# Patient Record
Sex: MALE | Race: BLACK OR AFRICAN AMERICAN | Employment: UNEMPLOYED | URBAN - METROPOLITAN AREA
[De-identification: names, ages, dates, MRNs, and addresses within clinical notes are randomized per-mention and may not be internally consistent; named-entity substitution may affect disease eponyms.]

---

## 2019-08-16 ENCOUNTER — HOSPITAL ENCOUNTER (EMERGENCY)
Age: 24
Discharge: HOME | End: 2019-08-16
Attending: EMERGENCY MEDICINE
Payer: COMMERCIAL

## 2019-08-16 ENCOUNTER — APPOINTMENT (OUTPATIENT)
Dept: GENERAL RADIOLOGY | Age: 24
End: 2019-08-16
Payer: COMMERCIAL

## 2019-08-16 VITALS
HEART RATE: 99 BPM | WEIGHT: 240 LBS | TEMPERATURE: 98 F | RESPIRATION RATE: 18 BRPM | DIASTOLIC BLOOD PRESSURE: 89 MMHG | HEIGHT: 72 IN | OXYGEN SATURATION: 98 % | BODY MASS INDEX: 32.51 KG/M2 | SYSTOLIC BLOOD PRESSURE: 150 MMHG

## 2019-08-16 DIAGNOSIS — S62.346A CLOSED NONDISPLACED FRACTURE OF BASE OF FIFTH METACARPAL BONE OF RIGHT HAND, INITIAL ENCOUNTER: Primary | ICD-10-CM

## 2019-08-16 PROCEDURE — 99283 EMERGENCY DEPT VISIT LOW MDM: CPT

## 2019-08-16 PROCEDURE — 73130 X-RAY EXAM OF HAND: CPT

## 2019-08-16 PROCEDURE — 73110 X-RAY EXAM OF WRIST: CPT

## 2019-08-16 RX ORDER — NAPROXEN 500 MG/1
500 TABLET ORAL 2 TIMES DAILY
Qty: 20 TABLET | Refills: 0 | Status: ON HOLD | OUTPATIENT
Start: 2019-08-16 | End: 2019-12-30

## 2019-08-16 ASSESSMENT — PAIN DESCRIPTION - LOCATION: LOCATION: HAND

## 2019-08-16 ASSESSMENT — PAIN DESCRIPTION - DESCRIPTORS: DESCRIPTORS: ACHING;SHARP;STABBING

## 2019-08-16 ASSESSMENT — PAIN DESCRIPTION - PAIN TYPE: TYPE: ACUTE PAIN

## 2019-08-16 ASSESSMENT — PAIN DESCRIPTION - ORIENTATION: ORIENTATION: RIGHT

## 2019-08-16 ASSESSMENT — PAIN SCALES - GENERAL: PAINLEVEL_OUTOF10: 10

## 2019-08-16 ASSESSMENT — ENCOUNTER SYMPTOMS: COLOR CHANGE: 0

## 2019-08-20 DIAGNOSIS — M79.641 RIGHT HAND PAIN: Primary | ICD-10-CM

## 2019-08-21 ENCOUNTER — OFFICE VISIT (OUTPATIENT)
Dept: ORTHOPEDIC SURGERY | Age: 24
End: 2019-08-21
Payer: COMMERCIAL

## 2019-08-21 VITALS — BODY MASS INDEX: 32.25 KG/M2 | WEIGHT: 238.1 LBS | HEIGHT: 72 IN

## 2019-08-21 DIAGNOSIS — S62.346A CLOSED NONDISPLACED FRACTURE OF BASE OF FIFTH METACARPAL BONE OF RIGHT HAND, INITIAL ENCOUNTER: Primary | ICD-10-CM

## 2019-08-21 PROCEDURE — 1036F TOBACCO NON-USER: CPT | Performed by: ORTHOPAEDIC SURGERY

## 2019-08-21 PROCEDURE — G8417 CALC BMI ABV UP PARAM F/U: HCPCS | Performed by: ORTHOPAEDIC SURGERY

## 2019-08-21 PROCEDURE — 99213 OFFICE O/P EST LOW 20 MIN: CPT | Performed by: ORTHOPAEDIC SURGERY

## 2019-08-21 PROCEDURE — G8427 DOCREV CUR MEDS BY ELIG CLIN: HCPCS | Performed by: ORTHOPAEDIC SURGERY

## 2019-09-04 ENCOUNTER — OFFICE VISIT (OUTPATIENT)
Dept: ORTHOPEDIC SURGERY | Age: 24
End: 2019-09-04
Payer: COMMERCIAL

## 2019-09-04 VITALS — WEIGHT: 238.1 LBS | BODY MASS INDEX: 32.25 KG/M2 | HEIGHT: 72 IN

## 2019-09-04 DIAGNOSIS — S62.346A CLOSED NONDISPLACED FRACTURE OF BASE OF FIFTH METACARPAL BONE OF RIGHT HAND, INITIAL ENCOUNTER: Primary | ICD-10-CM

## 2019-09-04 PROCEDURE — 1036F TOBACCO NON-USER: CPT | Performed by: STUDENT IN AN ORGANIZED HEALTH CARE EDUCATION/TRAINING PROGRAM

## 2019-09-04 PROCEDURE — G8417 CALC BMI ABV UP PARAM F/U: HCPCS | Performed by: STUDENT IN AN ORGANIZED HEALTH CARE EDUCATION/TRAINING PROGRAM

## 2019-09-04 PROCEDURE — G8427 DOCREV CUR MEDS BY ELIG CLIN: HCPCS | Performed by: STUDENT IN AN ORGANIZED HEALTH CARE EDUCATION/TRAINING PROGRAM

## 2019-09-04 PROCEDURE — 99212 OFFICE O/P EST SF 10 MIN: CPT | Performed by: STUDENT IN AN ORGANIZED HEALTH CARE EDUCATION/TRAINING PROGRAM

## 2019-09-05 NOTE — PROGRESS NOTES
name: Not on file    Number of children: Not on file    Years of education: Not on file    Highest education level: Not on file   Occupational History    Not on file   Social Needs    Financial resource strain: Not on file    Food insecurity:     Worry: Not on file     Inability: Not on file    Transportation needs:     Medical: Not on file     Non-medical: Not on file   Tobacco Use    Smoking status: Never Smoker    Smokeless tobacco: Never Used   Substance and Sexual Activity    Alcohol use: No    Drug use: No    Sexual activity: Not Currently   Lifestyle    Physical activity:     Days per week: Not on file     Minutes per session: Not on file    Stress: Not on file   Relationships    Social connections:     Talks on phone: Not on file     Gets together: Not on file     Attends Restorationist service: Not on file     Active member of club or organization: Not on file     Attends meetings of clubs or organizations: Not on file     Relationship status: Not on file    Intimate partner violence:     Fear of current or ex partner: Not on file     Emotionally abused: Not on file     Physically abused: Not on file     Forced sexual activity: Not on file   Other Topics Concern    Not on file   Social History Narrative    Not on file       Family History:  Family History   Problem Relation Age of Onset    High Blood Pressure Mother         pre-htn    High Blood Pressure Father         pre-htn       REVIEW OF SYSTEMS:  Constitutional: Negative for fever and chills.      Musculoskeletal: Positive for tenderness right hand     PHYSICAL EXAM:  Ht 6' 0.01\" (1.829 m)   Wt 238 lb 1.6 oz (108 kg)   BMI 32.28 kg/m²   Gen: AAOx3, NAD, appears the stated age  CV: no dependent edema, distal pulses 2+  Chest: unlabored respirations, equal chest rise bilaterally, no audible wheezes    Ortho Exam  Extremity: Right hand  Appropriate tenderness to palpation appreciated at the base of the fifth metacarpal.  Hand cascade

## 2019-12-03 ENCOUNTER — TELEPHONE (OUTPATIENT)
Dept: FAMILY MEDICINE CLINIC | Age: 24
End: 2019-12-03

## 2019-12-30 ENCOUNTER — APPOINTMENT (OUTPATIENT)
Dept: CT IMAGING | Age: 24
DRG: 753 | End: 2019-12-30
Payer: COMMERCIAL

## 2019-12-30 ENCOUNTER — HOSPITAL ENCOUNTER (INPATIENT)
Age: 24
LOS: 8 days | Discharge: HOME | DRG: 753 | End: 2020-01-07
Attending: EMERGENCY MEDICINE | Admitting: PSYCHIATRY & NEUROLOGY
Payer: COMMERCIAL

## 2019-12-30 PROBLEM — F31.9 BIPOLAR 1 DISORDER (HCC): Status: ACTIVE | Noted: 2019-12-30

## 2019-12-30 LAB
ABSOLUTE EOS #: 0 K/UL (ref 0–0.4)
ABSOLUTE IMMATURE GRANULOCYTE: ABNORMAL K/UL (ref 0–0.3)
ABSOLUTE LYMPH #: 1.5 K/UL (ref 1–4.8)
ABSOLUTE MONO #: 0.7 K/UL (ref 0.1–1.3)
ANION GAP SERPL CALCULATED.3IONS-SCNC: 13 MMOL/L (ref 9–17)
BASOPHILS # BLD: 0 % (ref 0–2)
BASOPHILS ABSOLUTE: 0 K/UL (ref 0–0.2)
BUN BLDV-MCNC: 11 MG/DL (ref 6–20)
BUN/CREAT BLD: ABNORMAL (ref 9–20)
CALCIUM SERPL-MCNC: 9.8 MG/DL (ref 8.6–10.4)
CHLORIDE BLD-SCNC: 101 MMOL/L (ref 98–107)
CO2: 22 MMOL/L (ref 20–31)
CREAT SERPL-MCNC: 0.81 MG/DL (ref 0.7–1.2)
DIFFERENTIAL TYPE: ABNORMAL
EOSINOPHILS RELATIVE PERCENT: 0 % (ref 0–4)
ETHANOL PERCENT: <0.01 %
ETHANOL: <10 MG/DL
GFR AFRICAN AMERICAN: >60 ML/MIN
GFR NON-AFRICAN AMERICAN: >60 ML/MIN
GFR SERPL CREATININE-BSD FRML MDRD: ABNORMAL ML/MIN/{1.73_M2}
GFR SERPL CREATININE-BSD FRML MDRD: ABNORMAL ML/MIN/{1.73_M2}
GLUCOSE BLD-MCNC: 154 MG/DL (ref 70–99)
HCT VFR BLD CALC: 48.9 % (ref 41–53)
HEMOGLOBIN: 16.6 G/DL (ref 13.5–17.5)
IMMATURE GRANULOCYTES: ABNORMAL %
LYMPHOCYTES # BLD: 16 % (ref 24–44)
MAGNESIUM: 2.1 MG/DL (ref 1.6–2.6)
MCH RBC QN AUTO: 30.6 PG (ref 26–34)
MCHC RBC AUTO-ENTMCNC: 33.9 G/DL (ref 31–37)
MCV RBC AUTO: 90.4 FL (ref 80–100)
MONOCYTES # BLD: 7 % (ref 1–7)
NRBC AUTOMATED: ABNORMAL PER 100 WBC
PDW BLD-RTO: 13.6 % (ref 11.5–14.9)
PLATELET # BLD: 256 K/UL (ref 150–450)
PLATELET ESTIMATE: ABNORMAL
PMV BLD AUTO: 7.8 FL (ref 6–12)
POTASSIUM SERPL-SCNC: 3.9 MMOL/L (ref 3.7–5.3)
RBC # BLD: 5.4 M/UL (ref 4.5–5.9)
RBC # BLD: ABNORMAL 10*6/UL
SEG NEUTROPHILS: 77 % (ref 36–66)
SEGMENTED NEUTROPHILS ABSOLUTE COUNT: 7.1 K/UL (ref 1.3–9.1)
SODIUM BLD-SCNC: 136 MMOL/L (ref 135–144)
TSH SERPL DL<=0.05 MIU/L-ACNC: 1.82 MIU/L (ref 0.3–5)
WBC # BLD: 9.3 K/UL (ref 3.5–11)
WBC # BLD: ABNORMAL 10*3/UL

## 2019-12-30 PROCEDURE — 70450 CT HEAD/BRAIN W/O DYE: CPT

## 2019-12-30 PROCEDURE — 1240000000 HC EMOTIONAL WELLNESS R&B

## 2019-12-30 PROCEDURE — 83735 ASSAY OF MAGNESIUM: CPT

## 2019-12-30 PROCEDURE — 85025 COMPLETE CBC W/AUTO DIFF WBC: CPT

## 2019-12-30 PROCEDURE — 84443 ASSAY THYROID STIM HORMONE: CPT

## 2019-12-30 PROCEDURE — 99285 EMERGENCY DEPT VISIT HI MDM: CPT

## 2019-12-30 PROCEDURE — G0480 DRUG TEST DEF 1-7 CLASSES: HCPCS

## 2019-12-30 PROCEDURE — 80048 BASIC METABOLIC PNL TOTAL CA: CPT

## 2019-12-30 PROCEDURE — 6370000000 HC RX 637 (ALT 250 FOR IP): Performed by: NURSE PRACTITIONER

## 2019-12-30 PROCEDURE — 90792 PSYCH DIAG EVAL W/MED SRVCS: CPT | Performed by: NURSE PRACTITIONER

## 2019-12-30 PROCEDURE — 36415 COLL VENOUS BLD VENIPUNCTURE: CPT

## 2019-12-30 RX ORDER — ACETAMINOPHEN 325 MG/1
650 TABLET ORAL EVERY 4 HOURS PRN
Status: DISCONTINUED | OUTPATIENT
Start: 2019-12-30 | End: 2020-01-07 | Stop reason: HOSPADM

## 2019-12-30 RX ORDER — BENZTROPINE MESYLATE 1 MG/ML
2 INJECTION INTRAMUSCULAR; INTRAVENOUS 2 TIMES DAILY PRN
Status: DISCONTINUED | OUTPATIENT
Start: 2019-12-30 | End: 2020-01-07 | Stop reason: HOSPADM

## 2019-12-30 RX ORDER — HALOPERIDOL 10 MG/1
5 TABLET ORAL EVERY 6 HOURS PRN
Status: DISCONTINUED | OUTPATIENT
Start: 2019-12-30 | End: 2019-12-30

## 2019-12-30 RX ORDER — HALOPERIDOL 5 MG/ML
5 INJECTION INTRAMUSCULAR EVERY 6 HOURS PRN
Status: DISCONTINUED | OUTPATIENT
Start: 2019-12-30 | End: 2020-01-07 | Stop reason: HOSPADM

## 2019-12-30 RX ORDER — LORAZEPAM 1 MG/1
1 TABLET ORAL EVERY 6 HOURS PRN
Status: DISCONTINUED | OUTPATIENT
Start: 2019-12-30 | End: 2019-12-30

## 2019-12-30 RX ORDER — MAGNESIUM HYDROXIDE/ALUMINUM HYDROXICE/SIMETHICONE 120; 1200; 1200 MG/30ML; MG/30ML; MG/30ML
30 SUSPENSION ORAL EVERY 6 HOURS PRN
Status: DISCONTINUED | OUTPATIENT
Start: 2019-12-30 | End: 2020-01-07 | Stop reason: HOSPADM

## 2019-12-30 RX ORDER — LORAZEPAM 2 MG/ML
1 INJECTION INTRAMUSCULAR EVERY 6 HOURS PRN
Status: DISCONTINUED | OUTPATIENT
Start: 2019-12-30 | End: 2020-01-07 | Stop reason: HOSPADM

## 2019-12-30 RX ORDER — TRAZODONE HYDROCHLORIDE 50 MG/1
50 TABLET ORAL NIGHTLY PRN
Status: DISCONTINUED | OUTPATIENT
Start: 2019-12-30 | End: 2020-01-07 | Stop reason: HOSPADM

## 2019-12-30 RX ORDER — SERTRALINE HYDROCHLORIDE 100 MG/1
100 TABLET, FILM COATED ORAL DAILY
Status: ON HOLD | COMMUNITY
End: 2020-01-07 | Stop reason: HOSPADM

## 2019-12-30 RX ADMIN — TRAZODONE HYDROCHLORIDE 50 MG: 50 TABLET ORAL at 23:37

## 2019-12-30 ASSESSMENT — LIFESTYLE VARIABLES: HISTORY_ALCOHOL_USE: NO

## 2019-12-30 ASSESSMENT — SLEEP AND FATIGUE QUESTIONNAIRES
RESTFUL SLEEP: NO
DIFFICULTY FALLING ASLEEP: YES
AVERAGE NUMBER OF SLEEP HOURS: 5
DO YOU USE A SLEEP AID: NO
DIFFICULTY STAYING ASLEEP: YES
SLEEP PATTERN: DISTURBED/INTERRUPTED SLEEP;RESTLESSNESS
DIFFICULTY ARISING: NO
DO YOU HAVE DIFFICULTY SLEEPING: YES

## 2019-12-30 ASSESSMENT — ENCOUNTER SYMPTOMS
BACK PAIN: 0
ABDOMINAL PAIN: 0
DIARRHEA: 0
VOMITING: 0
SHORTNESS OF BREATH: 0
COUGH: 0

## 2019-12-30 ASSESSMENT — PAIN DESCRIPTION - LOCATION: LOCATION: HEAD

## 2019-12-30 ASSESSMENT — PAIN - FUNCTIONAL ASSESSMENT: PAIN_FUNCTIONAL_ASSESSMENT: 0-10

## 2019-12-30 ASSESSMENT — PAIN SCALES - GENERAL
PAINLEVEL_OUTOF10: 7
PAINLEVEL_OUTOF10: 0

## 2019-12-30 ASSESSMENT — PAIN DESCRIPTION - DESCRIPTORS: DESCRIPTORS: HEADACHE

## 2019-12-30 ASSESSMENT — PAIN DESCRIPTION - PAIN TYPE: TYPE: ACUTE PAIN

## 2019-12-30 NOTE — VIRTUAL HEALTH
linear, goal directed and coherent  Thought content:  Homocidal ideation denies  Suicidal Ideation:  Patient currently denies thoughts of wanting to die but did request a gun and a knife to inflict some sort of pain on himself earlier today  Delusions:  Patient believes that he is dead and that his family is not his real family; additionally the patient is reported to have amde statements that the family pet was channeling spiritual energy through the family members  Perceptual Disturbance:  Unable to assess  Cognition:  oriented to person, place, and time  Concentration distractible  Memory: intact  Insight & Judgement: limited   Medication side effects: NA     DSM V DIAGNOSIS:  Bipolar mood disorder, current episode manic    General Medical Condition:      There is no problem list on file for this patient. PLAN:    1. Please contact the on-call provider for consideration for admission to the [facility]. When discussing discharge disposition the patient did state that he accepted this writers \"recommendation and guidance\" and would be willing to sign in on a voluntary basis for psychiatric treatment. If patient is unwilling to be admitted on a voluntary basis, a pink slip will be necessary as the patient's family has reasonable concerns about his safety and patient has impaired judgement/insight and safety awareness. Thank you very much for allowing us to participate in the care of this patient. Time spent > 60 min.  Provider's Signature:  Electronically signed by Gerson Velez, MSN, [address] on 12/30/2019 at 5:24 PM.

## 2019-12-30 NOTE — ED NOTES
Patient refusing lab work to be drawn and states that he just wants to be able to go home and get some sleep. He states that he no longer has a headache and doesn't think he needs to have any labs drawn. Dr. Shanti Morgan notified.            David Steele RN  12/30/19 8936

## 2019-12-30 NOTE — ED PROVIDER NOTES
respiratory distress. Breath sounds: Normal breath sounds. Abdominal:      Palpations: Abdomen is soft. Tenderness: There is no tenderness. Skin:     General: Skin is warm and dry. Capillary Refill: Capillary refill takes less than 2 seconds. Neurological:      Mental Status: He is alert. Comments: 5 out of 5 muscle strength in all extremities intact finger-nose no pronator drift negative Romberg able to ambulate without difficulty       DIAGNOSTIC RESULTS   RADIOLOGY:All plain film, CT, MRI, and formal ultrasound images (except ED bedside ultrasound) are read by the radiologist, see reports below, unless otherwisenoted in MDM or here. CT Head WO Contrast   Final Result   No acute intracranial abnormality. LABS: All lab results were reviewed by myself, and all abnormals are listed below. Labs Reviewed   CBC WITH AUTO DIFFERENTIAL - Abnormal; Notable for the following components:       Result Value    Seg Neutrophils 77 (*)     Lymphocytes 16 (*)     All other components within normal limits   BASIC METABOLIC PANEL - Abnormal; Notable for the following components:    Glucose 154 (*)     All other components within normal limits   MAGNESIUM   TSH WITH REFLEX   ETHANOL   URINE DRUG SCREEN       EMERGENCY DEPARTMENTCOURSE:   Differential diagnosis includes intracranial bleed, mass, polysubstance abuse, electrolyte abnormality, exacerbation of chronic mental health disorder. The patient has been to [address] in the past this is not a new onset psychosis. However given his posterior headache will obtain a CT head as well as labs to rule out a medical cause. 4:47 PM  The patient states that his headache feels much better his CT scan is negative for intracranial bleed or mass.   The patient states that he does not want an IV or labs at this time he appears appropriate to me however social work is concerned based on what is the family is saying so will perform a tele-psych. 5:32 PM  Social work, tele-psych would like the patient to be pink slipped because of this will have to complete the medical work-up will obtain lab evaluation. 6:30 PM  There is no elevation in creatinine no electrolyte abnormality no anion gap elevation ethanol is negative glucose is mildly elevated 154 but no anion gap do not believe he is in DKA TSH is normal there is no leukocytosis no anemia no thrombocytopenia. The patient is currently medically cleared he will be admitted for further management of his mental health problem. Vitals:    Vitals:    12/30/19 1503   BP: (!) 158/104   Pulse: 72   Resp: 16   Temp: 98.7 °F (37.1 °C)   TempSrc: Oral   SpO2: 98%   Weight: 220 lb (99.8 kg)   Height: 6' (1.829 m)       FINAL IMPRESSION      1. Nonintractable headache, unspecified chronicity pattern, unspecified headache type    2.  Verbalizes suicidal thoughts         DISPOSITION/PLAN   DISPOSITION Decision To Admit 12/30/2019 06:31:40 PM  Koki Kauffman MD  Attending Emergency Physician    This charting supersedes any ED resident or staff charting and was written using speech recognition software        Koki Kauffman MD  12/30/19 8745

## 2019-12-31 LAB
ALBUMIN SERPL-MCNC: 4 G/DL (ref 3.5–5.2)
ALBUMIN/GLOBULIN RATIO: NORMAL (ref 1–2.5)
ALP BLD-CCNC: 75 U/L (ref 40–129)
ALT SERPL-CCNC: 33 U/L (ref 5–41)
ANION GAP SERPL CALCULATED.3IONS-SCNC: 10 MMOL/L (ref 9–17)
AST SERPL-CCNC: 23 U/L
BILIRUB SERPL-MCNC: 0.45 MG/DL (ref 0.3–1.2)
BUN BLDV-MCNC: 10 MG/DL (ref 6–20)
BUN/CREAT BLD: NORMAL (ref 9–20)
CALCIUM SERPL-MCNC: 9.3 MG/DL (ref 8.6–10.4)
CHLORIDE BLD-SCNC: 101 MMOL/L (ref 98–107)
CHOLESTEROL/HDL RATIO: 3.3
CHOLESTEROL: 145 MG/DL
CO2: 26 MMOL/L (ref 20–31)
CREAT SERPL-MCNC: 0.97 MG/DL (ref 0.7–1.2)
ESTIMATED AVERAGE GLUCOSE: 105 MG/DL
GFR AFRICAN AMERICAN: >60 ML/MIN
GFR NON-AFRICAN AMERICAN: >60 ML/MIN
GFR SERPL CREATININE-BSD FRML MDRD: NORMAL ML/MIN/{1.73_M2}
GFR SERPL CREATININE-BSD FRML MDRD: NORMAL ML/MIN/{1.73_M2}
GLUCOSE BLD-MCNC: 90 MG/DL (ref 70–99)
HBA1C MFR BLD: 5.3 % (ref 4–6)
HDLC SERPL-MCNC: 44 MG/DL
LDL CHOLESTEROL: 94 MG/DL (ref 0–130)
POTASSIUM SERPL-SCNC: 4.4 MMOL/L (ref 3.7–5.3)
SODIUM BLD-SCNC: 137 MMOL/L (ref 135–144)
TOTAL PROTEIN: 7.1 G/DL (ref 6.4–8.3)
TRIGL SERPL-MCNC: 36 MG/DL
VLDLC SERPL CALC-MCNC: NORMAL MG/DL (ref 1–30)

## 2019-12-31 PROCEDURE — 1240000000 HC EMOTIONAL WELLNESS R&B

## 2019-12-31 PROCEDURE — 80061 LIPID PANEL: CPT

## 2019-12-31 PROCEDURE — 36415 COLL VENOUS BLD VENIPUNCTURE: CPT

## 2019-12-31 PROCEDURE — 83036 HEMOGLOBIN GLYCOSYLATED A1C: CPT

## 2019-12-31 PROCEDURE — 90792 PSYCH DIAG EVAL W/MED SRVCS: CPT | Performed by: NURSE PRACTITIONER

## 2019-12-31 PROCEDURE — 80053 COMPREHEN METABOLIC PANEL: CPT

## 2019-12-31 PROCEDURE — 6370000000 HC RX 637 (ALT 250 FOR IP): Performed by: NURSE PRACTITIONER

## 2019-12-31 RX ORDER — ARIPIPRAZOLE 5 MG/1
5 TABLET ORAL DAILY
Status: DISCONTINUED | OUTPATIENT
Start: 2019-12-31 | End: 2020-01-02

## 2019-12-31 RX ADMIN — ACETAMINOPHEN 650 MG: 325 TABLET, FILM COATED ORAL at 10:37

## 2019-12-31 ASSESSMENT — SLEEP AND FATIGUE QUESTIONNAIRES
RESTFUL SLEEP: NO
SLEEP PATTERN: DISTURBED/INTERRUPTED SLEEP
DIFFICULTY ARISING: NO
DO YOU HAVE DIFFICULTY SLEEPING: YES
DO YOU USE A SLEEP AID: NO
DIFFICULTY FALLING ASLEEP: YES
AVERAGE NUMBER OF SLEEP HOURS: 5
DIFFICULTY STAYING ASLEEP: YES

## 2019-12-31 ASSESSMENT — LIFESTYLE VARIABLES: HISTORY_ALCOHOL_USE: NO

## 2019-12-31 ASSESSMENT — PAIN SCALES - GENERAL
PAINLEVEL_OUTOF10: 1
PAINLEVEL_OUTOF10: 3

## 2019-12-31 NOTE — BH NOTE
30 days    Metabolic Screening:    No results found for: LABA1C    No results found for: CHOL  No results found for: TRIG  No results found for: HDL  No components found for: LDLCAL  No results found for: LABVLDL      Body mass index is 27.12 kg/m². BP Readings from Last 2 Encounters:   12/30/19 (!) 140/89   08/16/19 (!) 150/89           Pt admitted with followings belongings:  Dentures: None  Vision - Corrective Lenses: Glasses  Hearing Aid: None  Jewelry: None  Body Piercings Removed: N/A  Clothing: Footwear, Pants, Shirt, Socks  Were All Patient Medications Collected?: Not Applicable  Other Valuables: [address] [facility] sent home with NA. Valuables placed in safe in security envelope, number:  \[id number]. Patient's home medications were verified. Patient oriented to surroundings and program expectations and copy of patient rights given. Received admission packet:  yes. Consents reviewed, signed most of consents. Refused some admission paperwork. Patient verbalize understanding:  yes. Patient education on precautions: yes    PT admitted to unit and wanded for safety. PT Stopped taking his medications 5 days ago states \"because I felt bad for my girlfriend. \" PT does not give any other explanation on why he stopped taking his zoloft. PT has never been linked to a [facility] and his PCP prescribed his zoloft. PT reports poor sleep for the last week. PT denies any suicidal thoughts or hallucinations on admission. PT states he just needs help but is unable to say what he needs help with. PT then states \" I need to help others how do I do that\". PT appears preoccupied with own thoughts. PT denies any issues with drugs or alcohol. PT lives at home with his parents. Family brought in due to increase In bizarre behaviors and statements at home such as thinking the family cat is passing energy to the family.                     Mustapha Yung RN

## 2019-12-31 NOTE — BH NOTE
discharge    Clinical Summary:  Writer meets with client and completed assessment. Mr. Michelle Noonan is a 29-year old male who voluntarily presents to [facility] with family with a concern for client presenting with bizarre behaviors since Niyah day. Client was [facility] and then released to go home with family. Family reports when client can home the bizarre behaviors continued and client was asking if they had a knife or gun in the house. It was reported by family client stated asked for the knife or gun to \"feel something to see if it is real.\"  Client as well mentioned during the assessment that he wanted to use a knife to just feel something.   It was reported by family some of the bizarre behaviors present AEB lack of reality-based thinking, odd Anabaptism preoccupations, somatic symptoms, hypersexuality, oneyda to depressive state. Client denies any current suicidal or homicidal ideations. Client denies any hallucinations or delusions. Client reports spent 2-years in [facility] for indecent exposure but nothing current. Client reports stopped taking medications 5-days ago. During assessment the client continues to make sexual comments, such as I am a man, right? I do have a estuardo and it gets hard and works.   Client also begins to touch his breasts and states these are right for me to have as a man, right?   Client reports has not been able to sleep since Christmas. Client confirms no history of inpatient psychiatric hospitalizations; denies any prior suicide attempts; treated last at [facility] but was dismissed from the clinic due to sexually inappropriate behavior (reported \"getting erect in the officeit's just something that I do.\"). Client reports prior diagnosis of bipolar, oneyda. The client was born and raised [place] The client lives in [place], [place] with his parents. The client reports that he is the oldest of two children.   The client reports that he did finish 12th grade, not currently working, but is seeking employment. The client does have a girlfriend, never  and no children. The client identifies as Vianey Kulkarni and denies  experience. The client recently stopped smoking cigarettes and marijuana within the past week. The client estimates that he had been smoking 2 packs daily and has been doing this about a month. The client reported that he was using marijuana daily, estimating 2-3 blunts daily. He reports that it was about a gram a day and cost him $15-20 daily. The client recently stopped drinking alcohol as well. The client reported that he had been drinking \"a whole bottle of [place] iced tea. \"  The client states that he would only drink about every three days.

## 2019-12-31 NOTE — GROUP NOTE
Group Therapy Note    Date: 12/31/2019    Group Start Time: 1430  Group End Time: 4851  Group Topic: Psychoeducation    [facility]    Cassandra Scott     Patient's Goal:  To increase interpersonal interaction     Notes:      Status After Intervention:  Improved    Participation Level:  Active Listener and Interactive    Participation Quality: Appropriate, Attentive and Sharing      Speech:  normal      Thought Process/Content: Logical  Linear      Affective Functioning: Congruent      Mood: euthymic      Level of consciousness:  Alert, Oriented x4 and Attentive      Response to Learning: Able to verbalize current knowledge/experience, Able to verbalize/acknowledge new learning, Able to retain information and Progressing to goal      Endings: None Reported    Modes of Intervention: Education, Support, Socialization, Exploration, Clarifying, Problem-solving and Activity      Discipline Responsible: Psychoeducational Specialist      Signature:  Cassandra Scott

## 2019-12-31 NOTE — GROUP NOTE
Group Therapy Note    Date: 12/31/2019    Group Start Time: 1330  Group End Time: 5532  Group Topic: Recovery    [facility]    MABLE Bauman, AMY        Group Therapy Note    Attendees: 11/19    Patient's Goal:  Increase understanding of addiction and recovery process. Notes:  Pt is making progress AEB participating in group discussion about coping with anxiety and feelings of being overwhelmed. Status After Intervention:  Improved    Participation Level:  Active Listener and Interactive    Participation Quality: Appropriate, Attentive and Supportive      Speech:  normal      Thought Process/Content: Logical      Affective Functioning: Congruent      Mood: euthymic      Level of consciousness:  Alert, Oriented x4 and Attentive      Response to Learning: Progressing to goal      Endings: None Reported    Modes of Intervention: Education, Support, Socialization, Exploration, Clarifying and Problem-solving      Discipline Responsible: /Counselor      Signature:  MABLE Bauman LSW, [place]

## 2019-12-31 NOTE — PLAN OF CARE
Goals: 5-7 days    LONG-TERM GOALS:  Time frame for Long-Term Goals: 6 months  Members Present in Team Meeting: See Signature Sheet    KARELY Levin

## 2019-12-31 NOTE — H&P
HISTORY and Trealvina Keating 5747       NAME:  Michelle Noonan  MRN: [id number]   YOB: 1995   Date: 12/31/2019   Age: 30 y.o. Gender: male     COMPLAINT AND PRESENT HISTORY:      Michelle Noonan is 30 y.o., male, admitted for Bipolar I Disorder. Pt arrived to ED via private transportation with family accompanying. Per family, patient has been delusional, stating that he is dead and his family is not real. Family reports he is also experiencing mood swings and making suicidal statements. Patient was seen at [facility] for similar complaints. He admitted to wanting to inflict pain on himself to [facility] that something is real\". He was cooperative upon [facility] admit. Pt currently denies suicidal and homicidal ideations. Pt denies history of previous suicide attempts. No current auditory, visual or tactile hallucinations. Poor insight, elevated mood. Pt having delusions about his gender. He was very concerned that he may have \"female breasts\". He states he's confused as to whether he is [employer]" or not. Pt has poor sleep, stating he has not slept for 5 days. Sufficient appetite. Patient admits to previous alcohol and marijuana abuse, but states he quit all 2 weeks prior. Patient lives with his mother, father, and sister. Pt has been non compliant with his psychiatric medications. He previously was following up at [facility], but was dismissed from clinic for inappropriate sexual behaviors in office.     DIAGNOSTIC RESULTS   Labs:  CBC:   Recent Labs     12/30/19  1747   WBC 9.3   HGB 16.6        BMP:    Recent Labs     12/30/19  1747 12/31/19  0655    137   K 3.9 4.4    101   CO2 22 26   BUN 11 10   CREATININE 0.81 0.97   GLUCOSE 154* 90     Hepatic:   Recent Labs     12/31/19  0655   AST 23   ALT 33   BILITOT 0.45   ALKPHOS 75     Lipids:   Recent Labs     12/31/19  0655   CHOL 145   HDL 44     U/A:No results found for: NITRITE, COLORU, WBCUA, RBCUA, MUCUS, NECK:  No stiffness, trachea central.  No palpable masses or L.N.      CHEST:  Symmetrical and equal on expansion. HEART:  Regular rate and rhythm. S1 > S2, No audible murmurs or gallops. LUNGS:  Equal on expansion, normal breath sounds. No adventitious sounds. ABDOMEN:  Soft on palpation. No localized tenderness. No guarding or rigidity. No palpable organomegaly. LYMPHATICS:  No palpable cervical Lymphadenopathy. LOCOMOTOR, BACK AND SPINE:  No tenderness or deformities. EXTREMITIES:  Symmetrical, no pretibial edema. No discoloration or ulcerations. NEUROLOGIC:  The patient is conscious, alert, oriented,Cranial nerve II-XII intact, taste and smell were not examined. No apparent focal sensory or motor deficits. No facial droop, tongue protrudes centrally, no slurring of the speech. PROVISIONAL DIAGNOSES:      Principal Problem:    Bipolar 1 disorder ([facility] [address]  Resolved Problems:    * No resolved hospital problems.  *      DG Plata on 12/31/2019 at 2:30 PM

## 2019-12-31 NOTE — GROUP NOTE
Group Therapy Note    Date: 12/31/2019    Group Start Time: 1600  Group End Time: 5817  Group Topic: Healthy Living/Wellness    [facility]    Mili Fontana LPN        Group Therapy Note    Attendees: 15/19         Patient's Goal:  Participation in group    Notes:  Patient participated in group by listening and sharing    Status After Intervention:  Unchanged    Participation Level:  Active Listener and Interactive    Participation Quality: Appropriate      Speech:  normal      Thought Process/Content: Logical      Affective Functioning: Congruent      Mood: anxious      Level of consciousness:  Alert and Oriented x4      Response to Learning: Able to retain information      Endings: None Reported    Modes of Intervention: Support      Discipline Responsible: Licensed Practical Nurse      Signature:  Mili Fontana LPN

## 2019-12-31 NOTE — GROUP NOTE
Group Therapy Note    Date: 12/31/2019    Group Start Time: 1100  Group End Time: 0774  Group Topic: Cognitive Skills    ARINA BHLISA KhouryS        Group Therapy Note    Attendees: 11         Patient's Goal:  To improve coping skills     Notes:   Pt was pleasant and participated well     Status After Intervention:  Improved    Participation Level:  Active Listener    Participation Quality: Appropriate and Attentive      Speech:  normal      Thought Process/Content: Logical      Affective Functioning: Congruent      Mood: euthymic      Level of consciousness:  Alert and Oriented x4      Response to Learning: Able to verbalize current knowledge/experience and Progressing to goal      Endings: None Reported    Modes of Intervention: Education, Support and Problem-solving      Discipline Responsible: Psychoeducational Specialist      Signature:  Randy Valentin

## 2019-12-31 NOTE — BH NOTE
Psychiatric Admission Note    Wanda Nunez is a 30 y.o. male who was admitted from the [facility] on a voluntary basis. Prior to coming to the [facility], the patient had reportedly not slept in 5 days. He was evaluated at [facility] earlier on 12/30/2019 though declined for admission and instructed to return home. He was accompanied by his family to the ED yesterday. The patient's family reported that the patient has been increasingly bizarre since 12/25/2019. Yesterday the patient asked his family if they had guns or knives in the house because the patient wanted to \"feel something to see if it is real.\"  Patient has been stating to family that he is not in a \"real reality\" and that his family is not actually his family. The patient stated that he wanted to end his life to \"get back to another reality. \"  He has been making statements to family that he is dead and does not have a pulse. Patient has been having odd Islam preoccupations. He believed that a family pet was passing spiritual energy to the family. His mood is described to be labile, having swings in which he will go from crying to laughing to shouting to apologetic. The patient reportedly stopped taking medications 5 days ago. When seen via telehealth assessment the patient presented with elevated mood, almost ephoric. He had not been sleeping. He reported that he did ask his family for a knife or a gun because he wanted to inflict pain to himself to [facility] that something is real.\"      Today the patient seen in the assessment room. He is calm cooperative and pleasant. Remains elevated in mood. Continues to have limited insight. When asked how he was doing he states \"I actually slept last night. \"  He expresses that he does feel rested. Patient denies any auditory and visual hallucinations today.   Patient reports symptoms of oneyda including racing thoughts, increased impulsivity with poor decision making, lack of need for sleep and increased anxiety. Today he denies any suicidal and homicidal ideation. Patient expresses his appetite is \"good. \"  Denies feeling helpless hopeless or worthless. No overt delusions are noted today. Past Psychiatric History  The patient reports no past inpatient psychiatric hospitalizations. The patient denies suicide attempt. The patient is not currently linked with any [facility]. In the past the patient was also treated at [facility] but was dismissed from the clinic due to sexually inappropriate behavior (reported \"getting erect in the office. Caro Hazard it's just something that I do. \") The patient denies homicide attempt. Patient has a history of bipolar, oneyda. History of Substance Abuse     Patient reports that he stopped using alcohol and marijuana as well as cigarettes 6 days ago. Family History of psychiatric disorders    Family history: denied . Past psychiatric medications:  Reports zoloft; unable to identify any additional past medications. Medical History   Allergies:  Patient has no known allergies. Past Medical History:   Diagnosis Date    Severe manic bipolar I disorder w/psychotic features, mood-congruent [facility])       Past Surgical History:   Procedure Laterality Date    FRACTURE SURGERY      right hand 2016     Neurologic Exam    See H&P for further physical examination. SOCIAL HISTORY     The patient was born and raised in [place] The patient still lives in [place], [place] with his parents. The patient reports that he is the oldest of two children. The patient reports that he did finish 12th grade. The patient is not currently working, but is seeking employment. The patient does have a girlfriend. He has never been . The patient does not have children. The patient identifies as [place]. The patient denies  experience. The patient recently stopped smoking cigarettes and marijuana within the past week.   The patient estimates that he had been smoking 2 packs daily and has been doing this about a month. The patient reported that he was using marijuana daily, estimating 2-3 blunts daily. He reports that it was about a gram a day and cost him $15-20 daily. The patient recently stopped drinking alcohol as well. The patient reported that he had been drinking \"a whole bottle of of [place] iced tea. \"  The patient states that he would only drink about every three days. Social History     Socioeconomic History    Marital status: Single     Spouse name: Not on file    Number of children: Not on file    Years of education: Not on file    Highest education level: Not on file   Occupational History    Not on file   Social Needs    Financial resource strain: Not on file    Food insecurity:     Worry: Not on file     Inability: Not on file    Transportation needs:     Medical: Not on file     Non-medical: Not on file   Tobacco Use    Smoking status: Former Smoker     Types: Cigarettes     Last attempt to quit: 2019     Years since quittin.0    Smokeless tobacco: Never Used   Substance and Sexual Activity    Alcohol use: Yes     Comment: patient reports drinking heavily but states he quit drinking 2 weeks ago -(19).     Drug use: Not Currently     Types: Marijuana     Comment: Quit smoking Marijuana two weeks ago - 19    Sexual activity: Not Currently   Lifestyle    Physical activity:     Days per week: Not on file     Minutes per session: Not on file    Stress: Not on file   Relationships    Social connections:     Talks on phone: Not on file     Gets together: Not on file     Attends Episcopal service: Not on file     Active member of club or organization: Not on file     Attends meetings of clubs or organizations: Not on file     Relationship status: Not on file    Intimate partner violence:     Fear of current or ex partner: Not on file     Emotionally abused: Not on file     Physically abused: Not on file     Forced sexual activity: Not on file   Other Topics Concern    Not on file   Social History Narrative    Not on file     Mental Status  Pt. was alert, fully oriented, and cooperative. Appearance and hygiene wereappropriate, well-groomed . Mood was elevated. Affect was labile, inappropriately animated Thought process was organized though presents with flight of ideas and racing thoughts. Patient denied any hallucinations or paranoia. Patient denied suicidal ideations. Patient denied homicidal ideations . Patient's gross cognitive functions were intact. Insight and judgement were fair. Both recent and remote memory were intact.     Psychomotor status was restless     Labs  Recent Results (from the past 72 hour(s))   CBC Auto Differential    Collection Time: 12/30/19  5:47 PM   Result Value Ref Range    WBC 9.3 3.5 - 11.0 k/uL    RBC 5.40 4.5 - 5.9 m/uL    Hemoglobin 16.6 13.5 - 17.5 g/dL    Hematocrit 48.9 41 - 53 %    MCV 90.4 80 - 100 fL    MCH 30.6 26 - 34 pg    MCHC 33.9 31 - 37 g/dL    RDW 13.6 11.5 - 14.9 %    Platelets 222 342 - 457 k/uL    MPV 7.8 6.0 - 12.0 fL    NRBC Automated NOT REPORTED per 100 WBC    Differential Type NOT REPORTED     Seg Neutrophils 77 (H) 36 - 66 %    Lymphocytes 16 (L) 24 - 44 %    Monocytes 7 1 - 7 %    Eosinophils % 0 0 - 4 %    Basophils 0 0 - 2 %    Immature Granulocytes NOT REPORTED 0 %    Segs Absolute 7.10 1.3 - 9.1 k/uL    Absolute Lymph # 1.50 1.0 - 4.8 k/uL    Absolute Mono # 0.70 0.1 - 1.3 k/uL    Absolute Eos # 0.00 0.0 - 0.4 k/uL    Basophils Absolute 0.00 0.0 - 0.2 k/uL    Absolute Immature Granulocyte NOT REPORTED 0.00 - 0.30 k/uL    WBC Morphology NOT REPORTED     RBC Morphology NOT REPORTED     Platelet Estimate NOT REPORTED    Basic Metabolic Panel    Collection Time: 12/30/19  5:47 PM   Result Value Ref Range    Glucose 154 (H) 70 - 99 mg/dL    BUN 11 6 - 20 mg/dL    CREATININE 0.81 0.70 - 1.20 mg/dL    Bun/Cre Ratio NOT REPORTED 9 - 20    Calcium 9.8 8.6 - 10.4 mg/dL Sodium 136 135 - 144 mmol/L    Potassium 3.9 3.7 - 5.3 mmol/L    Chloride 101 98 - 107 mmol/L    CO2 22 20 - 31 mmol/L    Anion Gap 13 9 - 17 mmol/L    GFR Non-African American >60 >60 mL/min    GFR African American >60 >60 mL/min    GFR Comment          GFR Staging NOT REPORTED    Magnesium    Collection Time: 12/30/19  5:47 PM   Result Value Ref Range    Magnesium 2.1 1.6 - 2.6 mg/dL   TSH with Reflex    Collection Time: 12/30/19  5:47 PM   Result Value Ref Range    TSH 1.82 0.30 - 5.00 mIU/L   Ethanol    Collection Time: 12/30/19  5:47 PM   Result Value Ref Range    Ethanol <10 <10 mg/dL    Ethanol percent <0.010 %   Comprehensive Metabolic Panel w/ Reflex to MG    Collection Time: 12/31/19  6:55 AM   Result Value Ref Range    Glucose 90 70 - 99 mg/dL    BUN 10 6 - 20 mg/dL    CREATININE 0.97 0.70 - 1.20 mg/dL    Bun/Cre Ratio NOT REPORTED 9 - 20    Calcium 9.3 8.6 - 10.4 mg/dL    Sodium 137 135 - 144 mmol/L    Potassium 4.4 3.7 - 5.3 mmol/L    Chloride 101 98 - 107 mmol/L    CO2 26 20 - 31 mmol/L    Anion Gap 10 9 - 17 mmol/L    Alkaline Phosphatase 75 40 - 129 U/L    ALT 33 5 - 41 U/L    AST 23 <40 U/L    Total Bilirubin 0.45 0.3 - 1.2 mg/dL    Total Protein 7.1 6.4 - 8.3 g/dL    Alb 4.0 3.5 - 5.2 g/dL    Albumin/Globulin Ratio NOT REPORTED 1.0 - 2.5    GFR Non-African American >60 >60 mL/min    GFR African American >60 >60 mL/min    GFR Comment          GFR Staging NOT REPORTED    Hemoglobin A1c    Collection Time: 12/31/19  6:55 AM   Result Value Ref Range    Hemoglobin A1C 5.3 4.0 - 6.0 %    Estimated Avg Glucose 105 mg/dL   Lipid panel - fasting    Collection Time: 12/31/19  6:55 AM   Result Value Ref Range    Cholesterol 145 <200 mg/dL    HDL 44 >40 mg/dL    LDL Cholesterol 94 0 - 130 mg/dL    Chol/HDL Ratio 3.3 <5    Triglycerides 36 <150 mg/dL    VLDL NOT REPORTED 1 - 30 mg/dL         Diagnostic Impression  Active Problems:    Bipolar 1 disorder (HCC)  Resolved Problems:    * No resolved hospital problems. *    Medications   ARIPiprazole  5 mg Oral Daily     acetaminophen, traZODone, benztropine mesylate, magnesium hydroxide, aluminum & magnesium hydroxide-simethicone, haloperidol lactate **AND** LORazepam    Treatment Plan:     Admit to inpatient psychiatric treatment   Supportive therapy with medication management. Reviewed risks and benefits as well as potential side effects with patient. Discontinue the patient's home sertraline. Start the patient on Abilify 5mg for bipolar disorder. He is agreeable to the idea of [address]. Will monitor for efficacy of medication prior to administering VINSON.  Therapeutic activities and groups   [place] [facility]    Estimated length of stay: 5-7 days    LAMONTE Mccracken - JOE  Psychiatric Advanced Practice Nurse  [employer] voice recognition software used in portions of this document.  Occasionally words are mis-transcribed

## 2019-12-31 NOTE — GROUP NOTE
Group Therapy Note    Date: 12/31/2019    Group Start Time: 0900  Group End Time: 0930  Group Topic: Community Meeting    [facility] D    Jimmie Rojas    Patient's Goal: To increase interpersonal interaction      Notes:      Status After Intervention:  Unchanged    Participation Level: Interactive    Participation Quality: Appropriate and Sharing      Speech:  normal      Thought Process/Content: Logical      Affective Functioning: Constricted/Restricted      Mood: Stable      Level of consciousness:  Alert, Oriented x4 and Preoccupied      Response to Learning: Progressing to goal      Endings: None Reported    Modes of Intervention: Education, Support, Socialization, Exploration, Clarifying and Problem-solving      Discipline Responsible: Psychoeducational Specialist      Signature:  Jimmie Rojas

## 2020-01-01 PROCEDURE — 1240000000 HC EMOTIONAL WELLNESS R&B

## 2020-01-01 PROCEDURE — 6370000000 HC RX 637 (ALT 250 FOR IP): Performed by: NURSE PRACTITIONER

## 2020-01-01 PROCEDURE — 99232 SBSQ HOSP IP/OBS MODERATE 35: CPT | Performed by: NURSE PRACTITIONER

## 2020-01-01 PROCEDURE — 90833 PSYTX W PT W E/M 30 MIN: CPT | Performed by: NURSE PRACTITIONER

## 2020-01-01 RX ADMIN — ARIPIPRAZOLE 5 MG: 5 TABLET ORAL at 09:19

## 2020-01-01 RX ADMIN — ACETAMINOPHEN 650 MG: 325 TABLET, FILM COATED ORAL at 19:20

## 2020-01-01 ASSESSMENT — PAIN DESCRIPTION - PAIN TYPE: TYPE: ACUTE PAIN

## 2020-01-01 ASSESSMENT — PAIN DESCRIPTION - DESCRIPTORS: DESCRIPTORS: HEADACHE

## 2020-01-01 ASSESSMENT — PAIN SCALES - GENERAL
PAINLEVEL_OUTOF10: 0
PAINLEVEL_OUTOF10: 3

## 2020-01-01 ASSESSMENT — PAIN DESCRIPTION - LOCATION: LOCATION: HEAD

## 2020-01-01 NOTE — PROGRESS NOTES
Department of Psychiatry  Attending Progress Note  Chief Complaint: Bipolar 1 disorder [facility])     SUBJECTIVE:  This is a 30year old male who is being seen for follow up today. The patient was admitted due to oneyda and distorted, grandiose thoughts. Charting and documentation have been reviewed. Nursing reports that the patient initially refused his Abilify this morning and wanted to talk with this writer. Patient seen up in the milieu today. He is calm cooperative and pleasant. He states he slept well last evening. This morning he states he was feeling \"really down and depressed,\" but now he is \"in a place. \"  Currently denies any suicidal or homicidal ideation. Does report a headache. No other physical symptoms reported. He states that he is not having any anxiety. Mood remains almost euphoric like. No psychosis noted or reported. Provided psychotherapy for 20 minutes that included supportive and empathetic listening, psychoeducation about bipolar disorder, and education about medications no additional needs expressed at conclusion of assessment. No acute distress or discomfort. OBJECTIVE    Physical  BP (!) 151/81   Pulse 88   Temp 98 °F (36.7 °C) (Oral)   Resp 14   Ht 6' (1.829 m)   Wt 200 lb (90.7 kg)   SpO2 98%   BMI 27.12 kg/m²      Mental Status Evaluation:  Orientation: alertness: alert   Mood:. elevated      Affect:  labile and mood-congruent      Appearance:  age appropriate and casually dressed   Activity:  Within Normal Limits   Gait/Posture: Normal   Speech:  normal pitch and normal volume   Thought Process:  within normal limits   Thought Content:  Denies SI and HI. No psychosis.    Sensorium:  person, place, time/date and situation   Cognition:  grossly intact   Memory: intact   Insight:  limited   Judgment: limited   Suicidal Ideations: denies suicidal ideation   Homicidal Ideations: Negative for homicidal ideation      Medication Side Effects: absent       Attention Span attention span and concentration were age appropriate       Labs  Recent Results (from the past 72 hour(s))   CBC Auto Differential    Collection Time: 12/30/19  5:47 PM   Result Value Ref Range    WBC 9.3 3.5 - 11.0 k/uL    RBC 5.40 4.5 - 5.9 m/uL    Hemoglobin 16.6 13.5 - 17.5 g/dL    Hematocrit 48.9 41 - 53 %    MCV 90.4 80 - 100 fL    MCH 30.6 26 - 34 pg    MCHC 33.9 31 - 37 g/dL    RDW 13.6 11.5 - 14.9 %    Platelets 533 136 - 041 k/uL    MPV 7.8 6.0 - 12.0 fL    NRBC Automated NOT REPORTED per 100 WBC    Differential Type NOT REPORTED     Seg Neutrophils 77 (H) 36 - 66 %    Lymphocytes 16 (L) 24 - 44 %    Monocytes 7 1 - 7 %    Eosinophils % 0 0 - 4 %    Basophils 0 0 - 2 %    Immature Granulocytes NOT REPORTED 0 %    Segs Absolute 7.10 1.3 - 9.1 k/uL    Absolute Lymph # 1.50 1.0 - 4.8 k/uL    Absolute Mono # 0.70 0.1 - 1.3 k/uL    Absolute Eos # 0.00 0.0 - 0.4 k/uL    Basophils Absolute 0.00 0.0 - 0.2 k/uL    Absolute Immature Granulocyte NOT REPORTED 0.00 - 0.30 k/uL    WBC Morphology NOT REPORTED     RBC Morphology NOT REPORTED     Platelet Estimate NOT REPORTED    Basic Metabolic Panel    Collection Time: 12/30/19  5:47 PM   Result Value Ref Range    Glucose 154 (H) 70 - 99 mg/dL    BUN 11 6 - 20 mg/dL    CREATININE 0.81 0.70 - 1.20 mg/dL    Bun/Cre Ratio NOT REPORTED 9 - 20    Calcium 9.8 8.6 - 10.4 mg/dL    Sodium 136 135 - 144 mmol/L    Potassium 3.9 3.7 - 5.3 mmol/L    Chloride 101 98 - 107 mmol/L    CO2 22 20 - 31 mmol/L    Anion Gap 13 9 - 17 mmol/L    GFR Non-African American >60 >60 mL/min    GFR African American >60 >60 mL/min    GFR Comment          GFR Staging NOT REPORTED    Magnesium    Collection Time: 12/30/19  5:47 PM   Result Value Ref Range    Magnesium 2.1 1.6 - 2.6 mg/dL   TSH with Reflex    Collection Time: 12/30/19  5:47 PM   Result Value Ref Range    TSH 1.82 0.30 - 5.00 mIU/L   Ethanol    Collection Time: 12/30/19  5:47 PM   Result Value Ref Range    Ethanol <10 <10 mg/dL    Ethanol percent <0.010 %   Comprehensive Metabolic Panel w/ Reflex to MG    Collection Time: 12/31/19  6:55 AM   Result Value Ref Range    Glucose 90 70 - 99 mg/dL    BUN 10 6 - 20 mg/dL    CREATININE 0.97 0.70 - 1.20 mg/dL    Bun/Cre Ratio NOT REPORTED 9 - 20    Calcium 9.3 8.6 - 10.4 mg/dL    Sodium 137 135 - 144 mmol/L    Potassium 4.4 3.7 - 5.3 mmol/L    Chloride 101 98 - 107 mmol/L    CO2 26 20 - 31 mmol/L    Anion Gap 10 9 - 17 mmol/L    Alkaline Phosphatase 75 40 - 129 U/L    ALT 33 5 - 41 U/L    AST 23 <40 U/L    Total Bilirubin 0.45 0.3 - 1.2 mg/dL    Total Protein 7.1 6.4 - 8.3 g/dL    Alb 4.0 3.5 - 5.2 g/dL    Albumin/Globulin Ratio NOT REPORTED 1.0 - 2.5    GFR Non-African American >60 >60 mL/min    GFR African American >60 >60 mL/min    GFR Comment          GFR Staging NOT REPORTED    Hemoglobin A1c    Collection Time: 12/31/19  6:55 AM   Result Value Ref Range    Hemoglobin A1C 5.3 4.0 - 6.0 %    Estimated Avg Glucose 105 mg/dL   Lipid panel - fasting    Collection Time: 12/31/19  6:55 AM   Result Value Ref Range    Cholesterol 145 <200 mg/dL    HDL 44 >40 mg/dL    LDL Cholesterol 94 0 - 130 mg/dL    Chol/HDL Ratio 3.3 <5    Triglycerides 36 <150 mg/dL    VLDL NOT REPORTED 1 - 30 mg/dL       Medications  Current Facility-Administered Medications   Medication Dose Route Frequency Provider Last Rate Last Dose    ARIPiprazole (ABILIFY) tablet 5 mg  5 mg Oral Daily [place] Coffee, APRN - CNP   5 mg at 01/01/20 0919    acetaminophen (TYLENOL) tablet 650 mg  650 mg Oral Q4H PRN [employer] Coffee, APRN - CNP   650 mg at 12/31/19 1037    traZODone (DESYREL) tablet 50 mg  50 mg Oral Nightly PRN [place] Coffee, APRN - CNP   50 mg at 12/30/19 2337    benztropine mesylate (COGENTIN) injection 2 mg  2 mg Intramuscular BID PRN [employer] Coffee, APRN - CNP        magnesium hydroxide (MILK OF MAGNESIA) 400 MG/5ML suspension 30 mL  30 mL Oral Daily PRN [place] Coffee, APRN - CNP        aluminum & magnesium

## 2020-01-01 NOTE — PLAN OF CARE
5 [facility]  Day 3 Interdisciplinary Treatment Plan NOTE    Review Date & Time: 1/1/2020  1306     Admission Type:   Admission Type: Voluntary    Reason for admission:  Reason for Admission: suicidal thoughts, paranoid thinking  Estimated Length of Stay: 5-7 days  Estimated Discharge Date Update: to be determined by physician    PATIENT STRENGTHS:  Patient Strengths Strengths: Communication, Positive Support  Patient Strengths and Limitations:Limitations: Tendency to isolate self, Unrealistic self-view, General negative or hopeless attitude about future/recovery, Difficulty problem solving/relies on others to help solve problems, Difficult relationships / poor social skills, Inappropriate/potentially harmful leisure interests  Addictive Behavior:Addictive Behavior  In the past 3 months, have you felt or has someone told you that you have a problem with:  : None  Do you have a history of Chemical Use?: No  Do you have a history of Alcohol Use?: No  Do you have a history of Street Drug Abuse?: No  Histroy of Prescripton Drug Abuse?: No  Medical Problems:  Past Medical History:   Diagnosis Date    Severe manic bipolar I disorder w/psychotic features, mood-congruent ([facility] [address]        Risk:  Fall RiskTotal: 59  Heath Scale Heath Scale Score: 22  BVC Total: 0  Change in scores no Changes to plan of Care no    Status EXAM:   Status and Exam  Normal: No  Facial Expression: Expressionless  Affect: Blunt  Level of Consciousness: Alert  Mood:Normal: No  Mood: Anxious  Motor Activity:Normal: Yes  Interview Behavior: Cooperative, Impulsive  Preception: [place] to Person, Michaelene Boy to Time, [place] to Place, [place] to Situation  Attention:Normal: No  Attention: Distractible  Thought Processes: Circumstantial  Thought Content:Normal: No  Thought Content: Preoccupations  Hallucinations: None  Delusions: No  Memory:Normal: Yes  Insight and Judgment: No  Insight and Judgment: Poor Insight, Poor Judgment  Present Suicidal Ideation: No  Present Homicidal Ideation: No    Daily Assessment Last Entry:   Daily Sleep (WDL): Within Defined Limits         Patient Currently in Pain: No  Daily Nutrition (WDL): Within Defined Limits    Patient Monitoring:  Frequency of Checks: 4 times per hour, close    Psychiatric Symptoms:   Depression Symptoms  Depression Symptoms: Impaired concentration  Anxiety Symptoms  Anxiety Symptoms: Generalized  Maria Ines Symptoms  Maria Ines Symptoms: No problems reported or observed. Psychosis Symptoms  Delusion Type: No problems reported or observed.     Suicide Risk CSSR-S:  1) Within the past month, have you wished you were dead or wished you could go to sleep and not wake up? : No  2) Have you actually had any thoughts of killing yourself? : No  6) Have you ever done anything, started to do anything, or prepared to do anything to end your life?: Yes  Change in Resultno Change in Plan of care no      EDUCATION:   EDUCATION:   Learner Progress Toward Treatment Goals: Reviewed results and recommendations of this team, Reviewed group plan and strategies, Reviewed signs, symptoms and risk of self harm and violent behavior, Reviewed goals and plan of care    Method:small group, individual verbal education    Outcome:verbalized by patient, but needs reinforcement to obtain goals    PATIENT GOALS:  Short term: mood stabilization   Long term: do the best I can     PLAN/TREATMENT RECOMMENDATIONS UPDATE: continue with group therapies, increased socialization, continue planning for after discharge goals, continue with medication compliance    SHORT-TERM GOALS UPDATE:   Time frame for Short-Term Goals: 5-7 days    LONG-TERM GOALS UPDATE:   Time frame for Long-Term Goals: 6 months  Members Present in Team Meeting: See Signature Sheet    KARELY Milian

## 2020-01-02 PROCEDURE — 1240000000 HC EMOTIONAL WELLNESS R&B

## 2020-01-02 PROCEDURE — 6370000000 HC RX 637 (ALT 250 FOR IP): Performed by: NURSE PRACTITIONER

## 2020-01-02 PROCEDURE — 99232 SBSQ HOSP IP/OBS MODERATE 35: CPT | Performed by: NURSE PRACTITIONER

## 2020-01-02 RX ORDER — ARIPIPRAZOLE 10 MG/1
10 TABLET ORAL DAILY
Status: DISCONTINUED | OUTPATIENT
Start: 2020-01-03 | End: 2020-01-07 | Stop reason: HOSPADM

## 2020-01-02 RX ADMIN — ARIPIPRAZOLE 5 MG: 5 TABLET ORAL at 08:07

## 2020-01-02 RX ADMIN — ACETAMINOPHEN 650 MG: 325 TABLET, FILM COATED ORAL at 21:54

## 2020-01-02 RX ADMIN — TRAZODONE HYDROCHLORIDE 50 MG: 50 TABLET ORAL at 21:54

## 2020-01-02 ASSESSMENT — PAIN DESCRIPTION - LOCATION
LOCATION: HEAD
LOCATION: HEAD

## 2020-01-02 ASSESSMENT — PAIN DESCRIPTION - DESCRIPTORS: DESCRIPTORS: HEADACHE

## 2020-01-02 ASSESSMENT — PAIN DESCRIPTION - PAIN TYPE: TYPE: ACUTE PAIN

## 2020-01-02 ASSESSMENT — PAIN SCALES - GENERAL
PAINLEVEL_OUTOF10: 2
PAINLEVEL_OUTOF10: 0

## 2020-01-02 NOTE — GROUP NOTE
Group Therapy Note    Date: 1/2/2020    Group Start Time: 1430  Group End Time: 8323  Group Topic: Psychoeducation    ARINA Du, LISAS    Group Therapy Note    Attendees: 9    Patient's Goal:  Pt will demonstrate improved interpersonal skills    Notes:  Pt attended group and participated    Status After Intervention:  Improved    Participation Level:  Active Listener and Interactive    Participation Quality: Appropriate, Attentive, Sharing and Supportive      Speech:  normal      Thought Process/Content: Logical  Linear      Affective Functioning: Congruent      Mood: euthymic      Level of consciousness:  Alert, Oriented x4 and Attentive      Response to Learning: Able to verbalize current knowledge/experience, Able to verbalize/acknowledge new learning, Able to retain information, Capable of insight, Able to change behavior and Progressing to goal      Endings: None Reported    Modes of Intervention: Education, Support, Socialization, Exploration and Media      Discipline Responsible: Psychoeducational Specialist      Signature:  Viji Mccann, [address]

## 2020-01-02 NOTE — GROUP NOTE
Group Therapy Note    Date: 1/2/2020    Group Start Time: 1330  Group End Time: 7050  Group Topic: Psychoeducation    ARINA SINGER    Ann-Marie Fish        Group Therapy Note    Attendees: 11/24         Patient's Goal:  To increase knowledge of leisure/recreation and demonstrate improved interpersonal skills    Notes:  Patient was pleasant and appropriate throughout the session     Status After Intervention:  Improved    Participation Level:  Active Listener and Interactive    Participation Quality: Appropriate, Attentive, Sharing and Supportive      Speech:  normal      Thought Process/Content: Logical      Affective Functioning: Congruent      Mood: euthymic      Level of consciousness:  Alert, Oriented x4 and Attentive      Response to Learning: Able to verbalize current knowledge/experience, Able to verbalize/acknowledge new learning, Able to change behavior and Progressing to goal      Endings: None Reported    Modes of Intervention: Education, Support, Socialization, Exploration, Clarifying, Problem-solving and Activity      Discipline Responsible: Psychoeducational Specialist      Signature:  Trini Sales

## 2020-01-02 NOTE — GROUP NOTE
Group Therapy Note    Date: 1/2/2020    Group Start Time: 1000  Group End Time: 1050  Group Topic: Psychoeducation    ARINA Du, LISAS    Group Therapy Note    Attendees: 7    Patient's Goal:  Pt will identify multiple new leisure skills    Notes:  Pt attended group and participated    Status After Intervention:  Improved    Participation Level:  Active Listener and Interactive    Participation Quality: Appropriate, Attentive, Sharing and Supportive      Speech:  normal      Thought Process/Content: Logical        Affective Functioning: Constricted      Mood: euthymic      Level of consciousness:  Alert, Oriented x4 and Attentive      Response to Learning: Able to verbalize current knowledge/experience, Able to verbalize/acknowledge new learning, Able to retain information    Endings: None Reported    Modes of Intervention: Education, Support, Socialization, Exploration and Activity      Discipline Responsible: Psychoeducational Specialist      Signature:  Umu Reynoso, [address]

## 2020-01-02 NOTE — PLAN OF CARE
1:1 with pt x ten minutes. Pt encouraged to attend unit programming and interact with peers and staff. Pt also encouraged to tend to hygiene and ADLs. Pt encouraged to discuss feelings with staff and feedback and reassurance provided. Pt denies thoughts of self harm and is agreeable to seeking out should thoughts of self harm arise. Safe environment maintained. Q15 minute checks for safety cont per unit policy. Will cont to monitor for safety and provides support and reassurance as needed. Pt has much self talk while in room. Does attend group with participation. Social with peers.

## 2020-01-02 NOTE — GROUP NOTE
Group Therapy Note    Date: January 2, 2020    Group Start Time: 1100    Group End Time: 1986  Group Topic: Psychotherapy    [address], LPC    Group Therapy Note    Attendees: 5/11    Patient's Goal: relationships with family/others and improving coping strategies     Notes:  participated in group fully    Status After Intervention:  Improved    Participation Level:  Active Listener and Interactive    Participation Quality: Appropriate, Attentive and Sharing    Speech:  Normal    Thought Process/Content: Logical    Affective Functioning: Congruent    Mood: Euthymic and anxious at times    Level of consciousness:  Alert, Oriented x4 and Attentive    Response to Learning: Able to verbalize current knowledge/experience, Able to verbalize/acknowledge new learning, Able to retain information, Capable of insight, Able to change behavior and Progressing to goal    Endings: None Reported    Modes of Intervention: Support, Exploration, Clarifying and Problem-solving    Discipline Responsible: /Counselor    Signature:  Caren Jorgensen MRC, CRC, LPC

## 2020-01-02 NOTE — GROUP NOTE
Group Therapy Note    Date: 1/2/2020    Group Start Time: 0900  Group End Time: 8870  Group Topic: Community Meeting    ARINA SINGER    Geraldo Flores        Group Therapy Note    Attendees: 11/23         Patient's Goal:  Patient stated that his goal for today is \"try to figure stuff out\". Notes:  Patient was encouraged to speak with social work to figure out where he will need to get follow up care and what other aspects of discharge he needs to focus on. Status After Intervention:  Unchanged    Participation Level:  Active Listener    Participation Quality: Appropriate and Attentive      Speech:  normal      Thought Process/Content: Logical      Affective Functioning: Congruent      Mood: euthymic      Level of consciousness:  Alert and Oriented x4      Response to Learning: Able to verbalize current knowledge/experience, Able to verbalize/acknowledge new learning and Progressing to goal      Endings: None Reported    Modes of Intervention: Education, Support, Socialization, Exploration, Clarifying and Problem-solving      Discipline Responsible: Psychoeducational Specialist      Signature:  Geraldo Flores

## 2020-01-02 NOTE — PLAN OF CARE
[facility]  Initial Interdisciplinary Treatment Plan NO      Original treatment plan Date & Time: 1/2/19 1301    Admission Type:  Admission Type: Voluntary    Reason for admission:   Reason for Admission: suicidal thoughts, paranoid thinking    Estimated Length of Stay:  5-7days  Estimated Discharge Date: to be determined by physician    PATIENT STRENGTHS:  Patient Strengths:Strengths: Communication, Positive Support  Patient Strengths and Limitations:Limitations: Tendency to isolate self, Unrealistic self-view, General negative or hopeless attitude about future/recovery, Difficulty problem solving/relies on others to help solve problems, Difficult relationships / poor social skills, Inappropriate/potentially harmful leisure interests  Addictive Behavior: Addictive Behavior  In the past 3 months, have you felt or has someone told you that you have a problem with:  : None  Do you have a history of Chemical Use?: No  Do you have a history of Alcohol Use?: No  Do you have a history of Street Drug Abuse?: No  Histroy of Prescripton Drug Abuse?: No  Medical Problems:  Past Medical History:   Diagnosis Date    Severe manic bipolar I disorder w/psychotic features, mood-congruent (HCC)      Status EXAM:Status and Exam  Normal: No  Facial Expression: Expressionless  Affect: Blunt  Level of Consciousness: Alert  Mood:Normal: No  Mood: Helpless, Empty  Motor Activity:Normal: No  Motor Activity: Decreased  Interview Behavior: Cooperative  Preception: [place] to Person, Lizabeth Lila to Time, [place] to Place, [place] to Situation  Attention:Normal: No  Attention: Distractible  Thought Processes: Blocking  Thought Content:Normal: No  Thought Content: Preoccupations  Hallucinations: None  Delusions: No  Memory:Normal: Yes  Insight and Judgment: No  Insight and Judgment: Poor Insight, Poor Judgment  Present Suicidal Ideation: No  Present Homicidal Ideation: No    EDUCATION:   Learner Progress Toward Treatment Goals: reviewed group plans and strategies for care    Method:group therapy, medication compliance, individualized assessments and care planning    Outcome: needs reinforcement    PATIENT GOALS: to be discussed with patient within 72 hours    PLAN/TREATMENT RECOMMENDATIONS:     continue group therapy , medications compliance, goal setting, individualized assessments and care, continue to monitor pt on unit      SHORT-TERM GOALS:   Time frame for Short-Term Goals: 5-7 days    LONG-TERM GOALS:  Time frame for Long-Term Goals: 6 months  Members Present in Team Meeting: See Signature Sheet    Tutu Clements

## 2020-01-02 NOTE — PROGRESS NOTES
48.9 41 - 53 %    MCV 90.4 80 - 100 fL    MCH 30.6 26 - 34 pg    MCHC 33.9 31 - 37 g/dL    RDW 13.6 11.5 - 14.9 %    Platelets 788 209 - 485 k/uL    MPV 7.8 6.0 - 12.0 fL    NRBC Automated NOT REPORTED per 100 WBC    Differential Type NOT REPORTED     Seg Neutrophils 77 (H) 36 - 66 %    Lymphocytes 16 (L) 24 - 44 %    Monocytes 7 1 - 7 %    Eosinophils % 0 0 - 4 %    Basophils 0 0 - 2 %    Immature Granulocytes NOT REPORTED 0 %    Segs Absolute 7.10 1.3 - 9.1 k/uL    Absolute Lymph # 1.50 1.0 - 4.8 k/uL    Absolute Mono # 0.70 0.1 - 1.3 k/uL    Absolute Eos # 0.00 0.0 - 0.4 k/uL    Basophils Absolute 0.00 0.0 - 0.2 k/uL    Absolute Immature Granulocyte NOT REPORTED 0.00 - 0.30 k/uL    WBC Morphology NOT REPORTED     RBC Morphology NOT REPORTED     Platelet Estimate NOT REPORTED    Basic Metabolic Panel    Collection Time: 12/30/19  5:47 PM   Result Value Ref Range    Glucose 154 (H) 70 - 99 mg/dL    BUN 11 6 - 20 mg/dL    CREATININE 0.81 0.70 - 1.20 mg/dL    Bun/Cre Ratio NOT REPORTED 9 - 20    Calcium 9.8 8.6 - 10.4 mg/dL    Sodium 136 135 - 144 mmol/L    Potassium 3.9 3.7 - 5.3 mmol/L    Chloride 101 98 - 107 mmol/L    CO2 22 20 - 31 mmol/L    Anion Gap 13 9 - 17 mmol/L    GFR Non-African American >60 >60 mL/min    GFR African American >60 >60 mL/min    GFR Comment          GFR Staging NOT REPORTED    Magnesium    Collection Time: 12/30/19  5:47 PM   Result Value Ref Range    Magnesium 2.1 1.6 - 2.6 mg/dL   TSH with Reflex    Collection Time: 12/30/19  5:47 PM   Result Value Ref Range    TSH 1.82 0.30 - 5.00 mIU/L   Ethanol    Collection Time: 12/30/19  5:47 PM   Result Value Ref Range    Ethanol <10 <10 mg/dL    Ethanol percent <0.010 %   Comprehensive Metabolic Panel w/ Reflex to MG    Collection Time: 12/31/19  6:55 AM   Result Value Ref Range    Glucose 90 70 - 99 mg/dL    BUN 10 6 - 20 mg/dL    CREATININE 0.97 0.70 - 1.20 mg/dL    Bun/Cre Ratio NOT REPORTED 9 - 20    Calcium 9.3 8.6 - 10.4 mg/dL    Sodium 137 135 - 144 mmol/L    Potassium 4.4 3.7 - 5.3 mmol/L    Chloride 101 98 - 107 mmol/L    CO2 26 20 - 31 mmol/L    Anion Gap 10 9 - 17 mmol/L    Alkaline Phosphatase 75 40 - 129 U/L    ALT 33 5 - 41 U/L    AST 23 <40 U/L    Total Bilirubin 0.45 0.3 - 1.2 mg/dL    Total Protein 7.1 6.4 - 8.3 g/dL    Alb 4.0 3.5 - 5.2 g/dL    Albumin/Globulin Ratio NOT REPORTED 1.0 - 2.5    GFR Non-African American >60 >60 mL/min    GFR African American >60 >60 mL/min    GFR Comment          GFR Staging NOT REPORTED    Hemoglobin A1c    Collection Time: 12/31/19  6:55 AM   Result Value Ref Range    Hemoglobin A1C 5.3 4.0 - 6.0 %    Estimated Avg Glucose 105 mg/dL   Lipid panel - fasting    Collection Time: 12/31/19  6:55 AM   Result Value Ref Range    Cholesterol 145 <200 mg/dL    HDL 44 >40 mg/dL    LDL Cholesterol 94 0 - 130 mg/dL    Chol/HDL Ratio 3.3 <5    Triglycerides 36 <150 mg/dL    VLDL NOT REPORTED 1 - 30 mg/dL       Medications  Current Facility-Administered Medications   Medication Dose Route Frequency Provider Last Rate Last Dose    [START ON 1/3/2020] ARIPiprazole (ABILIFY) tablet 10 mg  10 mg Oral Daily Destiny Yoo, APRN - CNP        acetaminophen (TYLENOL) tablet 650 mg  650 mg Oral Q4H PRN HungOrange Coffee, APRN - CNP   650 mg at 01/01/20 1920    traZODone (DESYREL) tablet 50 mg  50 mg Oral Nightly PRN HungOrange Coffee, APRN - CNP   50 mg at 12/30/19 2337    benztropine mesylate (COGENTIN) injection 2 mg  2 mg Intramuscular BID PRN HungOrange Coffee, APRN - CNP        magnesium hydroxide (MILK OF MAGNESIA) 400 MG/5ML suspension 30 mL  30 mL Oral Daily PRN [place] Coffee, APRN - CNP        aluminum & magnesium hydroxide-simethicone (MAALOX) 200-200-20 MG/5ML suspension 30 mL  30 mL Oral Q6H PRN HungOrange Coffee, APRN - CNP        haloperidol lactate (HALDOL) injection 5 mg  5 mg Intramuscular Q6H PRN GloLAMONTE Menjivar - CNP        And    LORazepam (ATIVAN) injection 1 mg  1 mg Intramuscular Q6H SALVADORN Kaley BONILLA LAMONTE Houston CNP             [START ON 1/3/2020] ARIPiprazole  10 mg Oral Daily       ASSESSMENT  Bipolar 1 disorder ([facility] [address]     Patient's Response to Treatment: slowly    PLAN  · Continue inpatient psychiatric treatment  · Supportive therapy with medication management. Reviewed risks and benefits as well as potential side effects with patient. If patient tolerates Abilify will trial Abilify maintenna. · Increase abilify to 10 mg daily. Plan to continue to titrate with goal of starting [place]. · Therapeutic activities and groups  · Follow up at [facility] after symptoms stabilized     Estimated length of stay will be 4-6 additional days. Electronically signed by LAMONTE De La Rosa CNP on 1/2/2020 at 2:28 PM.    Dragon voice recognition software used in portions of this document.

## 2020-01-03 PROCEDURE — 6370000000 HC RX 637 (ALT 250 FOR IP): Performed by: NURSE PRACTITIONER

## 2020-01-03 PROCEDURE — 1240000000 HC EMOTIONAL WELLNESS R&B

## 2020-01-03 PROCEDURE — 99232 SBSQ HOSP IP/OBS MODERATE 35: CPT | Performed by: NURSE PRACTITIONER

## 2020-01-03 RX ADMIN — TRAZODONE HYDROCHLORIDE 50 MG: 50 TABLET ORAL at 22:27

## 2020-01-03 RX ADMIN — ARIPIPRAZOLE 10 MG: 10 TABLET ORAL at 08:52

## 2020-01-03 NOTE — PROGRESS NOTES
Department of Psychiatry  Attending Progress Note    Chief Complaint: Bipolar 1 disorder ([facility] [address]     SUBJECTIVE:  The patient is seen today in the day area. He feels that his mood is improving slowly. He reports that he has been doing a lot of thinking and realizes that he needs to make changes in his life in order to remain positive. His mood is euphoric at times. He sates he has some mild anxiety. He denies SI or HI. No symptoms of psychosis observed. Reports sleep and appetite have improved. Attending select groups. Insight and judgment are impaired. Explored his  feelings and concerns. Reassurance and support provided. Charting and medications reviewed. Denies any side effects from medications. There is no identifiable safe alternative other than continued hospitalization. OBJECTIVE    Physical  BP (!) 148/92   Pulse 98   Temp 97.8 °F (36.6 °C) (Oral)   Resp 14   Ht 6' (1.829 m)   Wt 200 lb (90.7 kg)   SpO2 98%   BMI 27.12 kg/m²      Mental Status Evaluation:  Orientation: alertness: alert   Mood:. elevated      Affect:  labile and mood-congruent      Appearance:  age appropriate and casually dressed   Activity:  Within Normal Limits   Gait/Posture: Normal   Speech:  normal pitch and normal volume   Thought Process:  within normal limits   Thought Content:  Denies SI and HI. No psychosis. Sensorium:  person, place, time/date and situation   Cognition:  grossly intact   Memory: intact   Insight:  limited   Judgment: limited   Suicidal Ideations: denies suicidal ideation   Homicidal Ideations: Negative for homicidal ideation      Medication Side Effects: absent       Attention Span attention span and concentration were age appropriate       Labs  No results found for this or any previous visit (from the past 67 hour(s)).     Medications  Current Facility-Administered Medications   Medication Dose Route Frequency Provider Last Rate Last Dose    ARIPiprazole (ABILIFY) tablet 10 mg  10 mg Oral Daily LAMONTE Martin CNP   10 mg at 01/03/20 4540    acetaminophen (TYLENOL) tablet 650 mg  650 mg Oral Q4H PRN [place] Coffee, APRN - CNP   650 mg at 01/02/20 2154    traZODone (DESYREL) tablet 50 mg  50 mg Oral Nightly PRN [place] Coffee, APRN - CNP   50 mg at 01/02/20 2154    benztropine mesylate (COGENTIN) injection 2 mg  2 mg Intramuscular BID PRN [place] Coffee, APRN - CNP        magnesium hydroxide (MILK OF MAGNESIA) 400 MG/5ML suspension 30 mL  30 mL Oral Daily PRN Hungary Coffee, APRN - CNP        aluminum & magnesium hydroxide-simethicone (MAALOX) 200-200-20 MG/5ML suspension 30 mL  30 mL Oral Q6H PRN [place] Coffee, APRN - CNP        haloperidol lactate (HALDOL) injection 5 mg  5 mg Intramuscular Q6H PRN Ezzard NashvilleLAMONTE CNP        And    LORazepam (ATIVAN) injection 1 mg  1 mg Intramuscular Q6H PRN Ezzard [place], LAMONTE - CNP             ARIPiprazole  10 mg Oral Daily       ASSESSMENT  Bipolar 1 disorder ([facility] [address]     Patient's Response to Treatment: slowly    PLAN  · Continue inpatient psychiatric treatment  · Supportive therapy with medication management. Reviewed risks and benefits as well as potential side effects with patient. If patient tolerates Abilify will trial Abilify maintenna. · Increase abilify to 10 mg daily 1/3. Plan to continue to titrate with goal of starting [place]. · Therapeutic activities and groups  · Follow up at [facility] [place] after symptoms stabilized     Estimated length of stay will be 4-6 additional days. Electronically signed by LAMONTE Jenkins CNP on 1/3/2020 at 11:30 AM.    Dragon voice recognition software used in portions of this document.

## 2020-01-03 NOTE — GROUP NOTE
Group Therapy Note    Date: 1/3/2020    Group Start Time: 0900  Group End Time: 9635  Group Topic: [facility], [facility]    Patient refused to attend Community Meeting/Goal Setting Group after encouragement from staff. 1:1 talk time offered but patient refused.      Signature:  Lamont Otero

## 2020-01-03 NOTE — GROUP NOTE
Group Therapy Note    Date: 1/3/2020    Group Start Time: 1600  Group End Time: 1978  Group Topic: Healthy Living/Wellness    [facility]    Galo Olsen RN        Group Therapy Note    Attendees: 9      Patient's Goal:  Wellness Positive talk     Participation Level:  Active Listener and Interactive    Participation Quality: Appropriate, Attentive, Sharing and Supportive    Level of consciousness:  Alert and Oriented x4      Response to Learning: Able to verbalize current knowledge/experience    Modes of Intervention: Education, Support, Socialization, Problem-solving and Activity      Discipline Responsible: Registered Nurse      Signature:  Galo Olsen RN

## 2020-01-03 NOTE — GROUP NOTE
Group Therapy Note    Date: January 3, 2020    Group Start Time: 1100    Group End Time: 1140  Group Topic: Psychotherapy    ARINA Delong LPC    Group Therapy Note    Attendees: 5/11    Patient's Goal: relationships with family/others and improving insight and support systems     Notes:  participated in group fully    Status After Intervention:  Improved    Participation Level:  Active Listener and Interactive    Participation Quality: Appropriate, Attentive and Sharing    Speech:  Normal    Thought Process/Content: Logical    Affective Functioning: Congruent    Mood: Euthymic     Level of consciousness:  Alert, Oriented x4 and Attentive    Response to Learning: Able to verbalize current knowledge/experience, Able to verbalize/acknowledge new learning, Able to retain information, Capable of insight, Able to change behavior and Progressing to goal    Endings: None Reported    Modes of Intervention: Support, Exploration, Clarifying and Problem-solving    Discipline Responsible: /Counselor    Signature:  Catracho Beal MRC, CRC, LPC

## 2020-01-03 NOTE — PLAN OF CARE
Problem: Altered Mood, Depressive Behavior:  Goal: Able to verbalize and/or display a decrease in depressive symptoms  Description  Able to verbalize and/or display a decrease in depressive symptoms  1/2/2020 2110 by Jose R Hernandez RN  Outcome: Ongoing     Problem: Altered Mood, Depressive Behavior:  Goal: Ability to disclose and discuss suicidal ideas will improve  Description  Ability to disclose and discuss suicidal ideas will improve  1/2/2020 2110 by Jose R Hernandez RN  Outcome: Ongoing  Note:   Pt denies thoughts of self harm and is agreeable to seeking out should thoughts of self harm arise. Safe environment maintained. Q15 minute checks for safety cont per unit policy. Will cont to monitor for safety and provides support and reassurance as needed. Pt isolative to self on the unit. Talked on the phone at length. Appropriate with staff and peers. Pt came to staff for needs. Pt accepting of nourishment and fluids.

## 2020-01-03 NOTE — GROUP NOTE
Group Therapy Note    Date: 1/3/2020    Group Start Time: 1430  Group End Time: 1500  Group Topic: Relaxation    [facility]    Emeka Sierra    Patient's Goal:  To increase positive coping skills     Notes:      Status After Intervention:  Improved    Participation Level:  Active Listener and Interactive    Participation Quality: Appropriate, Attentive and Sharing      Speech:  normal      Thought Process/Content: Logical  Linear      Affective Functioning: Congruent      Mood: euthymic      Level of consciousness:  Alert, Oriented x4 and Attentive      Response to Learning: Able to verbalize current knowledge/experience, Able to verbalize/acknowledge new learning, Able to retain information and Progressing to goal      Endings: None Reported    Modes of Intervention: Education, Support, Socialization, Exploration, Clarifying, Problem-solving and Activity      Discipline Responsible: Psychoeducational Specialist      Signature:  Emeka Sierra

## 2020-01-04 PROCEDURE — 6370000000 HC RX 637 (ALT 250 FOR IP): Performed by: NURSE PRACTITIONER

## 2020-01-04 PROCEDURE — 99232 SBSQ HOSP IP/OBS MODERATE 35: CPT | Performed by: PSYCHIATRY & NEUROLOGY

## 2020-01-04 PROCEDURE — 1240000000 HC EMOTIONAL WELLNESS R&B

## 2020-01-04 RX ADMIN — TRAZODONE HYDROCHLORIDE 50 MG: 50 TABLET ORAL at 22:16

## 2020-01-04 RX ADMIN — ARIPIPRAZOLE 10 MG: 10 TABLET ORAL at 08:08

## 2020-01-04 NOTE — GROUP NOTE
Group Therapy Note    Date: 1/4/2020    Group Start Time: 1600  Group End Time: 36  Group Topic: Healthy Living/Wellness    RUST JACK Jo LPN        Group Therapy Note    Attendees: 12         Patient's Goal:  To decrease stress & anxiety    Notes:      Status After Intervention:  Improved    Participation Level: Interactive    Participation Quality: Attentive      Speech:  normal      Thought Process/Content: Logical      Affective Functioning: Flat      Mood: anxious      Level of consciousness:  Oriented x4      Response to Learning: Capable of insight      Endings: None Reported    Modes of Intervention: Socialization      Discipline Responsible: Licensed Practical Nurse      Signature:  Charisma Raines LPN

## 2020-01-04 NOTE — GROUP NOTE
Group Therapy Note    Date: 1/4/2020    Group Start Time: 1330  Group End Time: 7226  Group Topic: Psychoeducation    [facility]    Patient refused to attend coping skills identification group at 1330 after encouragement from staff. 1:1 talk time offered by staff as alternative to group session.

## 2020-01-04 NOTE — GROUP NOTE
Group Therapy Note    Date: 1/4/2020    Group Start Time: 1000  Group End Time: 5957  Group Topic: Psychoeducation    [facility]    MABLE Bauman LSW        Group Therapy Note    Attendees: 8/24    patient refused to attend psychoeducation group at 1000 after encouragement from staff.          Signature:  MABLE Bauman LSW

## 2020-01-04 NOTE — GROUP NOTE
Group Therapy Note    Date: 1/4/2020    Group Start Time: 0900  Group End Time: 0930  Group Topic: Community Meeting    ARINA SINGER    KALIN Santos    Group Therapy Note    Attendees: 12    Patient's Goal:  Pt will identify personal daily goal    Notes:  Pt attended group and participated. Pt asked to speak with writer following group and stated \"I am sorry for how I made you feel during group on Thursday. \" Pt elaborated about his sexual frustration and inability to stop himself from becoming aroused. Pt states \"I just want to have sex so bad and I can't help it. \"  Pt states he has no intention of touching others or acting on his urges but feels he has a hard time controlling himself. Writer assured pt he was safe here and he could speak with staff if he felt unable to control himself. Status After Intervention:  Improved    Participation Level:  Active Listener and Interactive    Participation Quality: Appropriate, Attentive, Sharing and Supportive      Speech:  normal      Thought Process/Content: Logical  Linear      Affective Functioning: Congruent      Mood: euthymic      Level of consciousness:  Alert and Attentive      Response to Learning: Able to verbalize current knowledge/experience, Able to verbalize/acknowledge new learning, Able to retain information    Endings: None Reported    Modes of Intervention: Education, Support, Socialization, Exploration, Activity and Limit-setting      Discipline Responsible: Psychoeducational Specialist      Signature:  Nilsa Ortiz, [address]

## 2020-01-05 PROCEDURE — 1240000000 HC EMOTIONAL WELLNESS R&B

## 2020-01-05 PROCEDURE — 99232 SBSQ HOSP IP/OBS MODERATE 35: CPT | Performed by: PSYCHIATRY & NEUROLOGY

## 2020-01-05 PROCEDURE — 6370000000 HC RX 637 (ALT 250 FOR IP): Performed by: NURSE PRACTITIONER

## 2020-01-05 RX ADMIN — TRAZODONE HYDROCHLORIDE 50 MG: 50 TABLET ORAL at 22:33

## 2020-01-05 RX ADMIN — ARIPIPRAZOLE 10 MG: 10 TABLET ORAL at 08:17

## 2020-01-05 NOTE — GROUP NOTE
Group Therapy Note    Date: 1/5/2020    Group Start Time: 1330  Group End Time: 8841  Group Topic: Psychoeducation    KALIN Villalpando    Patient refused to attend problem solving group at 1330 after encouragement from staff. 1:1 talk time offered by staff as alternative to group session.

## 2020-01-05 NOTE — GROUP NOTE
Group Therapy Note    Date: 1/5/2020    Group Start Time: 1000  Group End Time: 9828  Group Topic: Psychoeducation    [facility]    MABLE Arrington, AMY        Group Therapy Note    Attendees: 6/24       patient refused to attend psychoeducation group at [id number] after encouragement from staff.          Signature:  MABLE Arirngton, AMY

## 2020-01-05 NOTE — GROUP NOTE
Group Therapy Note    Date: 1/4/2020    Group Start Time: 2100  Group End Time: 2130  Group Topic: Wrap-Up    ARINA BHMELANIE SINGER    Alicja Velásquez RN        Group Therapy Note    Attendees: 11       Discipline Responsible:   OT  AT  [facility]. x RT  Other       Participation Level:     None  Minimal   x Active Listener x Interactive    Monopolizing         Participation Quality:  x Appropriate  Inappropriate   x       Attentive        Intrusive   x       Sharing        Resistant   x       Supportive        Lethargic       Affective:   x Congruent  Incongruent  Blunted  Flat    Constricted  Anxious  Elated  Angry    Labile  Depressed  Other         Cognitive:  x Alert  Oriented PPTP     Concentration x G  F  P   Attention Span x G  F  P   Short-Term Memory x G  F  P   Long-Term Memory x G  F  P   ProblemSolving/  Decision Making x G  F  P   Ability to Process  Information x G  F  P      Contributing Factors             Delusional             Hallucinating             Flight of Ideas             Other:       Modes of Intervention:  x Education  Support  Exploration   x Clarifying  Problem Solving  Confrontation    Socialization  Limit Setting  Reality Testing    Activity  Movement  Media    Other:            Response to Learning:  x Able to verbalize current knowledge/experience   x Able to verbalize/acknowledge new learning    Able to retain information    Capable of insight    Able to change behavior    Progressing to goal    Other:        Comments:         Signature:  Alicja Veálsquez RN

## 2020-01-05 NOTE — PLAN OF CARE
Denies suicidal/homicidal ideation, denies hallucinations. Reports no issues with sleep or appetite. Fifteen minute checks maintained for patient safety. Attending groups. Reports less depression than upon admission, preoccupied with sex.

## 2020-01-05 NOTE — GROUP NOTE
Group Therapy Note    Date: 1/5/2020    Group Start Time: 0900  Group End Time: 3672  Group Topic: Community Meeting    KALIN John    Group Therapy Note    Attendees: 7    Patient's Goal:  Pt will demonstrate improved interpersonal skills and identify one personal daily goal    Notes:  Pt attended group but did not participated.   When asked, pt states his goal is to \"be the goal.\"    Status After Intervention:  Improved    Participation Level: Resistant    Participation Quality: Appropriate      Speech:  normal      Thought Process/Content: Flight of ideas      Affective Functioning: Incongruent      Mood: euthymic      Level of consciousness:  Alert      Response to Learning: Able to verbalize current knowledge/experience, Able to verbalize/acknowledge new learning, Able to retain information, Capable of insight, Able to change behavior and Progressing to goal      Endings: None Reported    Modes of Intervention: Education, Support, Socialization, Exploration, Activity and Limit-setting      Discipline Responsible: Psychoeducational Specialist      Signature:  Sami Sams, [address]

## 2020-01-06 PROCEDURE — 6370000000 HC RX 637 (ALT 250 FOR IP): Performed by: PSYCHIATRY & NEUROLOGY

## 2020-01-06 PROCEDURE — 6370000000 HC RX 637 (ALT 250 FOR IP): Performed by: NURSE PRACTITIONER

## 2020-01-06 PROCEDURE — 1240000000 HC EMOTIONAL WELLNESS R&B

## 2020-01-06 PROCEDURE — 99232 SBSQ HOSP IP/OBS MODERATE 35: CPT | Performed by: NURSE PRACTITIONER

## 2020-01-06 RX ADMIN — TRAZODONE HYDROCHLORIDE 50 MG: 50 TABLET ORAL at 21:38

## 2020-01-06 RX ADMIN — ARIPIPRAZOLE 10 MG: 10 TABLET ORAL at 13:43

## 2020-01-06 RX ADMIN — NICOTINE POLACRILEX 2 MG: 2 GUM, CHEWING BUCCAL at 21:23

## 2020-01-06 NOTE — GROUP NOTE
Group Therapy Note    Date: 1/6/2020    Group Start Time: 1100  Group End Time: 1152  Group Topic: Psychotherapy    [facility]    MABLE Houser LSW        Group Therapy Note    Attendees: 6         Patient's Goal:  Pt will demonstrate increased interpersonal interaction. Notes:  Pt participated well in group.     Status After Intervention:  Improved    Participation Level: Interactive    Participation Quality: Appropriate      Speech:  normal      Thought Process/Content: Logical      Affective Functioning: Congruent      Mood: anxious      Level of consciousness:  Alert      Response to Learning: Progressing to goal      Endings: None Reported    Modes of Intervention: Support      Discipline Responsible: /Counselor      Signature:  MABLE Houser, AMY

## 2020-01-06 NOTE — FLOWSHEET NOTE
*Patient participated in the Spirituality Group       01/06/20 1532   Encounter Summary   Services provided to: Patient   Referral/Consult From: Rounding   Continue Visiting   (1/6/20)   Complexity of Encounter Low   Length of Encounter 30 minutes   Spiritual/Church   Type Spiritual support   Assessment Calm; Approachable   Intervention Active listening   Outcome Receptive

## 2020-01-06 NOTE — GROUP NOTE
Group Therapy Note    Date: 1/6/2020    Group Start Time: 0900  Group End Time: 0920  Group Topic: Cognitive Skills    ARINA SINGER    Ann-Marie Fish        Group Therapy Note    Attendees: 9/24         Patient's Goal:  Patient stated that his goal is \"to be discharged\". Notes:  Patient was pleasant and appropriate throughout the session. Status After Intervention:  Improved    Participation Level:  Active Listener and Interactive    Participation Quality: Appropriate, Attentive, Sharing and Supportive      Speech:  normal      Thought Process/Content: Logical      Affective Functioning: Congruent      Mood: euthymic      Level of consciousness:  Alert, Oriented x4 and Attentive      Response to Learning: Able to verbalize current knowledge/experience, Able to verbalize/acknowledge new learning, Capable of insight and Progressing to goal      Endings: None Reported    Modes of Intervention: Education, Support, Socialization, Exploration, Clarifying and Problem-solving      Discipline Responsible: Psychoeducational Specialist      Signature:  Tahira Feliciano

## 2020-01-06 NOTE — PROGRESS NOTES
Department of Psychiatry  Attending Progress Note    Chief Complaint: Bipolar 1 disorder ([facility] [address]     SUBJECTIVE:  Peng Estevez was seen in the day room today. Says his mood is \"good. \"  Denies SI, HI, AVH. Thought process is intermittently illogical.  Sexually preoccupied. Somewhat hyperverbal.      ROS:  Review of Systems completed and no physical complaints. Pertinent psychiatric information as noted in the HPI. OBJECTIVE:     Physical  BP (!) 147/87   Pulse 100   Temp 97.9 °F (36.6 °C) (Oral)   Resp 14   Ht 6' (1.829 m)   Wt 200 lb (90.7 kg)   SpO2 98%   BMI 27.12 kg/m²      Mental Status Evaluation:  Orientation: alertness: alert   Mood:. \"good\"      Affect:  Somewhat elevated      Appearance:  age appropriate and casually dressed   Activity:  Restless & fidgety   Gait/Posture: Normal   Speech:  pressured   Thought Process:  Illogical at times   Thought Content:  No overtly delusional content, although perhaps some mild grandiosity still present; sexually preoccupied   Sensorium:  person, place and situation   Cognition:  grossly intact   Memory: intact   Insight:  limited   Judgment: fair   Suicidal Ideations: denies suicidal ideation   Homicidal Ideations: Negative for homicidal ideation      Medication Side Effects: absent       Attention Span attention span appeared shorter than expected for age     Labs  No results found for this or any previous visit (from the past 67 hour(s)).     Medications  Current Facility-Administered Medications   Medication Dose Route Frequency Provider Last Rate Last Dose    ARIPiprazole ER (ABILIFY MAINTENA) injection 400 mg  400 mg Intramuscular Once Dena Ott MD        ARIPiprazole (ABILIFY) tablet 10 mg  10 mg Oral Daily LAMONTE Lopez - CNP   10 mg at 01/05/20 0817    acetaminophen (TYLENOL) tablet 650 mg  650 mg Oral Q4H PRN LAMONTE Steen - CNP   650 mg at 01/02/20 2154    traZODone (DESYREL) tablet 50 mg  50 mg Oral Nightly PRN [facility] Coffee, APRN - CNP   50 mg at 01/05/20 2233    benztropine mesylate (COGENTIN) injection 2 mg  2 mg Intramuscular BID PRN [place] Coffee, APRN - CNP        magnesium hydroxide (MILK OF MAGNESIA) 400 MG/5ML suspension 30 mL  30 mL Oral Daily PRN [place] Coffee, APRN - CNP        aluminum & magnesium hydroxide-simethicone (MAALOX) 200-200-20 MG/5ML suspension 30 mL  30 mL Oral Q6H PRN [place] Coffee, APRN - CNP        haloperidol lactate (HALDOL) injection 5 mg  5 mg Intramuscular Q6H PRN Jeffrie Bitter, APRN - CNP        And    LORazepam (ATIVAN) injection 1 mg  1 mg Intramuscular Q6H PRN Jeffrie Bitter, APRN - CNP             ARIPiprazole ER  400 mg Intramuscular Once    ARIPiprazole  10 mg Oral Daily       ASSESSMENT  Bipolar 1 disorder ([facility] [address]     Patient's Response to Treatment: slowly improving    PLAN  · Continue inpatient psychiatric admission  · Supportive therapy with medication management. · Therapeutic activities and groups  · Follow up at [facility] mental health [place] after symptoms stabilized  · Discharge planning with social work  · Continue Abilify 10 mg daily. Consider transitioning to [facility].     Malena Collet, MD  Psychiatrist

## 2020-01-06 NOTE — PROGRESS NOTES
9330    acetaminophen (TYLENOL) tablet 650 mg  650 mg Oral Q4H PRN [place] Coffee, APRN - CNP   650 mg at 01/02/20 2154    traZODone (DESYREL) tablet 50 mg  50 mg Oral Nightly PRN [place] Coffee, APRN - CNP   50 mg at 01/05/20 2233    benztropine mesylate (COGENTIN) injection 2 mg  2 mg Intramuscular BID PRN [place] Coffee, APRN - CNP        magnesium hydroxide (MILK OF MAGNESIA) 400 MG/5ML suspension 30 mL  30 mL Oral Daily PRN [place] Coffee, APRN - CNP        aluminum & magnesium hydroxide-simethicone (MAALOX) 200-200-20 MG/5ML suspension 30 mL  30 mL Oral Q6H PRN [place] Coffee, APRN - CNP        haloperidol lactate (HALDOL) injection 5 mg  5 mg Intramuscular Q6H PRN Bal Curb, APRN - CNP        And    LORazepam (ATIVAN) injection 1 mg  1 mg Intramuscular Q6H PRN Bal Curb, APRN - CNP             ARIPiprazole ER  400 mg Intramuscular Once    ARIPiprazole  10 mg Oral Daily       ASSESSMENT  Bipolar 1 disorder ([facility])     Patient's Response to Treatment: slowly improving    PLAN  · Continue inpatient psychiatric admission  · Supportive therapy with medication management. · Therapeutic activities and groups  · [facility] [place] [facility]  · Discharge planning with social work  · Will schedule Maintena 400 mg to be administered tomorrow. Will need to continue Abilify 10 mg daily for 2 weeks. Discussed potential risks / benefits of Aristides Curry with pt.     Tian Snow MD  Psychiatrist

## 2020-01-06 NOTE — PLAN OF CARE
Problem: Altered Mood, Depressive Behavior:  Goal: Able to verbalize and/or display a decrease in depressive symptoms  Description  Able to verbalize and/or display a decrease in depressive symptoms  1/5/2020 2102 by Roxanna Shaffer LPN  Outcome: Ongoing  Patient is calm and maintains behavioral control. Patient has been medication compliant and verbalizes no concerns with his medications. Patient is out in dayroom and social with select peers. Patient appears depressed and unmotivated, although reports readiness for discharge at this time. Will continue to monitor. Problem: Altered Mood, Depressive Behavior:  Goal: Ability to disclose and discuss suicidal ideas will improve  Description  Ability to disclose and discuss suicidal ideas will improve  1/5/2020 2102 by Roxanna Shaffer LPN  Outcome: Ongoing  Patient currently denies any suicidal thoughts. Patient agrees to seek staff if thoughts arise. 15 minute checks maintained for patient safety. Will continue to monitor and provide support and reassurance as needed.

## 2020-01-06 NOTE — PROGRESS NOTES
Department of Psychiatry  Attending Progress Note    Chief Complaint: Bipolar 1 disorder ([facility] [address]     SUBJECTIVE: The patient was seen today in the day area. This morning he initially refused his oral medications but after visiting with his parents did consent to taking both the oral medication as well as the Abilify Maintena injection. He states that at first he thought that the medication was making him  feel poorly but now states that he thinks the medication is actually helping him feel better. Patient denies any current suicidal or homicidal thoughts. He denies any hallucinations at this time. Insight and judgment remain poor at this time. Does continue to have some grandiosity. Does remain questionable if patient will remain medication compliant upon discharge. Explored his  feelings and concerns. Reassurance and support provided. Charting and medications reviewed. Denies any side effects from medications. There is no identifiable safe alternative other than continued hospitalization. ROS:  Review of Systems completed and no physical complaints. Pertinent psychiatric information as noted in the HPI. OBJECTIVE:     Physical  BP (!) 147/87   Pulse 100   Temp 97.9 °F (36.6 °C) (Oral)   Resp 14   Ht 6' (1.829 m)   Wt 200 lb (90.7 kg)   SpO2 98%   BMI 27.12 kg/m²      Mental Status Evaluation:  Orientation: alertness: alert   Mood:.  euphoric      Affect:  Somewhat elevated      Appearance:  age appropriate and casually dressed   Activity:  Restless & fidgety   Gait/Posture: Normal   Speech:  hyperverbal   Thought Process:  grandiose   Thought Content:  sexually preoccupied   Sensorium:  person, place and situation   Cognition:  grossly intact   Memory: intact   Insight:  Limited, seems to be improving a bit   Judgment: fair   Suicidal Ideations: denies suicidal ideation   Homicidal Ideations: Negative for homicidal ideation      Medication Side Effects: absent       Attention Span attention span

## 2020-01-07 VITALS
HEART RATE: 100 BPM | BODY MASS INDEX: 27.09 KG/M2 | TEMPERATURE: 98 F | WEIGHT: 200 LBS | SYSTOLIC BLOOD PRESSURE: 146 MMHG | HEIGHT: 72 IN | DIASTOLIC BLOOD PRESSURE: 85 MMHG | RESPIRATION RATE: 14 BRPM | OXYGEN SATURATION: 98 %

## 2020-01-07 PROCEDURE — 99238 HOSP IP/OBS DSCHRG MGMT 30/<: CPT | Performed by: NURSE PRACTITIONER

## 2020-01-07 PROCEDURE — 6370000000 HC RX 637 (ALT 250 FOR IP): Performed by: NURSE PRACTITIONER

## 2020-01-07 RX ORDER — ARIPIPRAZOLE 10 MG/1
10 TABLET ORAL DAILY
Qty: 10 TABLET | Refills: 0 | Status: SHIPPED | OUTPATIENT
Start: 2020-01-08 | End: 2020-01-14 | Stop reason: SDUPTHER

## 2020-01-07 RX ORDER — TRAZODONE HYDROCHLORIDE 50 MG/1
50 TABLET ORAL NIGHTLY PRN
Qty: 14 TABLET | Refills: 0 | Status: SHIPPED | OUTPATIENT
Start: 2020-01-07 | End: 2020-01-14 | Stop reason: SDUPTHER

## 2020-01-07 RX ADMIN — ARIPIPRAZOLE 10 MG: 10 TABLET ORAL at 08:21

## 2020-01-07 NOTE — PLAN OF CARE
Problem: Altered Mood, Psychotic Behavior:  Goal: Absence of self-harm  Description  Absence of self-harm  Outcome: Ongoing     Problem: Altered Mood, Depressive Behavior:  Goal: Ability to disclose and discuss suicidal ideas will improve  Description  Ability to disclose and discuss suicidal ideas will improve  1/6/2020 2106 by Gomez Soliman  Outcome: Ongoing   Patient has been absence from self harm. Patient safety has been maintained with every 15 minutes check.  Patient denies suicidal ideation and states he feeling a lot [id number]

## 2020-01-07 NOTE — DISCHARGE SUMMARY
DISCHARGE SUMMARY  Patient ID:  Rebecca Longo  [id number]  51 y.o.  1995    Admit date: 12/30/2019    Discharge date and time: 1/7/2020  1:47 PM     Admitting Physician: Robson Luther MD     Discharge Physician:  LAMONTE Rider CNP    Admission Diagnoses: Bipolar 1 disorder [facility]) [F31.9]    Discharge Diagnoses:   Bipolar 1 disorder [facility])     Patient Active Problem List   Diagnosis Code    Severe manic bipolar I disorder w/psychotic features, mood-congruent ([facility] [address] F31.2    Bipolar 1 disorder ([facility] [address] F31.9        Admission Condition: poor    Discharged Condition: stable    Indication for Admission: threat to self    History of Present Illnes (present tense wording indicates findings from admission exam on 12/30/2019 and are not necessarily indicative of current findings): Hospital Course:   Upon admission, Rebecca Longo was provided a safe secure environment, introduced to unit milieu. Patient participated in groups and individual therapies. Meds were adjusted. After few days of hospital care, patient began to feel improvement. Depression lifted, thoughts to harm self ceased. Sleep improved, appetite was good. On morning rounds 1/7/2020, patient endorsed feeling ready for discharge. Patient denies suicidal or homicidal ideations, denies hallucinations or delusions. Denies SE's from meds. It was decided that pt had achieved maximum benefit from hospital care and can be discharged     Consults:   None    Significant Diagnostic Studies: Routine labs and diagnostics    Treatments: Psychotropic medications, therapy with group, milieu, and 1:1 with nurses, social workers, LEE/CNP, and Attending physician.       Discharge Medications:  Current Discharge Medication List      START taking these medications    Details   traZODone (DESYREL) 50 MG tablet Take 1 tablet by mouth nightly as needed for Sleep  Qty: 14 tablet, Refills: 0      ARIPiprazole (ABILIFY) 10 MG tablet Take 1 tablet by mouth daily  Qty: 10

## 2020-01-07 NOTE — BH NOTE
Patient stated he doesn't smoke anymore. Nurse  continue to reinforce the dangers of long term tobacco use and why tobacco cessation is important to patient.

## 2020-01-07 NOTE — BH NOTE
[facility]  Discharge Note    Pt discharged with followings belongings:   Dentures: None  Vision - Corrective Lenses: Glasses  Hearing Aid: None  Jewelry: None  Body Piercings Removed: N/A  Clothing: Footwear, Pants, Shirt, Socks  Were All Patient Medications Collected?: Not Applicable  Other Valuables: [address] sent home with patient. Valuables retrieved from safe, Security envelope number:  [id number] and returned to patient. Patient education on aftercare instructions: Yes. Information faxed to [facility]. by staff. Patient verbalize understanding of AVS: Patient denied suicidal and homicidal ideations. Patient discharge to home with family VIA car. Patient given all personal belonging.     Status EXAM upon discharge:  Status and Exam  Normal: Yes  Facial Expression: Brightened  Affect: Appropriate  Level of Consciousness: Alert  Mood:Normal: Yes  Mood: Anxious  Motor Activity:Normal: Yes  Motor Activity: Decreased  Interview Behavior: Cooperative  Preception: [place] to Person, Levie Medal to Time, [place] to Place, [place] to Situation  Attention:Normal: No  Attention: Distractible  Thought Processes: Circumstantial  Thought Content:Normal: No  Thought Content: Poverty of Content  Hallucinations: None  Delusions: No  Memory:Normal: Yes  Insight and Judgment: Yes  Insight and Judgment: Poor Insight  Present Suicidal Ideation: No  Present Homicidal Ideation: No      Metabolic Screening:    Lab Results   Component Value Date    LABA1C 5.3 12/31/2019       Lab Results   Component Value Date    CHOL 145 12/31/2019     Lab Results   Component Value Date    TRIG 36 12/31/2019     Lab Results   Component Value Date    HDL 44 12/31/2019     No components found for: LDLCAL  No results found for: Rosie Hurley LPN

## 2020-01-07 NOTE — PLAN OF CARE
[facility] Interdisciplinary Treatment Plan Note     Review Date & Time: 1/7/2020 1321    Admission Type:   Admission Type: Voluntary    Reason for admission:  Reason for Admission: suicidal thoughts, paranoid thinking    Estimated Length of Stay :  5-7 days  Estimated Discharge Date Update: to be determined by physician    PATIENT STRENGTHS:  Patient Strengths:Strengths: Communication, Positive Support  Patient Strengths and Limitations:Limitations: Tendency to isolate self, Unrealistic self-view, General negative or hopeless attitude about future/recovery, Difficulty problem solving/relies on others to help solve problems, Difficult relationships / poor social skills, Inappropriate/potentially harmful leisure interests  Addictive Behavior:Addictive Behavior  In the past 3 months, have you felt or has someone told you that you have a problem with:  : None  Do you have a history of Chemical Use?: No  Do you have a history of Alcohol Use?: No  Do you have a history of Street Drug Abuse?: No  Histroy of Prescripton Drug Abuse?: No  Medical Problems:   Past Medical History:   Diagnosis Date    Severe manic bipolar I disorder w/psychotic features, mood-congruent ([facility] [address]        Risk:  Fall RiskTotal: 53  Heath Scale Heath Scale Score: 22  BVC Total: 0    Change in scores no.  Changes to plan of Care no    Status EXAM:   Status and Exam  Normal: Yes  Facial Expression: Brightened  Affect: Appropriate  Level of Consciousness: Alert  Mood:Normal: Yes  Mood: Anxious  Motor Activity:Normal: Yes  Motor Activity: Decreased  Interview Behavior: Cooperative  Preception: [place] to Person, Chester Mertens to Time, [place] to Place, [place] to Situation  Attention:Normal: No  Attention: Distractible  Thought Processes: Circumstantial  Thought Content:Normal: No  Thought Content: Poverty of Content  Hallucinations: None  Delusions: No  Memory:Normal: Yes  Insight and Judgment: Yes  Insight and Judgment: Poor

## 2020-01-08 NOTE — CARE COORDINATION
Name: Rafa Sellers    : 1995    Discharge Date: 20    Primary Auth/Cert #: [id number]    Discharged to home     Discharge Medications:      Medication List      START taking these medications    * ARIPiprazole 10 MG tablet  Commonly known as:  ABILIFY  Take 1 tablet by mouth daily  Notes to patient:  Help with depression. * ARIPiprazole  MG Srer  Commonly known as:  ABILIFY MAINTENA  Inject 400 mg into the muscle every 30 days  Start taking on:  2020  Notes to patient:  Mood stabilizer. traZODone 50 MG tablet  Commonly known as:  DESYREL  Take 1 tablet by mouth nightly as needed for Sleep  Notes to patient:  Sleep Aid. * This list has 2 medication(s) that are the same as other medications prescribed for you. Read the directions carefully, and ask your doctor or other care provider to review them with you. STOP taking these medications    sertraline 100 MG tablet  Commonly known as:  ZOLOFT  Notes to patient:  Help with depression.            Where to Get Your Medications      These medications were sent to [place] [address], [address] [place] [address]    Phone:  [phone]   · ARIPiprazole 10 MG tablet  · traZODone 50 MG tablet     Information about where to get these medications is not yet available    Ask your nurse or doctor about these medications  · ARIPiprazole  MG Srer      Pharmacy Instructions:      Patient to  at [address] [facility])                  Follow Up Appointment: Chase Kim, [address] [facility] [place] [address] [address] [facility]  [phone]      As needed    [address] [place] [address]  [phone]  [phone]  On 2020  @ 930 AM for hospital discharge follow up     [employer] med[employer] transportation  call 48 business hours ahead to schedule rides to / from appointments - holidays and weekends do not count as business hours [facility] - [phone] /[phone] 7 a.m. - 7 p.m    [facility] [phone] /[phone] 7 a.m. - 7 p.m

## 2020-01-14 ENCOUNTER — OFFICE VISIT (OUTPATIENT)
Dept: FAMILY MEDICINE CLINIC | Age: 25
End: 2020-01-14
Payer: COMMERCIAL

## 2020-01-14 VITALS
WEIGHT: 237.6 LBS | OXYGEN SATURATION: 95 % | RESPIRATION RATE: 16 BRPM | HEART RATE: 85 BPM | DIASTOLIC BLOOD PRESSURE: 87 MMHG | HEIGHT: 72 IN | SYSTOLIC BLOOD PRESSURE: 153 MMHG | BODY MASS INDEX: 32.18 KG/M2

## 2020-01-14 PROCEDURE — G8484 FLU IMMUNIZE NO ADMIN: HCPCS | Performed by: FAMILY MEDICINE

## 2020-01-14 PROCEDURE — G8427 DOCREV CUR MEDS BY ELIG CLIN: HCPCS | Performed by: FAMILY MEDICINE

## 2020-01-14 PROCEDURE — 1111F DSCHRG MED/CURRENT MED MERGE: CPT | Performed by: FAMILY MEDICINE

## 2020-01-14 PROCEDURE — 99204 OFFICE O/P NEW MOD 45 MIN: CPT | Performed by: FAMILY MEDICINE

## 2020-01-14 PROCEDURE — G8417 CALC BMI ABV UP PARAM F/U: HCPCS | Performed by: FAMILY MEDICINE

## 2020-01-14 PROCEDURE — 1036F TOBACCO NON-USER: CPT | Performed by: FAMILY MEDICINE

## 2020-01-14 RX ORDER — NAPROXEN 500 MG/1
500 TABLET ORAL 2 TIMES DAILY WITH MEALS
COMMUNITY
End: 2020-03-23

## 2020-01-14 RX ORDER — ARIPIPRAZOLE 10 MG/1
10 TABLET ORAL DAILY
Qty: 30 TABLET | Refills: 1 | Status: SHIPPED | OUTPATIENT
Start: 2020-01-14 | End: 2020-03-23

## 2020-01-14 RX ORDER — TIZANIDINE 4 MG/1
4 TABLET ORAL 3 TIMES DAILY
COMMUNITY
End: 2020-03-23

## 2020-01-14 RX ORDER — TRAZODONE HYDROCHLORIDE 50 MG/1
50 TABLET ORAL NIGHTLY PRN
Qty: 30 TABLET | Refills: 1 | Status: SHIPPED | OUTPATIENT
Start: 2020-01-14 | End: 2020-03-23

## 2020-01-14 RX ORDER — AMLODIPINE BESYLATE 10 MG/1
10 TABLET ORAL DAILY
Qty: 30 TABLET | Refills: 3 | Status: SHIPPED | OUTPATIENT
Start: 2020-01-14 | End: 2020-09-21 | Stop reason: SDUPTHER

## 2020-01-14 ASSESSMENT — PATIENT HEALTH QUESTIONNAIRE - PHQ9
SUM OF ALL RESPONSES TO PHQ9 QUESTIONS 1 & 2: 0
SUM OF ALL RESPONSES TO PHQ QUESTIONS 1-9: 0
2. FEELING DOWN, DEPRESSED OR HOPELESS: 0
SUM OF ALL RESPONSES TO PHQ QUESTIONS 1-9: 0
1. LITTLE INTEREST OR PLEASURE IN DOING THINGS: 0

## 2020-01-14 NOTE — LETTER
[facility]  [address] [place] [address] [address] [facility] [address]  Phone: [phone]  Fax: [phone]    Dagoberto Monet DO        January 14, 2020     Patient: Rosalino Marx   YOB: 1995   Date of Visit: 1/14/2020       To Whom it May Concern:    Rosalino Marx was seen in my clinic on 1/14/2020. He may return to work on 1/18/2020. If you have any questions or concerns, please don't hesitate to call.     Sincerely,         Allison Hardin, DO

## 2020-01-14 NOTE — PROGRESS NOTES
[facility]  FAMILY [address] [place] [address] [address]  Dept: [phone]      Sarah Garcia is a 30 y.o. male who presents today for follow up on his  medical conditions as noted below. Chief Complaint   Patient presents with   [address] [facility] [facility] [address] [facility]    Lower Back Pain     x 2 weeks       Patient Active Problem List:     Severe manic bipolar I disorder w/psychotic features, mood-congruent ([facility])     Bipolar 1 disorder ([address]     Past Medical History:   Diagnosis Date    Severe manic bipolar I disorder w/psychotic features, mood-congruent ([address]       Past Surgical History:   Procedure Laterality Date    FRACTURE SURGERY      right hand 2016     Family History   Problem Relation Age of Onset    High Blood Pressure Mother         pre-htn    High Blood Pressure Father         pre-htn       Current Outpatient Medications   Medication Sig Dispense Refill    naproxen (NAPROSYN) 500 MG tablet Take 500 mg by mouth 2 times daily (with meals)      tiZANidine (ZANAFLEX) 4 MG tablet Take 4 mg by mouth 3 times daily      traZODone (DESYREL) 50 MG tablet Take 1 tablet by mouth nightly as needed for Sleep 30 tablet 1    ARIPiprazole (ABILIFY) 10 MG tablet Take 1 tablet by mouth daily 30 tablet 1    amLODIPine (NORVASC) 10 MG tablet Take 1 tablet by mouth daily 30 tablet 3    [START ON 2020] ARIPiprazole ER (ABILIFY MAINTENA) 400 MG SRER Inject 400 mg into the muscle every 30 days 1 each 0     No current facility-administered medications for this visit.       ALLERGIES:  No Known Allergies    Social History     Tobacco Use    Smoking status: Former Smoker     Packs/day: 0.25     Years: 12.00     Pack years: 3.00     Types: Cigarettes     Last attempt to quit: 2019     Years since quittin.0    Smokeless tobacco: Never Used   Substance Use Topics    Alcohol use: Not Currently     Comment: patient reports drinking heavily but states he quit drinking 2 weeks ago -(12/30/19). LDL Cholesterol (mg/dL)   Date Value   12/31/2019 94     HDL (mg/dL)   Date Value   12/31/2019 44     BUN (mg/dL)   Date Value   12/31/2019 10     CREATININE (mg/dL)   Date Value   12/31/2019 0.97     Glucose (mg/dL)   Date Value   12/31/2019 90     Hemoglobin A1C (%)   Date Value   12/31/2019 5.3              Subjective:      HPI  He is here today for 2 reasons 1 he had a psych admission for a acute manic episode and was started on Abilify injectable as well as Abilify oral and trazodone they gave him a short supply he did have an intake meeting at [facility] and does have follow-up appointment scheduled but needs some medication to get him through he is doing much better from a psych standpoint finally  He also developed a thoracic back pain he does not remember specifically what he had done he did go to urgent care and they gave him Zanaflex and Naprosyn 2 days ago it is helping somewhat he also what sounds like a Lidoderm patch he has not scheduled any physical therapy and he has a work study program that he would like to be taken out of  His bp has been high for years   Review of Systems:     Constitutional: Negative for fever, appetite change and fatigue. Family social and medical history reviewed and unchanged     HENT: Negative. Negative for nosebleeds, trouble swallowing and neck pain. Eyes: Negative for photophobia and visual disturbance. Respiratory: Negative. Negative for chest tightness and shortness of breath. Cardiovascular: Negative. Negative for chest pain and leg swelling. Gastrointestinal: Negative. Negative for abdominal pain and blood in stool. Endocrine: Negative for cold intolerance and polyuria. Genitourinary: Negative for dysuria and hematuria. Musculoskeletal: Negative. Skin: Negative for rash. Allergic/Immunologic: Negative. Neurological: Negative. Negative for dizziness, weakness and numbness. Hematological: Negative. Negative for adenopathy. Does not bruise/bleed easily. Psychiatric/Behavioral: Negative for sleep disturbance, dysphoric mood and  decreased concentration. The patient is not nervous/anxious. Objective:     Physical Exam:     Nursing note and vitals reviewed. BP (!) 153/87   Pulse 85   Resp 16   Ht 6' (1.829 m)   Wt 237 lb 9.6 oz (107.8 kg)   SpO2 95%   BMI 32.22 kg/m²   Constitutional: He is oriented to person, place, and time. He   appears well-developed and well-nourished. HENT:   Head: Normocephalic and atraumatic. Right Ear: External ear normal. Tympanic membrane is not erythematous. No middle ear effusion. Left Ear: External ear normal. Tympanic membrane is not erythematous. No middle ear effusion. Nose: No mucosal edema. Mouth/Throat: Oropharynx is clear and moist. No posterior oropharyngeal erythema. Eyes: Conjunctivae and EOM are normal. Pupils are equal, round, and reactive to light. Neck: Normal range of motion. Neck supple. No thyromegaly present. Cardiovascular: Normal rate, regular rhythm and normal heart sounds. No murmur heard. Pulmonary/Chest: Effort normal and breath sounds normal. He has no wheezes. Hehas no rales. Abdominal: Soft. Bowel sounds are normal. He exhibits no distension and no mass. There is no tenderness. There is no rebound and no guarding. Genitourinary/Anorectal:deferred  Musculoskeletal: Normal range of motion. He exhibits no edema or mild  Tenderness. left mid  thor area paraspinal muscles     Lymphadenopathy: He has no cervical adenopathy. Neurological: He is alert and oriented to person, place, and time. He has normal reflexes. Skin: Skin is warm and dry. No rash noted. Psychiatric: He has a normal mood and affect. His   behavior is normal.       Assessment:      1. Thoracic sprain    2. Severe manic bipolar I disorder w/psychotic features, mood-congruent ([address]    3.  Essential hypertension          Plan:      Call or return to clinic prn if these symptoms worsen or fail to improve as anticipated. I have reviewed the instructions with the patient, answering all questions to his satisfaction. No follow-ups on file.   Orders Placed This Encounter   Procedures    [facility]     Referral Priority:   Routine     Referral Type:   Eval and Treat     Referral Reason:   Specialty Services Required     Requested Specialty:   Physical Therapy     Number of Visits Requested:   1     Orders Placed This Encounter   Medications    traZODone (DESYREL) 50 MG tablet     Sig: Take 1 tablet by mouth nightly as needed for Sleep     Dispense:  30 tablet     Refill:  1    ARIPiprazole (ABILIFY) 10 MG tablet     Sig: Take 1 tablet by mouth daily     Dispense:  30 tablet     Refill:  1    amLODIPine (NORVASC) 10 MG tablet     Sig: Take 1 tablet by mouth daily     Dispense:  30 tablet     Refill:  3     Use ice or heat   Fu if not imp  Fu with [employer]  rto in 1 m for a bp check   Electronically signed by Jun Smith DO on 1/14/2020 at 11:26 AM

## 2020-01-15 ENCOUNTER — HOSPITAL ENCOUNTER (OUTPATIENT)
Dept: PHYSICAL THERAPY | Facility: CLINIC | Age: 25
Setting detail: THERAPIES SERIES
Discharge: HOME | End: 2020-01-15
Payer: COMMERCIAL

## 2020-01-15 PROCEDURE — 97110 THERAPEUTIC EXERCISES: CPT

## 2020-01-15 PROCEDURE — 97140 MANUAL THERAPY 1/> REGIONS: CPT

## 2020-01-15 PROCEDURE — 97161 PT EVAL LOW COMPLEX 20 MIN: CPT

## 2020-01-15 NOTE — CONSULTS
Rating: (0-10 scale) 7/10  Pain altered Tx:  [] Yes  [x] No  Action:  Symptoms:  [x] Improving  Better: [x] Other: sleep, working out, stretching  Worse:     [x] Other: changing position  Sleep: [x] OK      Objective:      STRENGTH  STRENGTH  ROM    Left Right  Left Right Cervical    C5 Shld Abd 5 5 L1-2 Hip Flex 5 5 Flexion    Shld Flexion 5 5 Hip Abd 4+ 5 Extension    Shld IR   L3-4 Knee Ext 5 5 Rotation L R   Shld ER   L4 Ankle DF 5 5 Sidebend L R   C6 Elb Flex 5 5 L5 EHL   Retraction    C7 Elb Ext 5 5 S1 Plant. Flex 5 5 Lumbar    C8 EPL   Abdominals 4 4 Flexion WNL   T1 Fing Abd   Erector Spinae   Extension WNL         Rotation L  R         Sidebend L wnl R pain at end range      Hip ext 4+ 5 Thoracic        Knee flex 5 5 Rotation L wnl R wnl         Sidebend L wnl R pull at end range                                          TESTS (+/-) LEFT RIGHT Not Tested   SLR [] sit [] supine - - []   Hamstring (SLR) - - []   SKTC Dec pain Dec pain []   DKTC Dec pain Dec pain []   Slump/Dural - - []   SI JT   []   LUDIN - - []   Joint Mobility No increased pain with PA mobs, normal joint motion  []   Chris Tests ? Pain ?  Pain No Change Not Tested   RFIS [] [x] [] []   CHANDNI [] [x] [] []   RFIL [] [x] [] []   REIL [] [x] [] []   Rep Prot [] [] [] []   Rep Retract [] [] [] []       OBSERVATION No Deficit Deficit Not Tested Comments   Posture       Forward Head [x] [] []    Rounded Shoulders [x] [] []    Kyphosis [x] [] []    Lordosis [x] [] []    Lateral Shift [x] [] []    Scoliosis [x] [] []    Iliac Crest [x] [] []    PSIS [x] [] []    ASIS [x] [] []    Genu Valgus [x] [] []    Genu Varus [x] [] []    Genu Recurvatum [x] [] []    Pronation [x] [] []    Supination [x] [] []    Leg Length Discrp [x] [] []    Slumped Sitting [] [x] []    Palpation [] [x] [] Increased muscle spasm to L thoracic paraspinals and QL   Sensation [x] [] []    Edema [x] [] []    Neurological [] [] [x]            FUNCTION Normal Difficult Unable Sitting [] [x] []   Standing [] [x] []   Ambulation [] [x] []   Groom/Dress [x] [] []   Lift/Carry [] [x] []   Stairs [x] [] []   Bending [x] [] []   OH reach [] [x] []   Sit to Stand [x] [] []     Comments: Oswestry Low Back Pain Scale: 14/50, 28% impairment  :   Assessment: Pt presents with physical therapy signs and symptoms consistent with left-sided thoracic sprain. Pt with increased pain with prolonged positioning. Pt reports decreased pain levels following manual therapy and stretching at this date. Pt with mild weakness in L hip and core strength. Pt would benefit from skilled physical therapy to increase ROM, strength, functional activity tolerance and to decrease pain. Problems:    [x] ? Back Pain : L-sided mid back, 7/10  [] ? Cervical Pain:    [x] ? ROM: pain at end range with R sided SB     [x] ? Strength: impaired L hip strength and core strength   [x] ? Function: Oswestry = 28% impairment  [] Postural Deviations  [] Gait Deviations  [] Other:      STG: (to be met in 6 treatments)  1. ? Pain: Pt will report left-sided mid back pain to no greater than 2/10 with prolonged positioning and playing basketball. 2. ? ROM: Pt will demonstrate ability to perform lumbar and thoracic AROM side bending without increased pain/pulling at end range in order to increase tolerance to prolonged positioning. 3. ? Strength: Pt will increase L hip strength to 5/5 globally in order to decrease strain placed on back. 4. ? Function: Pt will demonstrate improved tolerance to palpation of paraspinals and QL with minimal muscle spasm evident. 5. Independent with Home Exercise Programs  LTG: (to be met in 12 treatments)  1. Pt will demonstrate ability to maintain neutral pelvic and spine positioning during lifting and functional positions in order to decrease risk of re-injury while performing lifting and sporting activities.   2. Pt will demonstrate improved functional activity tolerance as evident by an improved score progress to dynamic standing exercises and lifting techniques while monitoring form      Evaluation Complexity:  History (Personal factors, comorbidities) [] 0 [x] 1-2 [] 3+   Exam (limitations, restrictions) [x] 1-2 [] 3 [] 4+   Clinical presentation (progression) [x] Stable [] Evolving  [] Unstable   Decision Making [x] Low [] Moderate [] High    [x] Low Complexity [] Moderate Complexity [] High Complexity       Treatment Charges: Mins Units   [x] Evaluation       [x]  Low       []  Moderate       []  High 30 1   []  Modalities     [x]  Ther Exercise 12 1   [x]  Manual Therapy 10 1   []  Ther Activities     []  Aquatics     []  Vasocompression     []  Other       TOTAL TREATMENT TIME: 52    Time in: 7:50 am      Time out: 8:45 am    Electronically signed by: Lizabeth Arambula PT        Physician Signature:________________________________Date:__________________  By signing above or cosigning this note, I have reviewed this plan of care and certify a need for medically necessary rehabilitation services.      *PLEASE SIGN ABOVE AND FAX BACK ALL PAGES*

## 2020-01-17 ENCOUNTER — HOSPITAL ENCOUNTER (OUTPATIENT)
Dept: PHYSICAL THERAPY | Facility: CLINIC | Age: 25
Setting detail: THERAPIES SERIES
Discharge: HOME | End: 2020-01-17
Payer: COMMERCIAL

## 2020-01-17 PROCEDURE — 97110 THERAPEUTIC EXERCISES: CPT

## 2020-01-17 PROCEDURE — 97140 MANUAL THERAPY 1/> REGIONS: CPT

## 2020-01-17 NOTE — FLOWSHEET NOTE
[] [address]  [address]  [phone]  F: [phone] [] [address]  [facility]   [address]  P: [phone]  F: [phone] [] [place]  [address] [facility]  P: [phone]  F: [phone] [] [address]  [facility]   [address]   [place]: [phone]  F: [phone]      Physical Therapy Daily Treatment Note    Date:  2020  Patient Name:  Joon Heck    :  1995  MRN: [id number]  Physician: Dr. Wiley Salazar,                              Insurance: [employer]. Vs  Medical Diagnosis: S23. 9XXA - Thoracic sprain                 Rehab Codes: M54.6, M62.81  Onset Date: 2020             Next 's appt. :   Visit# / total visits: 2/12 Cancels/No Shows: 0/0    Subjective:    Pain:  [x] Yes  [] No Location: L-sided mid back  Pain Rating: (0-10 scale) 5/10  Pain altered Tx:  [x] No  [] Yes  Action:  Comments: Pt arrives stating he is feeling better than previously when coming into the clinic for Evaluation. State pain continues to be constant and throbbing however reports it is tolerable now.      Objective:  Modalities:   Precautions:  Exercises:  Exercise Reps/ Time Weight/ Level Comments   UBE 2\"/2\"           Manual therapy 8 min    Instrument assisted soft tissue mobilization (IASTM) to L thoracic paraspinals and quadratus lumborum             Open book stretch 10x5\" ea  red Tball     LTR 10x10\" ea       L seated (aston crossed legs) QL stretch 3x30\"                        Tband      rows 20x Plum    extension 20x Dushore    ER 20x Dushore          Paloff press 15x 3 pl    3 way hip 20x grey    Dead lifts 10x   10x  10x [employer] - form  30# ea  15# ea    Planks 3x20\"           Other:     Specific Instructions for next treatment: hip and core strengthening exercises, progress to dynamic standing exercises and lifting techniques while monitoring form        Treatment Charges: Mins Units   []  Modalities     []  Ther Exercise 35 2   []  Manual Therapy 8 1   []  Ther Activities     []  Aquatics     []  Vasocompression     []  Other     Total Treatment time 46 3       Assessment: [x] Progressing toward goals. Pt with overall good tolerance to treatment. Time spent on educating patient on importance of form of deadlift. Pt did however reports a pulling feeling when completing deadlifts therefore decreased weight used. Ended with manual to QL and paraspinals to decreased muscle tightness. Will continue to progress as able in future visits     [] No change. [] Other:    STG: (to be met in 6 treatments)  1. ? Pain: Pt will report left-sided mid back pain to no greater than 2/10 with prolonged positioning and playing basketball. 2. ? ROM: Pt will demonstrate ability to perform lumbar and thoracic AROM side bending without increased pain/pulling at end range in order to increase tolerance to prolonged positioning. 3. ? Strength: Pt will increase L hip strength to 5/5 globally in order to decrease strain placed on back. 4. ? Function: Pt will demonstrate improved tolerance to palpation of paraspinals and QL with minimal muscle spasm evident. 5. Independent with Home Exercise Programs  LTG: (to be met in 12 treatments)  1. Pt will demonstrate ability to maintain neutral pelvic and spine positioning during lifting and functional positions in order to decrease risk of re-injury while performing lifting and sporting activities. 2. Pt will demonstrate improved functional activity tolerance as evident by an improved score on the Oswestry to less than 15% impairment.        Patient goals: \"to get better + learn\"       Pt.  Education:  [x] Yes  [] No  [x] Reviewed Prior HEP/Ed  Method of Education: [x] Verbal  [] Demo  [] Written  Comprehension of Education:  [x] [employer]

## 2020-01-20 ENCOUNTER — HOSPITAL ENCOUNTER (OUTPATIENT)
Dept: PHYSICAL THERAPY | Facility: CLINIC | Age: 25
Setting detail: THERAPIES SERIES
Discharge: HOME | End: 2020-01-20
Payer: COMMERCIAL

## 2020-01-20 PROCEDURE — 97140 MANUAL THERAPY 1/> REGIONS: CPT

## 2020-01-20 PROCEDURE — 97110 THERAPEUTIC EXERCISES: CPT

## 2020-01-20 NOTE — FLOWSHEET NOTE
standing exercises and lifting techniques while monitoring form, HS stretch, prone scapular strengthening.         Treatment Charges: Mins Units   []  Modalities     []  Ther Exercise 35 2   []  Manual Therapy 8 1   []  Ther Activities     []  Aquatics     []  Vasocompression     []  Other     Total Treatment time 46 3       Assessment: [x] Progressing toward goals. Continues to require cueing to decrease thoracic spine flexion during deadlifts. Instructed patient to beginning deadlift at home with use of shreyas or broom. Denies increased pain through out session. Will continue to progress as able in future sessions. [] No change. [] Other:    STG: (to be met in 6 treatments)  1. ? Pain: Pt will report left-sided mid back pain to no greater than 2/10 with prolonged positioning and playing basketball. 2. ? ROM: Pt will demonstrate ability to perform lumbar and thoracic AROM side bending without increased pain/pulling at end range in order to increase tolerance to prolonged positioning. 3. ? Strength: Pt will increase L hip strength to 5/5 globally in order to decrease strain placed on back. 4. ? Function: Pt will demonstrate improved tolerance to palpation of paraspinals and QL with minimal muscle spasm evident. 5. Independent with Home Exercise Programs  LTG: (to be met in 12 treatments)  1. Pt will demonstrate ability to maintain neutral pelvic and spine positioning during lifting and functional positions in order to decrease risk of re-injury while performing lifting and sporting activities. 2. Pt will demonstrate improved functional activity tolerance as evident by an improved score on the Oswestry to less than 15% impairment.        Patient goals: \"to get better + learn\"       Pt. Education:  [x] Yes  [] No  [x] Reviewed Prior HEP/Ed  Method of Education: [x] Verbal  [] Demo  [] Written  Comprehension of Education:  [x] Verbalizes understanding. [] Demonstrates understanding. [x] Needs review.   [] Demonstrates/verbalizes HEP/Ed previously given. Plan: [x] Continue per plan of care. [] Other: HS Stretch and Prone scap strengthening.        Time In:8:00         Time Out: 855    Electronically signed by:  Kin Jordan PTA

## 2020-01-21 ENCOUNTER — OFFICE VISIT (OUTPATIENT)
Dept: FAMILY MEDICINE CLINIC | Age: 25
End: 2020-01-21
Payer: COMMERCIAL

## 2020-01-21 VITALS
HEIGHT: 72 IN | HEART RATE: 82 BPM | WEIGHT: 242.4 LBS | OXYGEN SATURATION: 98 % | SYSTOLIC BLOOD PRESSURE: 149 MMHG | BODY MASS INDEX: 32.83 KG/M2 | DIASTOLIC BLOOD PRESSURE: 82 MMHG

## 2020-01-21 PROCEDURE — G8417 CALC BMI ABV UP PARAM F/U: HCPCS | Performed by: FAMILY MEDICINE

## 2020-01-21 PROCEDURE — G8427 DOCREV CUR MEDS BY ELIG CLIN: HCPCS | Performed by: FAMILY MEDICINE

## 2020-01-21 PROCEDURE — 99213 OFFICE O/P EST LOW 20 MIN: CPT | Performed by: FAMILY MEDICINE

## 2020-01-21 PROCEDURE — 1036F TOBACCO NON-USER: CPT | Performed by: FAMILY MEDICINE

## 2020-01-21 PROCEDURE — 1111F DSCHRG MED/CURRENT MED MERGE: CPT | Performed by: FAMILY MEDICINE

## 2020-01-21 PROCEDURE — G8484 FLU IMMUNIZE NO ADMIN: HCPCS | Performed by: FAMILY MEDICINE

## 2020-01-21 NOTE — PROGRESS NOTES
is not nervous/anxious. Objective:     Physical Exam:     Nursing note and vitals reviewed. BP (!) 149/82   Pulse 82   Ht 6' (1.829 m)   Wt 242 lb 6.4 oz (110 kg)   SpO2 98%   BMI 32.88 kg/m²   Constitutional: He is oriented to person, place, and time. He   appears well-developed and well-nourished. HENT:   Head: Normocephalic and atraumatic. Right Ear: External ear normal. Tympanic membrane is not erythematous. No middle ear effusion. Left Ear: External ear normal. Tympanic membrane is not erythematous. No middle ear effusion. Nose: No mucosal edema. Mouth/Throat: Oropharynx is clear and moist. No posterior oropharyngeal erythema. Eyes: Conjunctivae and EOM are normal. Pupils are equal, round, and reactive to light. Mostly surrounding the left orbit L area   Neck: Normal range of motion. Neck supple. No thyromegaly present. Cardiovascular: Normal rate, regular rhythm and normal heart sounds. No murmur heard. Pulmonary/Chest: Effort normal and breath sounds normal. He has no wheezes. Hehas no rales. Abdominal: Soft. Bowel sounds are normal. He exhibits no distension and no mass. There is no tenderness. There is no rebound and no guarding. Genitourinary/Anorectal:deferred  Musculoskeletal: Normal range of motion. He exhibits no edema or tenderness. Lymphadenopathy: He has no cervical adenopathy. Neurological: He is alert and oriented to person, place, and time. He has normal reflexes. Skin: Skin is warm and dry. No rash noted. Psychiatric: He has a normal mood and affect. His   behavior is normal.       Assessment:      1. Thoracic sprain          Plan:      Call or return to clinic prn if these symptoms worsen or fail to improve as anticipated. I have reviewed the instructions with the patient, answering all questions to his satisfaction. No follow-ups on file. No orders of the defined types were placed in this encounter.     No orders of the defined types were placed in this encounter.   Discussed  with patient his pain does not preclude him from working and he does have to go to work  Partially if he is able to go and play basketball    Electronically signed by Nadine Yee DO on 1/21/2020 at 4:57 PM

## 2020-01-22 ENCOUNTER — HOSPITAL ENCOUNTER (OUTPATIENT)
Dept: PHYSICAL THERAPY | Facility: CLINIC | Age: 25
Setting detail: THERAPIES SERIES
Discharge: HOME | End: 2020-01-22
Payer: COMMERCIAL

## 2020-01-22 PROCEDURE — 97110 THERAPEUTIC EXERCISES: CPT

## 2020-01-22 PROCEDURE — 97140 MANUAL THERAPY 1/> REGIONS: CPT

## 2020-01-22 NOTE — FLOWSHEET NOTE
Other:     Specific Instructions for next treatment: hip and core strengthening exercises, progress to dynamic standing exercises and lifting techniques while monitoring form        Treatment Charges: Mins Units   []  Modalities     []  Ther Exercise 36 2   []  Manual Therapy 8 1   []  Ther Activities     []  Aquatics     []  Vasocompression     []  Other     Total Treatment time 44 3       Assessment: [x] Progressing toward goals. Added HS stretch and prone scapular squeezes at this date in order to carry over to improved deadlifting form. Pt with improvement noted with deadlift, with decreased rounding of thoracic spine at end range. Pt still challenged with maintaining proper form with single leg RDLs, cues required for proper demonstration. [] No change. [] Other:    STG: (to be met in 6 treatments)  1. ? Pain: Pt will report left-sided mid back pain to no greater than 2/10 with prolonged positioning and playing basketball. 2. ? ROM: Pt will demonstrate ability to perform lumbar and thoracic AROM side bending without increased pain/pulling at end range in order to increase tolerance to prolonged positioning. 3. ? Strength: Pt will increase L hip strength to 5/5 globally in order to decrease strain placed on back. 4. ? Function: Pt will demonstrate improved tolerance to palpation of paraspinals and QL with minimal muscle spasm evident. 5. Independent with Home Exercise Programs  LTG: (to be met in 12 treatments)  1. Pt will demonstrate ability to maintain neutral pelvic and spine positioning during lifting and functional positions in order to decrease risk of re-injury while performing lifting and sporting activities. 2. Pt will demonstrate improved functional activity tolerance as evident by an improved score on the Oswestry to less than 15% impairment.        Patient goals: \"to get better + learn\"       Pt.  Education:  [x] Yes  [] No  [x] Reviewed Prior HEP/Ed  Method of Education: [x] Verbal [] Demo  [] Written  Comprehension of Education:  [x] Verbalizes understanding. [] Demonstrates understanding. [x] Needs review. [] Demonstrates/verbalizes HEP/Ed previously given. Plan: [x] Continue per plan of care. [] Other: HS Stretch and Prone scap strengthening.        Time In: 10:40 am         Time Out: 11:35 am    Electronically signed by:  Lizabeth Arambula PT

## 2020-01-23 ENCOUNTER — OFFICE VISIT (OUTPATIENT)
Dept: FAMILY MEDICINE CLINIC | Age: 25
End: 2020-01-23
Payer: COMMERCIAL

## 2020-01-23 PROCEDURE — G8427 DOCREV CUR MEDS BY ELIG CLIN: HCPCS | Performed by: FAMILY MEDICINE

## 2020-01-23 PROCEDURE — G8484 FLU IMMUNIZE NO ADMIN: HCPCS | Performed by: FAMILY MEDICINE

## 2020-01-23 PROCEDURE — 99213 OFFICE O/P EST LOW 20 MIN: CPT | Performed by: FAMILY MEDICINE

## 2020-01-23 PROCEDURE — G8417 CALC BMI ABV UP PARAM F/U: HCPCS | Performed by: FAMILY MEDICINE

## 2020-01-23 PROCEDURE — 1111F DSCHRG MED/CURRENT MED MERGE: CPT | Performed by: FAMILY MEDICINE

## 2020-01-23 PROCEDURE — 1036F TOBACCO NON-USER: CPT | Performed by: FAMILY MEDICINE

## 2020-01-23 RX ORDER — TIZANIDINE HYDROCHLORIDE 4 MG/1
4 CAPSULE, GELATIN COATED ORAL NIGHTLY PRN
Qty: 30 CAPSULE | Refills: 2 | Status: SHIPPED | OUTPATIENT
Start: 2020-01-23 | End: 2020-03-23

## 2020-01-23 NOTE — PROGRESS NOTES
Does not bruise/bleed easily. Psychiatric/Behavioral: Negative for sleep disturbance, dysphoric mood and  decreased concentration. The patient is not nervous/anxious. Objective:     Physical Exam:     Nursing note and vitals reviewed. There were no vitals taken for this visit. Constitutional: He is oriented to person, place, and time. He   appears well-developed and well-nourished. HENT:   Head: Normocephalic and atraumatic. Right Ear: External ear normal. Tympanic membrane is not erythematous. No middle ear effusion. Left Ear: External ear normal. Tympanic membrane is not erythematous. No middle ear effusion. Nose: No mucosal edema. Mouth/Throat: Oropharynx is clear and moist. No posterior oropharyngeal erythema. Eyes: Conjunctivae and EOM are normal. Pupils are equal, round, and reactive to light. Neck: Normal range of motion. Neck supple. No thyromegaly present. Cardiovascular: Normal rate, regular rhythm and normal heart sounds. No murmur heard. Pulmonary/Chest: Effort normal and breath sounds normal. He has no wheezes. Hehas no rales. Abdominal: Soft. Bowel sounds are normal. He exhibits no distension and no mass. There is no tenderness. There is no rebound and no guarding. Genitourinary/Anorectal:deferred  Musculoskeletal: Normal range of motion. He exhibits no edema or tenderness. Lymphadenopathy: He has no cervical adenopathy. Neurological: He is alert and oriented to person, place, and time. He has normal reflexes. Skin: Skin is warm and dry. No rash noted. Psychiatric: He has a normal mood and affect. His   behavior is normal.       Assessment:      1. Thoracic sprain          Plan:      Call or return to clinic prn if these symptoms worsen or fail to improve as anticipated. I have reviewed the instructions with the patient, answering all questions to his satisfaction. No follow-ups on file.   No orders of the defined types were placed in this encounter. Orders Placed This Encounter   Medications    tiZANidine (ZANAFLEX) 4 MG capsule     Sig: Take 1 capsule by mouth nightly as needed for Muscle spasms     Dispense:  30 capsule     Refill:  2   Discussed with parents and patient that he should not be maxing out in weight or probably basketball he needs to be doing simpler things that are quite a strenuous going to physical therapy doing those home exercises.   And I do absolutely feel that he can go to his work study program he needs to get his muscles back in shape and this will help his back    Electronically signed by Brian Castillo DO on 1/23/2020 at 2:03 PM

## 2020-01-27 ENCOUNTER — HOSPITAL ENCOUNTER (OUTPATIENT)
Dept: PHYSICAL THERAPY | Facility: CLINIC | Age: 25
Setting detail: THERAPIES SERIES
Discharge: HOME | End: 2020-01-27
Payer: COMMERCIAL

## 2020-01-27 PROCEDURE — 97140 MANUAL THERAPY 1/> REGIONS: CPT

## 2020-01-27 PROCEDURE — 97110 THERAPEUTIC EXERCISES: CPT

## 2020-01-27 NOTE — FLOWSHEET NOTE
[] [facility] [address]  [address]  [phone]  F: [phone] [x] [address]  [facility] [address]   [address]  P: [phone]  F: [phone] [] [place]  [address]  P: [phone]  F: [phone] [] [address]  [address]: [phone]  F: [phone]      Physical Therapy Daily Treatment Note    Date:  2020  Patient Name:  Lashawn Sheriff    :  1995  MRN: [id number]  Physician: Dr. Denys Burr DO                             Insurance: [employer] 30 Vs  Medical Diagnosis: S23. 9XXA - Thoracic sprain                 Rehab Codes: M54.6, M62.81  Onset Date: 2020             Next 's appt. :   Visit# / total visits:  Cancels/No Shows: 0/0    Subjective:    Pain:  [x] Yes  [] No Location: L-sided mid back  Pain Rating: (0-10 scale) 4/10  Pain altered Tx:  [x] No  [] Yes  Action:  Comments: patient arrives 3 min late today and spends 7 min in the bathroom prior to session starting. Pt states he can tell he is getting better. Objective:  Modalities:   Precautions:  Exercises:  Exercise Reps/ Time Weight/ Level Comments   Eliptical 10min  Not today due to time constraint.          Manual therapy 8 min    Instrument assisted soft tissue mobilization (IASTM) to L thoracic paraspinals and quadratus lumborum         Prone scapular retraction 15x5\"  Added    Prone T's 10x10\"  Added    Prone Y's 10x5\"  Added                    Open book stretch 10x5\" ea  red Tball     LTR 10x10\" ea       Supine HS stretch 3x30\" ea strap Added    L seated (aston crossed legs) QL stretch 3x30\"                        Tband      rows 20x [place]    extension 20x [place]    ER 20x [place]          Paloff press 20x 3 pl Progressed    3 way hip 20x grey    Dead lifts 10x   10x  10x Cisco - form  15# ea  15# ea    SL RDL 10x ea   Cisco for balance   Planks 3x20\"           Other:     Specific Instructions for next treatment: hip and core strengthening exercises, progress to dynamic standing exercises and lifting techniques while monitoring form         Treatment Charges: Mins Units   []  Modalities     []  Ther Exercise 36 2   []  Manual Therapy 8 1   []  Ther Activities     []  Aquatics     []  Vasocompression     []  Other     Total Treatment time 44 3       Assessment: [x] Progressing toward goals. Again with max cueing required due to poor form. Educated patient that he may lift at the gym however MAX weight and reps is not in his best interest at this time. [] No change. [] Other:    STG: (to be met in 6 treatments)  1. ? Pain: Pt will report left-sided mid back pain to no greater than 2/10 with prolonged positioning and playing basketball. 2. ? ROM: Pt will demonstrate ability to perform lumbar and thoracic AROM side bending without increased pain/pulling at end range in order to increase tolerance to prolonged positioning. 3. ? Strength: Pt will increase L hip strength to 5/5 globally in order to decrease strain placed on back. 4. ? Function: Pt will demonstrate improved tolerance to palpation of paraspinals and QL with minimal muscle spasm evident. 5. Independent with Home Exercise Programs  LTG: (to be met in 12 treatments)  1. Pt will demonstrate ability to maintain neutral pelvic and spine positioning during lifting and functional positions in order to decrease risk of re-injury while performing lifting and sporting activities. 2. Pt will demonstrate improved functional activity tolerance as evident by an improved score on the Oswestry to less than 15% impairment.        Patient goals: \"to get better + learn\"       Pt.  Education:  [x] Yes  [] No  [x] Reviewed Prior HEP/Ed  Method of Education: [x] Verbal  [] Demo  [] Written  Comprehension of

## 2020-01-29 ENCOUNTER — HOSPITAL ENCOUNTER (OUTPATIENT)
Dept: PHYSICAL THERAPY | Facility: CLINIC | Age: 25
Setting detail: THERAPIES SERIES
Discharge: HOME | End: 2020-01-29
Payer: COMMERCIAL

## 2020-01-29 PROCEDURE — 97110 THERAPEUTIC EXERCISES: CPT

## 2020-01-29 NOTE — FLOWSHEET NOTE
1/20   3 way hip 20x grey    Dead lifts 10x   10x  10x Cisco - form  15# ea  15# ea    SL RDL 15x ea   Cisco for balance   Planks 3x20\"           Other:     Specific Instructions for next treatment: hip and core strengthening exercises, progress to dynamic standing exercises and lifting techniques while monitoring form         Treatment Charges: Mins Units   []  Modalities     [x]  Ther Exercise 35 2   []  Manual Therapy     []  Ther Activities     []  Aquatics     []  Vasocompression     []  Other     Total Treatment time 35 2       Assessment: [x] Progressing toward goals. Continued with charted exercises today with no complaints of increased pain. Pt unable to hold a forearm plank for longer than 20 sec. [] No change. [] Other:    STG: (to be met in 6 treatments)  1. ? Pain: Pt will report left-sided mid back pain to no greater than 2/10 with prolonged positioning and playing basketball. 2. ? ROM: Pt will demonstrate ability to perform lumbar and thoracic AROM side bending without increased pain/pulling at end range in order to increase tolerance to prolonged positioning. 3. ? Strength: Pt will increase L hip strength to 5/5 globally in order to decrease strain placed on back. 4. ? Function: Pt will demonstrate improved tolerance to palpation of paraspinals and QL with minimal muscle spasm evident. 5. Independent with Home Exercise Programs  LTG: (to be met in 12 treatments)  1. Pt will demonstrate ability to maintain neutral pelvic and spine positioning during lifting and functional positions in order to decrease risk of re-injury while performing lifting and sporting activities. 2. Pt will demonstrate improved functional activity tolerance as evident by an improved score on the Oswestry to less than 15% impairment.        Patient goals: \"to get better + learn\"       Pt.  Education:  [x] Yes  [] No  [x] Reviewed Prior HEP/Ed  Method of Education: [x] Verbal  [] Demo  [] Written  Comprehension of Education:  [x] Verbalizes understanding. [] Demonstrates understanding. [x] Needs review. [x] Demonstrates/verbalizes HEP/Ed previously given. Plan: [x] Continue per plan of care.    [] Other:      Time In: 805 am         Time Out: 8:50 am    Electronically signed by:  Page Mueller PTA

## 2020-02-03 ENCOUNTER — HOSPITAL ENCOUNTER (OUTPATIENT)
Dept: PHYSICAL THERAPY | Facility: CLINIC | Age: 25
Setting detail: THERAPIES SERIES
Discharge: HOME | End: 2020-02-03
Payer: COMMERCIAL

## 2020-02-03 PROCEDURE — 97110 THERAPEUTIC EXERCISES: CPT

## 2020-02-03 NOTE — FLOWSHEET NOTE
[] [address]  [address]  [phone]  F: [phone] [x] [address]  [facility] 36   [address]  P: [phone]  F: [phone] [] [place]  [address]  P: [phone]  F: [phone] [] [address]  [address]: [phone]  F: [phone]      Physical Therapy Daily Treatment Note    Date:  2/3/2020  Patient Name:  Kirsty Covarrubias    :  1995  MRN: [id number]  Physician: Dr. Bharathi Anders,                              Insurance: [employer] 30 Vs  Medical Diagnosis: S23. 9XXA - Thoracic sprain                 Rehab Codes: M54.6, M62.81  Onset Date: 2020             Next 's appt. :   Visit# / total visits:  Cancels/No Shows: 0/0    Subjective:    Pain:  [x] Yes  [] No Location: L-sided mid back  Pain Rating: (0-10 scale) 3/10  Pain altered Tx:  [x] No  [] Yes  Action:    Comments: patient reports that he is continuing to feel better, decreased pain levels.          Objective:  Modalities:   Precautions:  Exercises:  Exercise Reps/ Time Weight/ Level Comments   Eliptical 8min           Manual therapy 8 min    Not today Instrument assisted soft tissue mobilization (IASTM) to L thoracic paraspinals and quadratus lumborum         Prone scapular retraction 15x5\"  Added    Prone T's 10x10\"  Added    Prone Y's 10x10\"  Added , increased hold 2/3                   Open book stretch 10x5\" ea  red Tball     LTR 10x10\" ea       Supine HS stretch 3x30\" ea strap Added    L seated (aston crossed legs) QL stretch 3x30\"        Sidelying hip abd  10x2   Added 2/3         Tband      rows 20x Plum    extension 20x [place]    ER 20x [place]          Paloff press 20x 3 pl Progressed    3 way hip 20x grey    Dead lifts 10x   10x  10x Cisco - form  15# ea  15# ea    SL RDL 15x ea   Cisco for balance   Planks 3x20\"     TRX Rows 15x  Added 2/3   Other:     Specific Instructions for next treatment: hip and core strengthening exercises, progress to dynamic standing exercises and lifting techniques while monitoring form     Treatment Charges: Mins Units   []  Modalities     [x]  Ther Exercise 42 3   []  Manual Therapy     []  Ther Activities     []  Aquatics     []  Vasocompression     []  Other     Total Treatment time 42 3       Assessment: [x] Progressing toward goals. Progressed exercises as noted above. Pt continues to demonstrate fatigue and require cues when performing deadlifts, specifically single leg with decreased depth evident. Added sidelying hip abduction exercises at this date, due to continued hip weakness. [] No change. [] Other:    STG: (to be met in 6 treatments)  1. ? Pain: Pt will report left-sided mid back pain to no greater than 2/10 with prolonged positioning and playing basketball. - Progressing 2/3/2020  2. ? ROM: Pt will demonstrate ability to perform lumbar and thoracic AROM side bending without increased pain/pulling at end range in order to increase tolerance to prolonged positioning. - MET 2/3/2020  3. ? Strength: Pt will increase L hip strength to 5/5 globally in order to decrease strain placed on back. - Not met 2/3/2020, pt still lacking left hip abduction strength (4+/5)  4. ? Function: Pt will demonstrate improved tolerance to palpation of paraspinals and QL with minimal muscle spasm evident. - MET 2/3/2020  5. Independent with Home Exercise Programs - Progressing 2/3/2020, not performing consistently  LTG: (to be met in 12 treatments)  1. Pt will demonstrate ability to maintain neutral pelvic and spine positioning during lifting and functional positions in order to decrease risk of re-injury while performing lifting and sporting activities.   2. Pt will demonstrate improved functional activity tolerance as

## 2020-02-05 ENCOUNTER — HOSPITAL ENCOUNTER (OUTPATIENT)
Dept: PHYSICAL THERAPY | Facility: CLINIC | Age: 25
Setting detail: THERAPIES SERIES
Discharge: HOME | End: 2020-02-05
Payer: COMMERCIAL

## 2020-02-05 PROCEDURE — 97110 THERAPEUTIC EXERCISES: CPT

## 2020-02-05 NOTE — DISCHARGE SUMMARY
shows/cancels, is discontinued per our policy. [] Pt has completed prescribed number of treatment sessions. [] Other:         Electronically signed by Elsi Johnson PT on 2/5/2020    If you have any questions or concerns, please don't hesitate to call.   Thank you for your referral.

## 2020-02-05 NOTE — FLOWSHEET NOTE
ea  15# ea    SL RDL 15x ea   Cisco for balance   Planks 3x20\"     TRX Rows 15x  Added 2/3   Other:     Specific Instructions for next treatment: hip and core strengthening exercises, progress to dynamic standing exercises and lifting techniques while monitoring form     Treatment Charges: Mins Units   []  Modalities     [x]  Ther Exercise 42 3   []  Manual Therapy     []  Ther Activities     []  Aquatics     []  Vasocompression     []  Other     Total Treatment time 42 3       Assessment: [x] Progressing toward goals. Good tolerance to charted exercises again today. Minimal cuing for technique, slight fatigue noted at the end.     [] No change. [] Other:    STG: (to be met in 6 treatments)  1. ? Pain: Pt will report left-sided mid back pain to no greater than 2/10 with prolonged positioning and playing basketball. - Progressing 2/3/2020  2. ? ROM: Pt will demonstrate ability to perform lumbar and thoracic AROM side bending without increased pain/pulling at end range in order to increase tolerance to prolonged positioning. - MET 2/3/2020  3. ? Strength: Pt will increase L hip strength to 5/5 globally in order to decrease strain placed on back. - Not met 2/3/2020, pt still lacking left hip abduction strength (4+/5)  4. ? Function: Pt will demonstrate improved tolerance to palpation of paraspinals and QL with minimal muscle spasm evident. - MET 2/3/2020  5. Independent with Home Exercise Programs - Progressing 2/3/2020, not performing consistently  LTG: (to be met in 12 treatments)  1. Pt will demonstrate ability to maintain neutral pelvic and spine positioning during lifting and functional positions in order to decrease risk of re-injury while performing lifting and sporting activities. 2. Pt will demonstrate improved functional activity tolerance as evident by an improved score on the Oswestry to less than 15% impairment.        Patient goals: \"to get better + learn\"       Pt.  Education:  [x] Yes  [] No  [x] Reviewed Prior HEP/Ed  Method of Education: [x] Verbal HEP and progression review  [] Demo  [] Written issued T-band for HEP  Comprehension of Education:  [x] Verbalizes understanding. [] Demonstrates understanding. [] Needs review. [x] Demonstrates/verbalizes HEP/Ed previously given. Plan: [] Continue per plan of care.    [x] Other: discharge to HEP      Time In: 8:55 am         Time Out: 9:45 am    Electronically signed by:  Jamie Cota PTA

## 2020-03-02 ENCOUNTER — TELEPHONE (OUTPATIENT)
Dept: PRIMARY CARE CLINIC | Age: 25
End: 2020-03-02

## 2020-03-23 ENCOUNTER — OFFICE VISIT (OUTPATIENT)
Dept: PRIMARY CARE CLINIC | Age: 25
End: 2020-03-23
Payer: COMMERCIAL

## 2020-03-23 VITALS
HEART RATE: 82 BPM | OXYGEN SATURATION: 96 % | SYSTOLIC BLOOD PRESSURE: 136 MMHG | BODY MASS INDEX: 35.92 KG/M2 | DIASTOLIC BLOOD PRESSURE: 84 MMHG | WEIGHT: 265.2 LBS | HEIGHT: 72 IN

## 2020-03-23 PROBLEM — I10 ESSENTIAL HYPERTENSION: Status: ACTIVE | Noted: 2020-03-23

## 2020-03-23 PROCEDURE — G8484 FLU IMMUNIZE NO ADMIN: HCPCS | Performed by: PHYSICIAN ASSISTANT

## 2020-03-23 PROCEDURE — G8417 CALC BMI ABV UP PARAM F/U: HCPCS | Performed by: PHYSICIAN ASSISTANT

## 2020-03-23 PROCEDURE — G8427 DOCREV CUR MEDS BY ELIG CLIN: HCPCS | Performed by: PHYSICIAN ASSISTANT

## 2020-03-23 PROCEDURE — 99204 OFFICE O/P NEW MOD 45 MIN: CPT | Performed by: PHYSICIAN ASSISTANT

## 2020-03-23 PROCEDURE — 4004F PT TOBACCO SCREEN RCVD TLK: CPT | Performed by: PHYSICIAN ASSISTANT

## 2020-03-23 RX ORDER — DICLOFENAC SODIUM 75 MG/1
75 TABLET, DELAYED RELEASE ORAL 2 TIMES DAILY
Qty: 60 TABLET | Refills: 11 | Status: SHIPPED
Start: 2020-03-23 | End: 2020-09-21 | Stop reason: ALTCHOICE

## 2020-03-23 ASSESSMENT — ENCOUNTER SYMPTOMS
CONSTIPATION: 0
TROUBLE SWALLOWING: 0
NAUSEA: 0
ABDOMINAL PAIN: 0
CHEST TIGHTNESS: 0
EYE PAIN: 0
VOICE CHANGE: 0
SHORTNESS OF BREATH: 0
BLOOD IN STOOL: 0
DIARRHEA: 0
COUGH: 0
VOMITING: 0
WHEEZING: 0
BACK PAIN: 1

## 2020-03-23 NOTE — PROGRESS NOTES
SURGERY  2016    right hand 2016       Family History   Problem Relation Age of Onset    High Blood Pressure Mother         pre-htn    High Blood Pressure Father         pre-htn    Breast Cancer Paternal Grandmother     Stroke Paternal Great Grandmother        Social History     Tobacco Use    Smoking status: Current Every Day Smoker     Packs/day: 0.25     Years: 12.00     Pack years: 3.00     Types: Cigarettes     Last attempt to quit: 2019     Years since quittin.2    Smokeless tobacco: Never Used   Substance Use Topics    Alcohol use: Not Currently     Comment: patient reports drinking heavily but states he quit drinking 2 weeks ago -(19). Current Outpatient Medications   Medication Sig Dispense Refill    diclofenac (VOLTAREN) 75 MG EC tablet Take 1 tablet by mouth 2 times daily 60 tablet 11    amLODIPine (NORVASC) 10 MG tablet Take 1 tablet by mouth daily 30 tablet 3    ARIPiprazole ER (ABILIFY MAINTENA) 400 MG SRER Inject 400 mg into the muscle every 30 days 1 each 0     No current facility-administered medications for this visit. No Known Allergies    Health Maintenance   Topic Date Due    Pneumococcal 0-64 years Vaccine (1 of 1 - PPSV23) 10/04/1999    HPV vaccine (1 - Male 2-dose series) 10/04/2004    HIV screen  10/04/2008    DTaP/Tdap/Td vaccine (6 - Td) 2016    Flu vaccine (1) 2021 (Originally 2019)    Potassium monitoring  2020    Creatinine monitoring  2020    Shingles Vaccine (1 of 2) 10/04/2043    Hepatitis A vaccine  Completed    Hepatitis B vaccine  Completed    Hib vaccine  Completed    Meningococcal (ACWY) vaccine  Aged Out    Varicella vaccine  Discontinued       Subjective:     Review of Systems   Constitutional: Negative for activity change, appetite change, chills, fatigue, fever and unexpected weight change. HENT: Negative for dental problem, hearing loss, trouble swallowing and voice change.     Eyes: Negative for Breath sounds: Normal breath sounds. No wheezing or rales. Chest:      Chest wall: No tenderness. Abdominal:      General: Bowel sounds are normal. There is no distension. Palpations: Abdomen is soft. There is no mass. Tenderness: There is no abdominal tenderness. There is no guarding or rebound. Hernia: No hernia is present. Musculoskeletal: Normal range of motion. Skin:     General: Skin is warm. Capillary Refill: Capillary refill takes less than 2 seconds. Findings: No rash. Neurological:      Mental Status: He is alert and oriented to person, place, and time. Motor: No abnormal muscle tone. Coordination: Coordination normal.      Deep Tendon Reflexes: Reflexes normal.         Assessment:       Diagnosis Orders   1. Chronic left-sided low back pain without sciatica  XR LUMBAR SPINE (MIN 4 VIEWS)   2. Essential hypertension          Plan:    Reviewed 12/31/19   Sign MRR from [facility] for STD screening. Trial Diclofenac  Use ThermaCare heat patches, May use Salon Pas (lidocaine), if desires  Continue PT exercises and discussed proper posture. No need for refill on BP med at this time. Return in about 6 months (around 9/23/2020) for HTN. Orders Placed This Encounter   Procedures    XR LUMBAR SPINE (MIN 4 VIEWS)     Standing Status:   Future     Standing Expiration Date:   3/23/2021     Order Specific Question:   Reason for exam:     Answer:   pain     Orders Placed This Encounter   Medications    diclofenac (VOLTAREN) 75 MG EC tablet     Sig: Take 1 tablet by mouth 2 times daily     Dispense:  60 tablet     Refill:  11       Patient given educationalmaterials - see patient instructions. Discussed use, benefit, and side effectsof prescribed medications. All patient questions answered. Pt voiced understanding. Reviewed health maintenance. Instructed to continue current medications, diet andexercise. Patient agreed with treatment plan.  Follow up as directed.      Electronicallysigned by Benigno Hutton PA-C on 3/23/2020 at 11:32 AM

## 2020-03-27 ENCOUNTER — HOSPITAL ENCOUNTER (OUTPATIENT)
Dept: GENERAL RADIOLOGY | Age: 25
Discharge: HOME | End: 2020-03-29
Payer: COMMERCIAL

## 2020-03-27 ENCOUNTER — HOSPITAL ENCOUNTER (OUTPATIENT)
Age: 25
Discharge: HOME | End: 2020-03-29
Payer: COMMERCIAL

## 2020-03-27 PROCEDURE — 72110 X-RAY EXAM L-2 SPINE 4/>VWS: CPT

## 2020-04-07 ENCOUNTER — HOSPITAL ENCOUNTER (OUTPATIENT)
Dept: MRI IMAGING | Age: 25
Discharge: HOME | End: 2020-04-09
Payer: COMMERCIAL

## 2020-04-07 PROCEDURE — 72148 MRI LUMBAR SPINE W/O DYE: CPT

## 2020-09-21 ENCOUNTER — OFFICE VISIT (OUTPATIENT)
Dept: PRIMARY CARE CLINIC | Age: 25
End: 2020-09-21
Payer: COMMERCIAL

## 2020-09-21 VITALS
OXYGEN SATURATION: 97 % | BODY MASS INDEX: 36.57 KG/M2 | SYSTOLIC BLOOD PRESSURE: 128 MMHG | DIASTOLIC BLOOD PRESSURE: 82 MMHG | HEART RATE: 91 BPM | HEIGHT: 72 IN | TEMPERATURE: 97.2 F | WEIGHT: 270 LBS

## 2020-09-21 PROCEDURE — G8417 CALC BMI ABV UP PARAM F/U: HCPCS | Performed by: PHYSICIAN ASSISTANT

## 2020-09-21 PROCEDURE — G8427 DOCREV CUR MEDS BY ELIG CLIN: HCPCS | Performed by: PHYSICIAN ASSISTANT

## 2020-09-21 PROCEDURE — 4004F PT TOBACCO SCREEN RCVD TLK: CPT | Performed by: PHYSICIAN ASSISTANT

## 2020-09-21 PROCEDURE — 99213 OFFICE O/P EST LOW 20 MIN: CPT | Performed by: PHYSICIAN ASSISTANT

## 2020-09-21 RX ORDER — AMLODIPINE BESYLATE 10 MG/1
10 TABLET ORAL DAILY
Qty: 90 TABLET | Refills: 1 | Status: SHIPPED | OUTPATIENT
Start: 2020-09-21 | End: 2021-08-30 | Stop reason: SDUPTHER

## 2020-09-21 ASSESSMENT — ENCOUNTER SYMPTOMS
COUGH: 0
COLOR CHANGE: 0
APNEA: 0
CHEST TIGHTNESS: 0
BACK PAIN: 1
SHORTNESS OF BREATH: 0
ABDOMINAL PAIN: 0

## 2020-09-21 NOTE — PROGRESS NOTES
[address] [facility] PRIMARY CARE  [address] [facility] [address]  Dept: Corey Hilliard is a 30 y.o. male who presents today for his medical conditions/complaintsas noted below. Chief Complaint   Patient presents with    Hypertension     Pt taking Amlodipine. No concerns.  Back Pain     pt c/o lower back pain, pt said he has had this pain since 2020. Patient would like to do PT.       HPI:     HPI  Pain level---constant discomfort 5/10 and Diclofenac not helping. NO N/T into legs, no loss of bowel or bladder control. Did gain 5 pounds since March and close to 30 pounds this year. NO regular  exercise. BP have been running well. No CP, SOB, dizziness, syncope.     LDL Cholesterol (mg/dL)   Date Value   2019 94       (goal LDL is <100)   AST (U/L)   Date Value   2019 23     ALT (U/L)   Date Value   2019 33     BUN (mg/dL)   Date Value   2019 10     BP Readings from Last 3 Encounters:   20 128/82   20 136/84   20 (!) 149/82          (goal 120/80)    Past Medical History:   Diagnosis Date    Essential hypertension 3/23/2020    Severe manic bipolar I disorder w/psychotic features, mood-congruent [facility])       Past Surgical History:   Procedure Laterality Date    FRACTURE SURGERY  2016    right hand 2016       Family History   Problem Relation Age of Onset    High Blood Pressure Mother         pre-htn    High Blood Pressure Father         pre-htn    Breast Cancer Paternal Grandmother     Stroke Paternal Great Grandmother        Social History     Tobacco Use    Smoking status: Current Every Day Smoker     Packs/day: 0.25     Years: 12.00     Pack years: 3.00     Types: Cigarettes     Last attempt to quit: 2019     Years since quittin.7    Smokeless tobacco: Never Used   Substance Use Topics    Alcohol use: Not Currently     Comment: patient reports drinking heavily but states he quit drinking 2 weeks ago -(12/30/19). Current Outpatient Medications   Medication Sig Dispense Refill    amLODIPine (NORVASC) 10 MG tablet Take 1 tablet by mouth daily 90 tablet 1    diclofenac (FLECTOR) 1.3 % patch Place 1 patch onto the skin 2 times daily 60 patch 2    ARIPiprazole ER (ABILIFY MAINTENA) 400 MG SRER Inject 400 mg into the muscle every 30 days 1 each 0     No current facility-administered medications for this visit. No Known Allergies    Health Maintenance   Topic Date Due    Pneumococcal 0-64 years Vaccine (1 of 1 - PPSV23) 10/04/1999    HPV vaccine (1 - Male 2-dose series) 10/04/2004    HIV screen  10/04/2008    DTaP/Tdap/Td vaccine (6 - Td) 09/21/2021 (Originally 11/30/2016)    Flu vaccine (1) 09/21/2021 (Originally 9/1/2020)    Potassium monitoring  12/31/2020    Creatinine monitoring  12/31/2020    Hepatitis A vaccine  Completed    Hepatitis B vaccine  Completed    Hib vaccine  Completed    Meningococcal (ACWY) vaccine  Aged Out    Varicella vaccine  Discontinued       Subjective:      Review of Systems   Constitutional: Negative for fatigue. Respiratory: Negative for apnea, cough, chest tightness and shortness of breath. Cardiovascular: Negative for chest pain, palpitations and leg swelling. Gastrointestinal: Negative for abdominal pain. Musculoskeletal: Positive for back pain. Negative for arthralgias. Skin: Negative for color change. Neurological: Negative for dizziness, syncope, weakness, light-headedness and headaches. Psychiatric/Behavioral: Negative for sleep disturbance. The patient is not nervous/anxious. Objective:     /82   Pulse 91   Temp 97.2 °F (36.2 °C)   Ht 6' (1.829 m)   Wt 270 lb (122.5 kg)   SpO2 97%   BMI 36.62 kg/m²   Physical Exam  Vitals signs reviewed. Neck:      Musculoskeletal: No edema. Vascular: No carotid bruit. Cardiovascular:      Rate and Rhythm: Normal rate and regular rhythm.       Heart sounds: Pt voiced understanding. Reviewed health maintenance. Instructed to continue current medications, diet andexercise. Patient agreed with treatment plan. Follow up as directed.      Electronicallysigned by Jason Cifuentes PA-C on 9/21/2020 at 1:23 PM

## 2020-10-26 ENCOUNTER — INITIAL CONSULT (OUTPATIENT)
Dept: PHYSICAL MEDICINE AND REHAB | Age: 25
End: 2020-10-26
Payer: COMMERCIAL

## 2020-10-26 VITALS
HEIGHT: 72 IN | SYSTOLIC BLOOD PRESSURE: 131 MMHG | TEMPERATURE: 98.4 F | HEART RATE: 91 BPM | WEIGHT: 272 LBS | DIASTOLIC BLOOD PRESSURE: 84 MMHG | BODY MASS INDEX: 36.84 KG/M2

## 2020-10-26 PROCEDURE — 99213 OFFICE O/P EST LOW 20 MIN: CPT | Performed by: STUDENT IN AN ORGANIZED HEALTH CARE EDUCATION/TRAINING PROGRAM

## 2020-10-26 NOTE — PROGRESS NOTES
[facility]  [facility] PHYSICAL MEDICINE AND REHABILITATION  [address] [address]  Dept: [phone]  Dept Fax: [phone]    New Patient Note    Carmela Warren is a 30y.o.-year-old male presenting with Back Pain (Low Back Pain per Mehul CONTE)  . HPI:     Mr. Tim Busch reports midline low back pain, which has been present for almost 1 year. He describes it as a constant low-level discomfort. He denies any inciting injury. He thinks that it has gotten better over time but has not gone away. He denies any radiation of pain to the bilateral lower limbs as well as denies any numbness/tingling or weakness of the bilateral lower limbs. He denies any changes in bladder or bowel control. When the pain was worse, he tried heat, bengay, and an NSAID, which all helped some. He also participated in physical therapy in January and February 2020, which helped as well. He is not currently using any medications for pain. He does exercise at the gym about once per week. He focuses on lifting, mostly for strengthening of the limbs. He states that he has not been doing much exercise for core strength.     Past Medical History:   Diagnosis Date    Essential hypertension 3/23/2020    Severe manic bipolar I disorder w/psychotic features, mood-congruent [facility])       Past Surgical History:   Procedure Laterality Date    FRACTURE SURGERY  2016    right hand 2016     Family History   Problem Relation Age of Onset    High Blood Pressure Mother         pre-htn    High Blood Pressure Father         pre-htn    Breast Cancer Paternal Grandmother     Stroke Paternal Great Grandmother      Social History     Socioeconomic History    Marital status: Single     Spouse name: None    Number of children: None    Years of education: None    Highest education level: None   Occupational History    None   Social Needs    Financial resource strain: None    Food insecurity     Worry: None Inability: None    Transportation needs     Medical: None     Non-medical: None   Tobacco Use    Smoking status: Current Every Day Smoker     Packs/day: 0.25     Years: 12.00     Pack years: 3.00     Types: Cigarettes     Last attempt to quit: 2019     Years since quittin.8    Smokeless tobacco: Never Used   Substance and Sexual Activity    Alcohol use: Not Currently     Comment: patient reports drinking heavily but states he quit drinking 2 weeks ago -(19).  Drug use: Not Currently     Types: Marijuana     Comment: Quit smoking Marijuana two weeks ago - 19    Sexual activity: Not Currently   Lifestyle    Physical activity     Days per week: None     Minutes per session: None    Stress: None   Relationships    Social connections     Talks on phone: None     Gets together: None     Attends Shinto service: None     Active member of club or organization: None     Attends meetings of clubs or organizations: None     Relationship status: None    Intimate partner violence     Fear of current or ex partner: None     Emotionally abused: None     Physically abused: None     Forced sexual activity: None   Other Topics Concern    None   Social History Narrative    None       No Known Allergies    Current Outpatient Medications   Medication Sig Dispense Refill    amLODIPine (NORVASC) 10 MG tablet Take 1 tablet by mouth daily 90 tablet 1    diclofenac (FLECTOR) 1.3 % patch Place 1 patch onto the skin 2 times daily 60 patch 2    ARIPiprazole ER (ABILIFY MAINTENA) 400 MG SRER Inject 400 mg into the muscle every 30 days 1 each 0     No current facility-administered medications for this visit. Subjective:      Review of Systems   Constitutional: Negative for activity change. Gastrointestinal: Negative for constipation. No bowel incontinence   Genitourinary: Negative for difficulty urinating. No urinary incontinence   Musculoskeletal: Positive for back pain. Neurological: Negative for weakness and numbness. Objective:     Physical Exam  /84   Pulse 91   Temp 98.4 °F (36.9 °C) (Temporal)   Ht 6' (1.829 m)   Wt 272 lb (123.4 kg)   BMI 36.89 kg/m²     Constitutional: He appears well-developed and well-nourished. In no distress. HEENT: NCAT, EOMI. Pulmonary/Chest: Respirations WNL and unlabored. MSK: No tenderness to palpation of the lumbar spine or bilateral paraspinal musculature. Normal ROM of the lumbar spine. Functional ROM in all limbs. Strength 5/5 throughout bilateral lower limbs. Neurological: He is alert and oriented to person, place, and time. Sensation to light touch intact in bilateral lower limbs. No clonus with passive ankle dorsiflexion bilaterally. Normal gait. Able to walk on heels and toes. Skin: Skin is warm and dry with good turgor. Psychiatric: He has a normal mood and affect. His behavior is normal. Thought content normal.    Nursing note and vitals reviewed. Imaging  XR lumbar spine, 3/27/2020: Impression    1. 3 mm retrolisthesis of L5 on S1.  Straightening of the lumbar lordosis. 2. No obvious pars defects on oblique imaging. 3. Moderate stool burden. 4. No acute vertebral body height loss within the lumbar spine. MRI lumbar spine, 4/7/2020: Impression    Congenitally shortened pedicles with mild degenerative facet hypertrophy    resulting in mild bilateral foraminal narrowing at L3-4 and L4-5. Assessment:       Diagnosis Orders   1.  Chronic midline low back pain without sciatica  [facility] Physical Therapy - [facility]          Plan:        - Placed referral for physical therapy for 1-2 visits to establish a home exercise program focusing on stretching, core strengthening, and postural training  - May continue using heat or ice, OTC topical medication, and OTC tylenol or NSAID as needed for pain  - Follow up as needed    Orders Placed This Encounter   Procedures   [facility] Physical Therapy - [facility]     Referral Priority:   Routine     Referral Type:   Eval and Treat     Referral Reason:   Specialty Services Required     Requested Specialty:   Physical Therapy     Number of Visits Requested:   1         Electronically signed by Mona Stinson MD

## 2020-10-28 ASSESSMENT — ENCOUNTER SYMPTOMS
BACK PAIN: 1
CONSTIPATION: 0

## 2020-11-03 ENCOUNTER — HOSPITAL ENCOUNTER (OUTPATIENT)
Dept: PHYSICAL THERAPY | Facility: CLINIC | Age: 25
Setting detail: THERAPIES SERIES
Discharge: HOME | End: 2020-11-03
Payer: COMMERCIAL

## 2020-11-03 PROCEDURE — 97110 THERAPEUTIC EXERCISES: CPT

## 2020-11-03 PROCEDURE — 97161 PT EVAL LOW COMPLEX 20 MIN: CPT

## 2020-11-03 NOTE — CONSULTS
[] [facility]        Outpatient Physical                Therapy       [address]       Phone: [phone]       Fax: [phone] [x] [facility]       Promotion at [address] [facility]       Phone: [phone]       Fax: [phone] [] [facility]    [address]     Phone: [phone]     Fax:  [phone]     Physical Therapy Spine Evaluation    Date:  11/3/2020  Patient: Chad Argueta  : 1995  MRN: [id number]  Physician: Keira Santiago MD   Insurance: [facility]  Medical Diagnosis: M54.5, G89.29 (ICD-10-CM) - Chronic midline low back pain without sciatica   Rehab Codes: M54.5, M62.81  Onset Date: 2019  Next 's appt.: TBD    Subjective:   CC: low back pain  HPI: (onset date): Pt is a 30y.o. year old male with c/o low back pain that began at the end of 2019. Pt reports he initially did therapy for pain in his upper back which has resolved and his low back pain is also improving. Pt reports he would like to review the exercises that were completed earlier this year to continue to help his low back pain. Pt states his low back pain is constant and remains at a 4/10. Pt denies any radicular symptoms or leg weakness secondary to back pain. Pt denies sleep disturbances due to pain. Pt states the only thing that makes his pain worse is sitting. Pt states he can do everything he needs to do and his pain does not limit him. Pt notes he finds ibuprofen helpful for his back pain but has not taken it recently. Pt also notes he uses heat patches that also help with his pain. Pt states he goes to the gym 1x/week but would like to try to increase the frequency. Pt notes he does not play rugby anymore due to his back pain. Pt states he also is not currently playing basketball at the gym due to [place]. Pt reports the only sx he has had is on his hand in 2017.  Pt reports he will be starting to work soon and feels this may increase his back pain. Comorbidities:   [] Obesity [] Dialysis  [x] Other: HTN   [] Asthma/COPD [] Dementia [x] Other:  OA   [] Stroke [] Sleep apnea [] Other:   [] Vascular disease [] Rheumatic disease [] Other:     Tests: [x] X-Ray: [x] MRI:  [] Other:  Narrative    EXAMINATION:    MRI OF THE LUMBAR SPINE WITHOUT CONTRAST, 4/7/2020 12:53 pm         TECHNIQUE:    Multiplanar multisequence MRI of the lumbar spine was performed without the    administration of intravenous contrast.         COMPARISON:    None.         HISTORY:    ORDERING SYSTEM PROVIDED HISTORY: Chronic bilateral low back pain without    sciatica    TECHNOLOGIST PROVIDED HISTORY:    see above    Reason for Exam: chronic low back pain for 4 months    Acuity: Chronic    Type of Exam: Subsequent/Follow-up    Additional signs and symptoms: no known injury    Relevant Medical/Surgical History: no leg pain         FINDINGS:    BONES/ALIGNMENT: The vertebral body heights are maintained.  There are    congenitally short pedicles.  There is age-appropriate bone marrow signal.    The disc spaces are maintained.  There is no disc space narrowing.  There is    no spondylolisthesis.         SPINAL CORD: Conus medullaris is normal in caliber and signal and terminates    at the L1 level.  The cauda equina is unremarkable.         SOFT TISSUES: The posterior paraspinal soft tissues are unremarkable.  The    visualized abdominal structures are unremarkable.         L1-L2: There is no disc bulge or herniation.  There is no canal stenosis or    foraminal narrowing.         L2-L3: There is no disc bulge or herniation.  There is mild facet    hypertrophy.  There is no canal stenosis or foraminal narrowing.         L3-L4: There is no disc bulge or herniation.  There is mild facet    hypertrophy.  There is no canal stenosis.  There is mild bilateral foraminal    narrowing.         L4-L5: There is no disc bulge or herniation. [] Only able to elicit minimal reflex on R patellar tendon   Gait x   Unremarkable gait   - able to heel and toe walk   Balance  x  L: 33\"  R: 60\"        STRENGTH  ROM    Left Right Lumbar    L1-2 Hip Flex 5  5 5  5 Flexion To toes   Hip Abd 5 5 Extension WFL- dev to R   L3-4 Knee Ext 5 5 Rotation L WFL R WFL   L4 Ankle DF WFL WFL Sidebend L WFL R WFL   S1 Plant. Flex [facility] WFL     Abdominals 5 5     Erector Spinae       Hip Ext 5KE  4+KF 4+KE  4KF      Hip IR 5 5      Hip ER 5 5      HS 4+ 5-                                      Squat LLE shift and anterior trunk lean  TA activation- GOOD  Lower abdominal strength: WFL- minimal lordotic posturing with leg lowering   PLANK: 28\"- lordotic posturing at end    TESTS (+/-) LEFT RIGHT Not Tested   SLR [] sit [x] supine - - []   Hamstring (90/90) 28 40 []   SKTC - - []   DKTC - - []   Slump/Dural - - []   FADIR - - []   LUDIN - - []   Quad tightness + + []       FUNCTION Normal Difficult Unable   Sitting [] [x] []   Standing [x] [] []   Ambulation [x] [] []   Groom/Dress [x] [] []   Lift/Carry [] [x] []   Stairs [x] [] []   Bending [x] [] []   OH reach [x] [] []   Sit to Stand [x] [] []     Outcome Measure: Oswestry: 9/50, 18% impairment    Assessment: Marino Sanchez is a 30y.o. year old male with c/o low back pain that began at the end of December 2019. Pt reports his low back pain is improving and would like to review exercises that were previously completed. Pt does present with decreased hamstring flexibility and mild posterior chain/postural weakness. Pt will benefit from therapeutic interventions including therapeutic exercise in order to promote safe and independent completion of exercises at the gym. Problems:    [x] ? Back Pain: 4/10     [x] ? ROM: dec flexibility hamstrings    [x] ? Strength: mild core strength deficits   [x] ?  Function: Oswestry: 9/50, 18% impairment   STG: (to be met in 6 treatments)  1. ? Pain: Pt will report less than or equal to 2-3/10 low back pain with sitting, standing, walking, and lifting in order to complete tasks efficiently. 2. ? ROM: Pt will demonstrate improved hamstring flexibility to 30\" on the RLE in order to improve muscle-length tension relationships and improve tolerance to standing and walking. 3. ? Strength: Pt will demosntrate 5/5 BLE strength and ability to complete a prone plank for 40\" without presence of lumbar lordosis to improve postural muscle strength and improve tolerance to sitting, standing, and walking. 4. ? Strength: Pt will be able to complete 10 squats without anterior trunk lean and symmetrical weight shift to promote optimal squatting technique and promote safety at the gym. 5. ? Function: Pt will score less than or equal to 8% on the Oswestry to demonstrate improvements in function. 6.  Independent with Home Exercise Programs    Patient goals: re-learn exercises and techniques     Rehab Potential:  [x] Good  [] Fair  [] Poor   Suggested Professional Referral:  [x] No  [] Yes:  Barriers to Goal Achievement[de-identified]  [x] No  [] Yes:  Domestic Concerns:  [x] No  [] Yes:    Pt. Education:  [x] Plans/Goals, Risks/Benefits discussed  [x] Home exercise program  Method of Education: [x] Verbal  [x] Demo  [x] Written  Comprehension of Education:  [x] Verbalizes understanding. [x] Demonstrates understanding. [x] Needs Review. [] Demonstrates/verbalizes understanding of HEP/Ed previously given.     Treatment Plan:  [x] Therapeutic Exercise    [] Modalities:  [] Therapeutic Activity    [] Ultrasound  [] Electrical Stimulation  [] Gait Training     []Massage       [] Lumbar/Cervical Traction  [x] Neuromuscular Re-education [x] Cold/hotpack [] Iontophoresis: 4 mg/mL  [x] Instruction in HEP             Dexamethasone Sodium  [x] Manual Therapy             Phosphate 40-80 mAmin  [] Aquatic Therapy   [] Other:    []  Medication allergies reviewed for use of    Dexamethasone Sodium Phosphate 4mg/ml     with iontophoresis

## 2020-11-05 ENCOUNTER — HOSPITAL ENCOUNTER (OUTPATIENT)
Dept: PHYSICAL THERAPY | Facility: CLINIC | Age: 25
Setting detail: THERAPIES SERIES
Discharge: HOME | End: 2020-11-05
Payer: COMMERCIAL

## 2020-11-05 NOTE — FLOWSHEET NOTE
[] [facility]) [facility] &  Therapy  [address]    [phone]  F: [phone]   [x] [address]  [facility]   [address]  P: [phone]  F: [phone]  [] [place]  [address]  P: [phone]  F: [phone] [] [address]  P: [phone]  F: [phone]  [] [address]  [facility]   [address]   [place]: [phone]  F: [phone]   [] [address]  [place]: [phone]   F: [phone]     Physical Therapy Cancel/No Show note    Date: 2020  Patient: Liz Sanchez  : 1995  MRN: [id number]    Cancels/No Shows to date:     For today's appointment patient:    [x]  Cancelled    [] Rescheduled appointment    [] No-show     Reason given by patient:    []  Patient ill    []  Conflicting appointment    [] No transportation      [] Conflict with work    [x] No reason given    [] Weather related    [] COVID-19    [] Other:      Comments:        [x] Next appointment was confirmed    Electronically signed by: Estell Soulier, PTA

## 2021-01-14 ENCOUNTER — HOSPITAL ENCOUNTER (OUTPATIENT)
Dept: PHYSICAL THERAPY | Facility: CLINIC | Age: 26
Setting detail: THERAPIES SERIES
Discharge: HOME | End: 2021-01-14
Payer: COMMERCIAL

## 2021-01-14 NOTE — DISCHARGE SUMMARY
[] [facility]        Outpatient Physical                Therapy       [address]       Phone: [phone]       Fax: [phone] [x] [facility] at [address] [place] [address]       Phone: [phone]       Fax: [phone] [] [facility] [address]     Phone: [phone]     Fax:  [phone]     Physical Therapy Discharge Note    Date: 2021      Patient: Humble Osborn  : 1995  MRN: [id number]    Physician: Lakisha Stapleton MD                                    Insurance: [facility]  Medical Diagnosis: M54.5, G89.29 (ICD-10-CM) - Chronic midline low back pain without sciatica                      Rehab Codes: M54.5, M62.81  Onset Date: 2019                    Next 's appt.: TBD  Total visits attended: initial evaluation   Cancels/No shows:   Date of initial visit: 11/3/2020                Date of final visit: 11/3/2020       Discharge Status:     Pt failed to make additional appointments for therapy. Pt. Is now discharged. Electronically signed by: Cassandra Matthew PT    If you have any questions or concerns, please don't hesitate to call.   Thank you for your referral.

## 2021-08-30 ENCOUNTER — TELEPHONE (OUTPATIENT)
Dept: PRIMARY CARE CLINIC | Age: 26
End: 2021-08-30

## 2021-08-30 ENCOUNTER — OFFICE VISIT (OUTPATIENT)
Dept: PRIMARY CARE CLINIC | Age: 26
End: 2021-08-30
Payer: COMMERCIAL

## 2021-08-30 VITALS
HEIGHT: 72 IN | DIASTOLIC BLOOD PRESSURE: 88 MMHG | BODY MASS INDEX: 36.98 KG/M2 | HEART RATE: 98 BPM | OXYGEN SATURATION: 98 % | SYSTOLIC BLOOD PRESSURE: 136 MMHG | WEIGHT: 273 LBS

## 2021-08-30 DIAGNOSIS — I10 ESSENTIAL HYPERTENSION: Primary | ICD-10-CM

## 2021-08-30 DIAGNOSIS — E66.01 CLASS 2 SEVERE OBESITY WITH SERIOUS COMORBIDITY AND BODY MASS INDEX (BMI) OF 36.0 TO 36.9 IN ADULT, UNSPECIFIED OBESITY TYPE (HCC): ICD-10-CM

## 2021-08-30 DIAGNOSIS — F17.210 CIGARETTE NICOTINE DEPENDENCE WITHOUT COMPLICATION: ICD-10-CM

## 2021-08-30 DIAGNOSIS — F31.2: ICD-10-CM

## 2021-08-30 PROCEDURE — G8417 CALC BMI ABV UP PARAM F/U: HCPCS | Performed by: PHYSICIAN ASSISTANT

## 2021-08-30 PROCEDURE — 4004F PT TOBACCO SCREEN RCVD TLK: CPT | Performed by: PHYSICIAN ASSISTANT

## 2021-08-30 PROCEDURE — 99213 OFFICE O/P EST LOW 20 MIN: CPT | Performed by: PHYSICIAN ASSISTANT

## 2021-08-30 PROCEDURE — G8427 DOCREV CUR MEDS BY ELIG CLIN: HCPCS | Performed by: PHYSICIAN ASSISTANT

## 2021-08-30 RX ORDER — AMLODIPINE BESYLATE 10 MG/1
10 TABLET ORAL DAILY
Qty: 90 TABLET | Refills: 1 | Status: SHIPPED | OUTPATIENT
Start: 2021-08-30

## 2021-08-30 SDOH — ECONOMIC STABILITY: FOOD INSECURITY: WITHIN THE PAST 12 MONTHS, YOU WORRIED THAT YOUR FOOD WOULD RUN OUT BEFORE YOU GOT MONEY TO BUY MORE.: NEVER TRUE

## 2021-08-30 SDOH — ECONOMIC STABILITY: FOOD INSECURITY: WITHIN THE PAST 12 MONTHS, THE FOOD YOU BOUGHT JUST DIDN'T LAST AND YOU DIDN'T HAVE MONEY TO GET MORE.: NEVER TRUE

## 2021-08-30 ASSESSMENT — ENCOUNTER SYMPTOMS
APNEA: 0
COUGH: 0
SHORTNESS OF BREATH: 0
CHEST TIGHTNESS: 0
COLOR CHANGE: 0
ABDOMINAL PAIN: 0

## 2021-08-30 ASSESSMENT — SOCIAL DETERMINANTS OF HEALTH (SDOH): HOW HARD IS IT FOR YOU TO PAY FOR THE VERY BASICS LIKE FOOD, HOUSING, MEDICAL CARE, AND HEATING?: NOT HARD AT ALL

## 2021-08-30 NOTE — PATIENT INSTRUCTIONS
Patient Education        Learning About Low-Carbohydrate Diets  What is a low-carbohydrate diet? A low-carbohydrate (or \"low-carb\") diet limits foods and drinks that have carbohydrates. This includes grains, fruits, milk and yogurt, and starchy vegetables like potatoes, beans, and corn. It also avoids foods and drinks that have added sugar. Instead, low-carb diets include foods that are high in protein and fat. Why might you follow a low-carb diet? Low-carb diets may be used for a variety of reasons, such as for weight loss. People who have diabetes may use a low-carb diet to help manage their blood sugar levels. What should you do before you start the diet? Talk to your doctor before you try any diet. This is even more important if you have health problems like kidney disease, heart disease, or diabetes. Your doctor may suggest that you meet with a registered dietitian. A dietitian can help you make an eating plan that works for you. What foods do you eat on a low-carb diet? On a low-carb diet, you choose foods that are high in protein and fat. Examples of these are:  · Meat, poultry, and fish. · Eggs. · Nuts, such as walnuts, pecans, almonds, and peanuts. · Peanut butter and other nut butters. · Tofu. · Avocado. · Aura Ishmael. · Non-starchy vegetables like broccoli, cauliflower, green beans, mushrooms, peppers, lettuce, and spinach. · Unsweetened non-dairy milks like almond milk and coconut milk. · Cheese, cottage cheese, and cream cheese. Current as of: December 17, 2020               Content Version: 12.9  © 2006-2021 Healthwise, [employer]. Care instructions adapted under license by [facility] ([facility]). If you have questions about a medical condition or this instruction, always ask your healthcare professional. [address] any warranty or liability for your use of this information.          Patient Education        Body Mass Index: Care Instructions  Your Care Instructions Body mass index (BMI) can help you see if your weight is raising your risk for health problems. It uses a formula to compare how much you weigh with how tall you are. · A BMI lower than 18.5 is considered underweight. · A BMI between 18.5 and 24.9 is considered healthy. · A BMI between 25 and 29.9 is considered overweight. A BMI of 30 or higher is considered obese. If your BMI is in the normal range, it means that you have a lower risk for weight-related health problems. If your BMI is in the overweight or obese range, you may be at increased risk for weight-related health problems, such as high blood pressure, heart disease, stroke, arthritis or joint pain, and diabetes. If your BMI is in the underweight range, you may be at increased risk for health problems such as fatigue, lower protection (immunity) against illness, muscle loss, bone loss, hair loss, and hormone problems. BMI is just one measure of your risk for weight-related health problems. You may be at higher risk for health problems if you are not active, you eat an unhealthy diet, or you drink too much alcohol or use tobacco products. Follow-up care is a key part of your treatment and safety. Be sure to make and go to all appointments, and call your doctor if you are having problems. It's also a good idea to know your test results and keep a list of the medicines you take. How can you care for yourself at home? · Practice healthy eating habits. This includes eating plenty of fruits, vegetables, whole grains, lean protein, and low-fat dairy. · If your doctor recommends it, get more exercise. Walking is a good choice. Bit by bit, increase the amount you walk every day. Try for at least 30 minutes on most days of the week. · Do not smoke. Smoking can increase your risk for health problems. If you need help quitting, talk to your doctor about stop-smoking programs and medicines. These can increase your chances of quitting for good.   · Limit alcohol to 2 drinks a day for men and 1 drink a day for women. Too much alcohol can cause health problems. If you have a BMI higher than 25  · Your doctor may do other tests to check your risk for weight-related health problems. This may include measuring the distance around your waist. A waist measurement of more than 40 inches in men or 35 inches in women can increase the risk of weight-related health problems. · Talk with your doctor about steps you can take to stay healthy or improve your health. You may need to make lifestyle changes to lose weight and stay healthy, such as changing your diet and getting regular exercise. If you have a BMI lower than 18.5  · Your doctor may do other tests to check your risk for health problems. · Talk with your doctor about steps you can take to stay healthy or improve your health. You may need to make lifestyle changes to gain or maintain weight and stay healthy, such as getting more healthy foods in your diet and doing exercises to build muscle. Where can you learn more? Go to [email or web address] org and sign in to your [employer] account. Enter S176 in the [place] box to learn more about \"Body Mass Index: Care Instructions. \"     If you do not have an account, please click on the \"Sign Up Now\" link. Current as of: March 17, 2021               Content Version: 12.9  © 4120-2137 Healthwise, Incorporated. Care instructions adapted under license by [facility] ([facility]). If you have questions about a medical condition or this instruction, always ask your healthcare professional. [place] [address] any warranty or liability for your use of this information. Patient Education        Starting a Weight Loss Plan: Care Instructions  Overview     If you're thinking about losing weight, it can be hard to know where to start. Your doctor can help you set up a weight loss plan that best meets your needs.  You may want to take a class on nutrition or exercise, or you could join a weight loss support group. If you have questions about how to make changes to your eating or exercise habits, ask your doctor about seeing a registered dietitian or an exercise specialist.  It can be a big challenge to lose weight. But you don't have to make huge changes at once. Make small changes, and stick with them. When those changes become habit, add a few more changes. If you don't think you're ready to make changes right now, try to pick a date in the future. Make an appointment to see your doctor to discuss whether the time is right for you to start a plan. Follow-up care is a key part of your treatment and safety. Be sure to make and go to all appointments, and call your doctor if you are having problems. It's also a good idea to know your test results and keep a list of the medicines you take. How can you care for yourself at home? · Set realistic goals. Many people expect to lose much more weight than is likely. A weight loss of 5% to 10% of your body weight may be enough to improve your health. · Get family and friends involved to provide support. Talk to them about why you are trying to lose weight, and ask them to help. They can help by participating in exercise and having meals with you, even if they may be eating something different. · Find what works best for you. If you do not have time or do not like to cook, a program that offers meal replacement bars or shakes may be better for you. Or if you like to prepare meals, finding a plan that includes daily menus and recipes may be best.  · Ask your doctor about other health professionals who can help you achieve your weight loss goals. ? A dietitian can help you make healthy changes in your diet. ?  An exercise specialist or  can help you develop a safe and effective exercise program.  ? A counselor or psychiatrist can help you cope with issues such as depression, anxiety, or family

## 2021-08-30 NOTE — PROGRESS NOTES
[address] [facility] PRIMARY CARE  [address] [facility] [address]  Dept: Corey Hilliard is a 30 y.o. male who presents today for his medical conditions/complaints as noted below. Chief Complaint   Patient presents with    Hypertension     pt said he does not check BP. Pt has no concerns       HPI:     HPI  Here for f/u on HTN--ran out of medication  NO CP, SOB, dizziness, syncope, palps. Never has labs done    LDL Cholesterol (mg/dL)   Date Value   2019 94       (goal LDL is <100)   AST (U/L)   Date Value   2019 23     ALT (U/L)   Date Value   2019 33     BUN (mg/dL)   Date Value   2019 10     BP Readings from Last 3 Encounters:   21 136/88   10/26/20 131/84   20 128/82          (goal 120/80)    Past Medical History:   Diagnosis Date    Essential hypertension 3/23/2020    Severe manic bipolar I disorder w/psychotic features, mood-congruent [facility])       Past Surgical History:   Procedure Laterality Date    FRACTURE SURGERY  2016    right hand 2016       Family History   Problem Relation Age of Onset    High Blood Pressure Mother         pre-htn    High Blood Pressure Father         pre-htn    Breast Cancer Paternal Grandmother     Stroke Paternal Great Grandmother        Social History     Tobacco Use    Smoking status: Current Every Day Smoker     Packs/day: 0.25     Years: 12.00     Pack years: 3.00     Types: Cigarettes     Last attempt to quit: 2019     Years since quittin.7    Smokeless tobacco: Never Used   Substance Use Topics    Alcohol use: Not Currently     Comment: patient reports drinking heavily but states he quit drinking 2 weeks ago -(19).       Current Outpatient Medications   Medication Sig Dispense Refill    nicotine (NICOTROL) 10 MG inhaler Inhale 1 puff into the lungs as needed for Smoking cessation 1 Inhaler 3    amLODIPine (NORVASC) 10 MG tablet Take 1 tablet by mouth daily 90 tablet 1    ARIPiprazole ER (ABILIFY MAINTENA) 400 MG SRER Inject 400 mg into the muscle every 30 days 1 each 0     No current facility-administered medications for this visit. No Known Allergies    Health Maintenance   Topic Date Due    Hepatitis C screen  Never done    Pneumococcal 0-64 years Vaccine (1 of 2 - PPSV23) Never done    COVID-19 Vaccine (1) Never done    HIV screen  Never done    Potassium monitoring  12/31/2020    Creatinine monitoring  12/31/2020    DTaP/Tdap/Td vaccine (6 - Td or Tdap) 09/21/2021 (Originally 11/30/2016)    Flu vaccine (1) 09/01/2021    Hepatitis A vaccine  Completed    Hepatitis B vaccine  Completed    Hib vaccine  Completed    Meningococcal (ACWY) vaccine  Aged Out    Varicella vaccine  Discontinued       Subjective:      Review of Systems   Constitutional: Negative for fatigue. Respiratory: Negative for apnea, cough, chest tightness and shortness of breath. Cardiovascular: Negative for chest pain, palpitations and leg swelling. Gastrointestinal: Negative for abdominal pain. Skin: Negative for color change. Neurological: Negative for dizziness, syncope, weakness, light-headedness and headaches. Psychiatric/Behavioral: Negative for sleep disturbance. The patient is not nervous/anxious. Objective:     /88   Pulse 98   Ht 6' (1.829 m)   Wt 273 lb (123.8 kg)   SpO2 98%   BMI 37.03 kg/m²   Physical Exam  Vitals reviewed. Neck:      Vascular: No carotid bruit. Cardiovascular:      Rate and Rhythm: Normal rate and regular rhythm. Heart sounds: Normal heart sounds. No murmur heard. No friction rub. No gallop. Pulmonary:      Effort: Pulmonary effort is normal.      Breath sounds: Normal breath sounds. Musculoskeletal:      Cervical back: No edema. Neurological:      Mental Status: He is alert and oriented to person, place, and time. Assessment:       Diagnosis Orders   1.  Essential hypertension  amLODIPine (NORVASC) 10 MG tablet   2. Class 2 severe obesity with serious comorbidity and body mass index (BMI) of 36.0 to 36.9 in adult, unspecified obesity type (HCC)  Glucose   3. Severe manic bipolar I disorder w/psychotic features, mood-congruent ([facility] [address]     4. Cigarette nicotine dependence without complication          Plan:    Discussed quitting smoking and given Nicotrol   Refilled HTN med  Have labs done. He will work on [facility] [employer]. Continues with psychiatrist    Return in about 6 months (around 2/28/2022) for HTN. Orders Placed This Encounter   Procedures    Glucose     Standing Status:   Future     Standing Expiration Date:   8/30/2022     Orders Placed This Encounter   Medications    nicotine (NICOTROL) 10 MG inhaler     Sig: Inhale 1 puff into the lungs as needed for Smoking cessation     Dispense:  1 Inhaler     Refill:  3    amLODIPine (NORVASC) 10 MG tablet     Sig: Take 1 tablet by mouth daily     Dispense:  90 tablet     Refill:  1       Patient given educational materials - see patient instructions. Discussed use, benefit, and side effects of prescribed medications. All patient questions answered. Pt voiced understanding. Reviewed health maintenance. Instructed to continue current medications, diet and exercise. Patient agreed with treatment plan. Follow up as directed.      Electronically signed by Mary Hutchinson PA-C on 8/30/2021 at 2:28 PM

## 2021-10-06 ENCOUNTER — NURSE ONLY (OUTPATIENT)
Dept: PRIMARY CARE CLINIC | Age: 26
End: 2021-10-06
Payer: COMMERCIAL

## 2021-10-06 DIAGNOSIS — Z23 NEED FOR VACCINATION: Primary | ICD-10-CM

## 2021-10-06 PROCEDURE — 86580 TB INTRADERMAL TEST: CPT | Performed by: FAMILY MEDICINE

## 2021-10-06 NOTE — PROGRESS NOTES
After obtaining consent, and per orders of Dr. Dara Andino, injection of PPD given in Right arm by Thera Officer, MA. Patient tolerated well.

## 2021-10-08 ENCOUNTER — NURSE ONLY (OUTPATIENT)
Dept: PRIMARY CARE CLINIC | Age: 26
End: 2021-10-08

## 2021-10-08 LAB
INDURATION: 0
TB SKIN TEST: NEGATIVE

## 2021-10-08 NOTE — PROGRESS NOTES
PPD Reading Note  PPD read and results entered in [address]. Result: 0 mm induration.   Interpretation: Negative  If test not read within 48-72 hours of initial placement, patient advised to repeat in other arm 1-3 weeks after this test.  Allergic reaction: no

## 2021-10-12 ENCOUNTER — NURSE ONLY (OUTPATIENT)
Dept: PRIMARY CARE CLINIC | Age: 26
End: 2021-10-12
Payer: COMMERCIAL

## 2021-10-12 DIAGNOSIS — Z11.1 ENCOUNTER FOR PPD TEST: Primary | ICD-10-CM

## 2021-10-12 PROCEDURE — 86580 TB INTRADERMAL TEST: CPT | Performed by: PHYSICIAN ASSISTANT

## 2021-10-12 NOTE — PROGRESS NOTES
After obtaining consent, and per orders of Dr. Jay Scott PA-C injection of mantoux given in Left arm by Raj Bland MA. Patient tolerated it well.

## 2021-10-14 ENCOUNTER — NURSE ONLY (OUTPATIENT)
Dept: PRIMARY CARE CLINIC | Age: 26
End: 2021-10-14

## 2021-10-14 DIAGNOSIS — Z11.1 ENCOUNTER FOR PPD SKIN TEST READING: Primary | ICD-10-CM

## 2021-12-01 ENCOUNTER — HOSPITAL ENCOUNTER (EMERGENCY)
Age: 26
Discharge: HOME | End: 2021-12-01
Attending: EMERGENCY MEDICINE
Payer: COMMERCIAL

## 2021-12-01 VITALS
HEIGHT: 72 IN | BODY MASS INDEX: 37.65 KG/M2 | WEIGHT: 278 LBS | RESPIRATION RATE: 16 BRPM | TEMPERATURE: 99.3 F | SYSTOLIC BLOOD PRESSURE: 141 MMHG | HEART RATE: 113 BPM | DIASTOLIC BLOOD PRESSURE: 81 MMHG | OXYGEN SATURATION: 97 %

## 2021-12-01 DIAGNOSIS — M79.604 BILATERAL LEG PAIN: Primary | ICD-10-CM

## 2021-12-01 DIAGNOSIS — M79.605 BILATERAL LEG PAIN: Primary | ICD-10-CM

## 2021-12-01 PROCEDURE — 93970 EXTREMITY STUDY: CPT

## 2021-12-01 PROCEDURE — 99282 EMERGENCY DEPT VISIT SF MDM: CPT

## 2021-12-01 ASSESSMENT — ENCOUNTER SYMPTOMS
SHORTNESS OF BREATH: 0
VOMITING: 0
DIARRHEA: 0
ABDOMINAL PAIN: 0
COUGH: 0
COLOR CHANGE: 0
CONSTIPATION: 0
FACIAL SWELLING: 0
EYE DISCHARGE: 0
EYE REDNESS: 0

## 2021-12-01 ASSESSMENT — PAIN SCALES - GENERAL: PAINLEVEL_OUTOF10: 5

## 2021-12-01 ASSESSMENT — PAIN DESCRIPTION - ORIENTATION: ORIENTATION: RIGHT;LEFT;LOWER

## 2021-12-01 ASSESSMENT — PAIN DESCRIPTION - LOCATION: LOCATION: LEG

## 2021-12-01 NOTE — ED PROVIDER NOTES
[address] [place] [address] ED  EMERGENCY DEPARTMENT ENCOUNTER      Pt Name: Ning Cary  MRN: [id number]  [place] 1995  Date of evaluation: 12/1/2021  Provider: Ambar Sheridan MD    CHIEF COMPLAINT       Chief Complaint   Patient presents with    Leg Pain         HISTORY OF PRESENT ILLNESS  (Location/Symptom, Timing/Onset, Context/Setting, Quality, Duration, Modifying Factors, Severity.)   Ning Cary is a 27 y.o. male who presents to the emergency department for bilateral leg pain. He is complaining of pain in his calves and he is worried that he might have a blood clot. He was told by several people that he might have 1 so he came here to find out. No injury or unusual activity and started yesterday. Rated the pain as a 5. Nursing Notes were reviewed. ALLERGIES     Patient has no known allergies. CURRENT MEDICATIONS       Previous Medications    AMLODIPINE (NORVASC) 10 MG TABLET    Take 1 tablet by mouth daily    ARIPIPRAZOLE ER (ABILIFY MAINTENA) 400 MG SRER    Inject 400 mg into the muscle every 30 days    NICOTINE (NICOTROL) 10 MG INHALER    Inhale 1 puff into the lungs as needed for Smoking cessation       PAST MEDICAL HISTORY         Diagnosis Date    Essential hypertension 3/23/2020    Severe manic bipolar I disorder w/psychotic features, mood-congruent ([facility] [address]        SURGICAL HISTORY           Procedure Laterality Date    FRACTURE SURGERY  2016    right hand 2016         FAMILY HISTORY           Problem Relation Age of Onset    High Blood Pressure Mother         pre-htn    High Blood Pressure Father         pre-htn    Breast Cancer Paternal Grandmother     Stroke Paternal Great Grandmother      Family Status   Relation Name Status    Mother  (Not Specified)    Father  (Not Specified)    PGM  (Not Specified)    PGGM  (Not Specified)        SOCIAL HISTORY      reports that he has been smoking cigarettes. He has a 3.00 pack-year smoking history.  He has never used smokeless tobacco. He reports previous alcohol use. He reports previous drug use. Drug: Marijuana Vickie Grayson). REVIEW OF SYSTEMS    (2-9 systems for level 4, 10 or more for level 5)     Review of Systems   Constitutional: Negative for chills, fatigue and fever. HENT: Negative for congestion, ear discharge and facial swelling. Eyes: Negative for discharge and redness. Respiratory: Negative for cough and shortness of breath. Cardiovascular: Negative for chest pain. Gastrointestinal: Negative for abdominal pain, constipation, diarrhea and vomiting. Genitourinary: Negative for dysuria and hematuria. Musculoskeletal: Negative for arthralgias. Skin: Negative for color change and rash. Neurological: Negative for syncope, numbness and headaches. Hematological: Negative for adenopathy. Psychiatric/Behavioral: Negative for confusion. The patient is not nervous/anxious. Except as noted above the remainder of the review of systems was reviewed and negative. PHYSICAL EXAM    (up to 7 for level 4, 8 or more for level 5)     Vitals:    12/01/21 0707   BP: (!) 141/81   Pulse: 113   Resp: 16   Temp: 99.3 °F (37.4 °C)   TempSrc: Oral   SpO2: 97%   Weight: 278 lb (126.1 kg)   Height: 6' (1.829 m)       Physical Exam  Vitals reviewed. Constitutional:       General: He is not in acute distress. Appearance: He is well-developed. He is not diaphoretic. HENT:      Head: Normocephalic and atraumatic. Eyes:      General: No scleral icterus. Right eye: No discharge. Left eye: No discharge. Cardiovascular:      Rate and Rhythm: Normal rate and regular rhythm. Pulmonary:      Effort: Pulmonary effort is normal. No respiratory distress. Breath sounds: Normal breath sounds. No stridor. No wheezing or rales. Abdominal:      General: There is no distension. Palpations: Abdomen is soft. Tenderness: There is no abdominal tenderness. Musculoskeletal:         General: Normal range of motion. Cervical back: Neck supple. Comments: No calf masses or bruises or rash. Lymphadenopathy:      Cervical: No cervical adenopathy. Skin:     General: Skin is warm and dry. Findings: No erythema or rash. Neurological:      Mental Status: He is alert and oriented to person, place, and time. Psychiatric:         Behavior: Behavior normal.             DIAGNOSTIC RESULTS     EKG: All EKG's are interpreted by the Emergency Department Physician who either signs or Co-signs this chart in the absence of a cardiologist.    RADIOLOGY:   Non-plain film images such as CT, Ultrasound and MRI are read by the radiologist. Plain radiographic images are visualized and preliminarily interpreted by the emergency physician with the below findings:    Interpretation per the Radiologist below, if available at the time of this note:        LABS:  Labs Reviewed - No data to display    All other labs were within normal range or not returned as of this dictation. EMERGENCY DEPARTMENT COURSE and DIFFERENTIAL DIAGNOSIS/MDM:   Vitals:    Vitals:    12/01/21 0707   BP: (!) 141/81   Pulse: 113   Resp: 16   Temp: 99.3 °F (37.4 °C)   TempSrc: Oral   SpO2: 97%   Weight: 278 lb (126.1 kg)   Height: 6' (1.829 m)       No orders of the defined types were placed in this encounter. Medical Decision Making: Doppler is negative and patient informed. My clinical impression is that this is muscle pain. Treatment diagnosis and follow-up were discussed with the patient. CONSULTS:  None    PROCEDURES:  None    FINAL IMPRESSION      1.  Bilateral leg pain          DISPOSITION/PLAN   DISPOSITION Decision To Discharge 12/01/2021 09:15:38 AM      PATIENT REFERRED TO:   Travis Barnard PA-C  [address]  [facility] [address]  [phone]      As needed    [facility] ED  [address] [facility]  [phone]    If symptoms worsen      DISCHARGE MEDICATIONS:     New Prescriptions    No medications on file       The care is provided during an unprecedented national emergency due to the novel coronavirus, COVID-19.     (Please note that portions of this note were completed with a voice recognition program.  Efforts were made to edit the dictations but occasionally words are mis-transcribed.)    Misael Soni MD  Attending Emergency Physician            Misael Soni MD  12/01/21 [id number]

## 2022-03-19 ENCOUNTER — HOSPITAL ENCOUNTER (EMERGENCY)
Age: 27
Discharge: HOME | End: 2022-03-19
Attending: EMERGENCY MEDICINE
Payer: COMMERCIAL

## 2022-03-19 VITALS
DIASTOLIC BLOOD PRESSURE: 86 MMHG | WEIGHT: 250 LBS | SYSTOLIC BLOOD PRESSURE: 151 MMHG | HEART RATE: 94 BPM | HEIGHT: 66 IN | BODY MASS INDEX: 40.18 KG/M2 | TEMPERATURE: 97.6 F | OXYGEN SATURATION: 97 % | RESPIRATION RATE: 14 BRPM

## 2022-03-19 DIAGNOSIS — H61.22 IMPACTED CERUMEN OF LEFT EAR: Primary | ICD-10-CM

## 2022-03-19 PROCEDURE — 99283 EMERGENCY DEPT VISIT LOW MDM: CPT

## 2022-03-19 PROCEDURE — 69209 REMOVE IMPACTED EAR WAX UNI: CPT

## 2022-03-19 ASSESSMENT — ENCOUNTER SYMPTOMS: VOMITING: 0

## 2022-03-19 NOTE — ED NOTES
Second irrig attempt; small amounts cerumen w/no improvement in hearing. RN visualized large piece of cerumen. PA notified.         Main Whitney RN  03/19/22 [id number]

## 2022-03-19 NOTE — ED PROVIDER NOTES
[address] [place] [address] ED  eMERGENCY dEPARTMENT eNCOUnter      Pt Name: Jitendra Willoughby  MRN: [id number]  [place] 1995  Date of evaluation: 3/19/22      CHIEF COMPLAINT       Chief Complaint   Patient presents with    Other     can't hear L ear         HISTORY OF PRESENT ILLNESS    Jitendra Willoughby is a 27 y.o. male who presents complaining of ***   The history is provided by the patient. Ear Problem  Location:  Left  Behind ear: Left ear feels plugged. Onset quality:  Gradual  Duration:  3 days  Timing:  Intermittent  Chronicity:  New  Relieved by:  Nothing  Worsened by:  Nothing  Ineffective treatments:  None tried  Associated symptoms: no congestion, no cough, no tinnitus and no vomiting        REVIEW OF SYSTEMS       Review of Systems   HENT: Negative for congestion and tinnitus. Respiratory: Negative for cough. Gastrointestinal: Negative for vomiting. All other systems reviewed and are negative. PAST MEDICAL HISTORY     Past Medical History:   Diagnosis Date    Essential hypertension 3/23/2020    Severe manic bipolar I disorder w/psychotic features, mood-congruent [facility])        SURGICAL HISTORY       Past Surgical History:   Procedure Laterality Date    FRACTURE SURGERY  2016    right hand 2016       CURRENT MEDICATIONS       Previous Medications    AMLODIPINE (NORVASC) 10 MG TABLET    Take 1 tablet by mouth daily    ARIPIPRAZOLE ER (ABILIFY MAINTENA) 400 MG SRER    Inject 400 mg into the muscle every 30 days    NICOTINE (NICOTROL) 10 MG INHALER    Inhale 1 puff into the lungs as needed for Smoking cessation       ALLERGIES     has No Known Allergies. FAMILY HISTORY     He indicated that the status of his mother is unknown. He indicated that the status of his father is unknown. He indicated that the status of his paternal grandmother is unknown. He indicated that the status of his paternal great grandmother is unknown. SOCIAL HISTORY      reports that he has been smoking cigarettes.  He has a 3.00 pack-year smoking history. He has never used smokeless tobacco. He reports previous alcohol use. He reports previous drug use. Drug: Marijuana Lyndel Peewee). PHYSICAL EXAM     INITIAL VITALS: BP (!) 151/86   Pulse 94   Temp 97.6 °F (36.4 °C) (Oral)   Resp 14   Ht 5' 6\" (1.676 m)   Wt 250 lb (113.4 kg)   SpO2 97%   BMI 40.35 kg/m²      Physical Exam  Vitals and nursing note reviewed. Constitutional:       Appearance: He is well-developed. HENT:      Head: Normocephalic and atraumatic. Right Ear: Hearing and tympanic membrane normal.      Left Ear: Hearing normal. There is impacted cerumen. Cardiovascular:      Rate and Rhythm: Normal rate and regular rhythm. Heart sounds: Normal heart sounds. Pulmonary:      Effort: Pulmonary effort is normal.      Breath sounds: Normal breath sounds. Neurological:      Mental Status: He is alert and oriented to person, place, and time. MEDICAL DECISION MAKING:     ***    DIAGNOSTIC RESULTS     EKG: All EKG's are interpreted by the Emergency Department Physician who either signs or Co-signs this chart in the absence of acardiologist.    ***    RADIOLOGY:Allplain film, CT, MRI, and formal ultrasound images (except ED bedside ultrasound) are read by the radiologist and the images and interpretations are directly viewed by the emergency physician. LABS:All lab results were reviewed by myself, and all abnormals are listed below. Labs Reviewed - No data to display      EMERGENCY DEPARTMENT COURSE:   Vitals:    Vitals:    03/19/22 1614   BP: (!) 151/86   Pulse: 94   Resp: 14   Temp: 97.6 °F (36.4 °C)   TempSrc: Oral   SpO2: 97%   Weight: 250 lb (113.4 kg)   Height: 5' 6\" (1.676 m)       The patient was given the following medications while in the emergency department:  No orders of the defined types were placed in this encounter.       -------------------------  ***    CRITICAL CARE:   ***    CONSULTS:  None    PROCEDURES:  Procedures    FINAL IMPRESSION      1.  Impacted cerumen of left ear          DISPOSITION/PLAN   DISPOSITION Discharge - Pending Orders Complete 03/19/2022 04:24:34 PM      PATIENT REFERREDTO:  Ana Rosa Oh PA-C  [address]   [phone]    Schedule an appointment as soon as possible for a visit in 2 days      [facility] ED  [address] [facility]  [phone]    If symptoms worsen      DISCHARGEMEDICATIONS:  New Prescriptions    No medications on file       (Please note that portions of this note were completed with a voice recognition program.  Efforts were made to edit thedictations but occasionally words are mis-transcribed.)    Richa Vieira PA-C

## 2022-03-19 NOTE — LETTER
[facility] Emergency Department      [address] [place] [address], [address] [place] [address] [phone]            PROOF OF PRESENCE      To Whom It May Concern:    Wilmar Hernandez was present in the Emergency Department at [facility] on 3/19/2022.                                      Sincerely,

## 2022-03-19 NOTE — ED NOTES
Attempted to flush ear w/same solution; minimal wax produced. RN can see large piece of wax in ear canal. Ear soaking w/same solution for another 5 min.      Jaimee Parra RN  03/19/22 1065

## 2022-03-19 NOTE — ED NOTES
Pt educated on ear irrigation procedure; agrees to procedure and denies questions. L ear canal soaking using peroxide/sterile water solution.      Luna Porter RN  03/19/22 0634

## 2022-03-19 NOTE — ED NOTES
Large amount of cerumen removed from L ear after 3 more attempts by RN and 2 by PA. Pt states improvement in hearing.      Conrado Yusuf RN  03/19/22 2011

## 2022-03-19 NOTE — ED PROVIDER NOTES
[address] [place] [address] ED  eMERGENCY dEPARTMENT eNCOUnter      Pt Name: Marshall Motta  MRN: [id number]  [place] 1995  Date of evaluation: 3/19/22      CHIEF COMPLAINT       Chief Complaint   Patient presents with    Other     can't hear L ear         HISTORY OF PRESENT ILLNESS    Marshall Motta is a 27 y.o. male who presents complaining of  Decreased hearing left ear  The history is provided by the patient. Ear Problem  Location:  Left (Cerumen impaction left ear he is here for irrigation he has decreased hearing from his left ear)  Severity:  No pain  Onset quality:  Gradual  Duration:  3 days  Timing:  Intermittent  Progression:  Resolved  Chronicity:  Recurrent  Relieved by:  Nothing  Worsened by:  Nothing  Ineffective treatments:  None tried  Associated symptoms: no tinnitus and no vomiting        REVIEW OF SYSTEMS       Review of Systems   HENT: Positive for ear pain. Negative for tinnitus. Gastrointestinal: Negative for vomiting. All other systems reviewed and are negative. PAST MEDICAL HISTORY     Past Medical History:   Diagnosis Date    Essential hypertension 3/23/2020    Severe manic bipolar I disorder w/psychotic features, mood-congruent [facility])        SURGICAL HISTORY       Past Surgical History:   Procedure Laterality Date    FRACTURE SURGERY  2016    right hand 2016       CURRENT MEDICATIONS       Discharge Medication List as of 3/19/2022  5:32 PM      CONTINUE these medications which have NOT CHANGED    Details   nicotine (NICOTROL) 10 MG inhaler Inhale 1 puff into the lungs as needed for Smoking cessation, Disp-1 Inhaler, R-3Normal      amLODIPine (NORVASC) 10 MG tablet Take 1 tablet by mouth daily, Disp-90 tablet, R-1Normal      ARIPiprazole ER (ABILIFY MAINTENA) 400 MG SRER Inject 400 mg into the muscle every 30 days, Disp-1 each, R-0NO PRINT             ALLERGIES     has No Known Allergies. FAMILY HISTORY     He indicated that the status of his mother is unknown.  He indicated that the status of his father is unknown. He indicated that the status of his paternal grandmother is unknown. He indicated that the status of his paternal great grandmother is unknown. SOCIAL HISTORY      reports that he has been smoking cigarettes. He has a 3.00 pack-year smoking history. He has never used smokeless tobacco. He reports previous alcohol use. He reports previous drug use. Drug: Marijuana Lanis Carlitos). PHYSICAL EXAM     INITIAL VITALS: BP (!) 151/86   Pulse 94   Temp 97.6 °F (36.4 °C) (Oral)   Resp 14   Ht 5' 6\" (1.676 m)   Wt 250 lb (113.4 kg)   SpO2 97%   BMI 40.35 kg/m²      Physical Exam  Vitals and nursing note reviewed. Constitutional:       Appearance: He is well-developed. HENT:      Head: Normocephalic and atraumatic. Ears:      Comments: Left ear cerumen impaction there is some cerumen in the right ear canal but it is not impacted  Cardiovascular:      Rate and Rhythm: Normal rate and regular rhythm. Heart sounds: Normal heart sounds. Pulmonary:      Effort: Pulmonary effort is normal.      Breath sounds: Normal breath sounds. Neurological:      Mental Status: He is alert and oriented to person, place, and time. MEDICAL DECISION MAKING:     Left ear irrigated with warm water solution large cerumen and returns. He feels much better will discharge home. DIAGNOSTIC RESULTS     EKG: All EKG's are interpreted by the Emergency Department Physician who either signs or Co-signs this chart in the absence of acardiologist.        RADIOLOGY:Allplain film, CT, MRI, and formal ultrasound images (except ED bedside ultrasound) are read by the radiologist and the images and interpretations are directly viewed by the emergency physician. LABS:All lab results were reviewed by myself, and all abnormals are listed below.   Labs Reviewed - No data to display      EMERGENCY DEPARTMENT COURSE:   Vitals:    Vitals:    03/19/22 1614   BP: (!) 151/86   Pulse: 94   Resp: 14 Temp: 97.6 °F (36.4 °C)   TempSrc: Oral   SpO2: 97%   Weight: 250 lb (113.4 kg)   Height: 5' 6\" (1.676 m)       The patient was given the following medications while in the emergency department:  No orders of the defined types were placed in this encounter. -------------------------      CRITICAL CARE:       CONSULTS:  None    PROCEDURES:  Procedures    FINAL IMPRESSION      1.  Impacted cerumen of left ear          DISPOSITION/PLAN   DISPOSITION Decision To Discharge 03/19/2022 05:32:45 PM      PATIENT REFERREDTO:  Herminia Watts PA-C  [address]  [phone]    Schedule an appointment as soon as possible for a visit in 2 days      [facility] ED  [address] [facility]  [phone]    If symptoms worsen      DISCHARGEMEDICATIONS:  Discharge Medication List as of 3/19/2022  5:32 PM          (Please note that portions of this note were completed with a voice recognition program.  Efforts were made to edit thedictations but occasionally words are mis-transcribed.)    LEE Ji PA-C  03/19/22 LEE Najera  03/23/22 0528

## 2022-03-19 NOTE — ED NOTES
PA and RN at bedside for manual cerumen removal. Med amount extracted. Ear soaking for third time per PA.      Mario Rosales, MARGARET  03/19/22 1351

## 2022-03-21 NOTE — ED PROVIDER NOTES
eMERGENCY dEPARTMENT eNCOUnter   Independent Attestation     Pt Name: Jesus Manuel Castro  MRN: [id number]  [place] 1995  Date of evaluation: 3/20/22     Jesus Manuel Castro is a 27 y.o. male with CC: Other (can't hear L ear)        This visit was performed by both a physician and an APC. I performed all aspects of the MDM as documented. The care is provided during an unprecedented national emergency due to the novel coronavirus, COVID 19.     Ian Moran MD  Attending Emergency Physician            Ian Moran MD  03/20/22 2002

## 2023-01-13 DIAGNOSIS — I10 ESSENTIAL HYPERTENSION: ICD-10-CM

## 2023-01-13 RX ORDER — AMLODIPINE BESYLATE 10 MG/1
TABLET ORAL
Qty: 30 TABLET | OUTPATIENT
Start: 2023-01-13

## 2023-01-19 DIAGNOSIS — I10 ESSENTIAL HYPERTENSION: ICD-10-CM

## 2023-01-20 RX ORDER — AMLODIPINE BESYLATE 10 MG/1
TABLET ORAL
Qty: 30 TABLET | OUTPATIENT
Start: 2023-01-20

## 2023-01-31 DIAGNOSIS — I10 ESSENTIAL HYPERTENSION: ICD-10-CM

## 2023-01-31 RX ORDER — AMLODIPINE BESYLATE 10 MG/1
10 TABLET ORAL DAILY
Qty: 30 TABLET | Refills: 0 | Status: SHIPPED | OUTPATIENT
Start: 2023-01-31

## 2023-01-31 NOTE — TELEPHONE ENCOUNTER
Damian on [place] [facility] listed. PT IS SCHEDULED FOR A CK UP ON HIS HTN NEXT FRI 2/23. ASKING, IF YOU WOULD COVER HIM, UNTIL HIS APPT?

## 2023-03-29 ENCOUNTER — HOSPITAL ENCOUNTER (OUTPATIENT)
Age: 28
Discharge: HOME | End: 2023-03-29
Payer: COMMERCIAL

## 2023-03-29 LAB
ABSOLUTE EOS #: 0.07 K/UL (ref 0–0.44)
ABSOLUTE IMMATURE GRANULOCYTE: 0.06 K/UL (ref 0–0.3)
ABSOLUTE LYMPH #: 3.04 K/UL (ref 1.1–3.7)
ABSOLUTE MONO #: 0.53 K/UL (ref 0.1–1.2)
ALBUMIN SERPL-MCNC: 4.7 G/DL (ref 3.5–5.2)
ALBUMIN/GLOBULIN RATIO: 1.6 (ref 1–2.5)
ALP SERPL-CCNC: 87 U/L (ref 40–129)
ALT SERPL-CCNC: 74 U/L (ref 5–41)
ANION GAP SERPL CALCULATED.3IONS-SCNC: 12 MMOL/L (ref 9–17)
AST SERPL-CCNC: 37 U/L
BASOPHILS # BLD: 1 % (ref 0–2)
BASOPHILS ABSOLUTE: 0.05 K/UL (ref 0–0.2)
BILIRUB SERPL-MCNC: 0.5 MG/DL (ref 0.3–1.2)
BUN SERPL-MCNC: 10 MG/DL (ref 6–20)
CALCIUM SERPL-MCNC: 9.7 MG/DL (ref 8.6–10.4)
CHLORIDE SERPL-SCNC: 104 MMOL/L (ref 98–107)
CHOLEST SERPL-MCNC: 206 MG/DL
CHOLESTEROL/HDL RATIO: 4.4
CO2 SERPL-SCNC: 26 MMOL/L (ref 20–31)
CREAT SERPL-MCNC: 1.17 MG/DL (ref 0.7–1.2)
EOSINOPHILS RELATIVE PERCENT: 1 % (ref 1–4)
EST. AVERAGE GLUCOSE BLD GHB EST-MCNC: 114 MG/DL
GFR SERPL CREATININE-BSD FRML MDRD: >60 ML/MIN/1.73M2
GLUCOSE SERPL-MCNC: 106 MG/DL (ref 70–99)
HBA1C MFR BLD: 5.6 % (ref 4–6)
HCT VFR BLD AUTO: 49.5 % (ref 40.7–50.3)
HDLC SERPL-MCNC: 47 MG/DL
HGB BLD-MCNC: 16.3 G/DL (ref 13–17)
HIV 1+2 AB+HIV1 P24 AG SERPL QL IA: NONREACTIVE
IMMATURE GRANULOCYTES: 1 %
LDLC SERPL CALC-MCNC: 147 MG/DL (ref 0–130)
LYMPHOCYTES # BLD: 33 % (ref 24–43)
MCH RBC QN AUTO: 29.1 PG (ref 25.2–33.5)
MCHC RBC AUTO-ENTMCNC: 32.9 G/DL (ref 28.4–34.8)
MCV RBC AUTO: 88.4 FL (ref 82.6–102.9)
MONOCYTES # BLD: 6 % (ref 3–12)
NRBC AUTOMATED: 0 PER 100 WBC
PDW BLD-RTO: 14 % (ref 11.8–14.4)
PLATELET # BLD AUTO: 287 K/UL (ref 138–453)
PMV BLD AUTO: 9.9 FL (ref 8.1–13.5)
POTASSIUM SERPL-SCNC: 3.9 MMOL/L (ref 3.7–5.3)
PROT SERPL-MCNC: 7.6 G/DL (ref 6.4–8.3)
RBC # BLD: 5.6 M/UL (ref 4.21–5.77)
SEG NEUTROPHILS: 58 % (ref 36–65)
SEGMENTED NEUTROPHILS ABSOLUTE COUNT: 5.34 K/UL (ref 1.5–8.1)
SODIUM SERPL-SCNC: 142 MMOL/L (ref 135–144)
TRIGL SERPL-MCNC: 62 MG/DL
TSH SERPL-ACNC: 2.53 UIU/ML (ref 0.3–5)
VIT B12 SERPL-MCNC: 699 PG/ML (ref 232–1245)
WBC # BLD AUTO: 9.1 K/UL (ref 3.5–11.3)

## 2023-03-29 PROCEDURE — 83036 HEMOGLOBIN GLYCOSYLATED A1C: CPT

## 2023-03-29 PROCEDURE — 82607 VITAMIN B-12: CPT

## 2023-03-29 PROCEDURE — 84443 ASSAY THYROID STIM HORMONE: CPT

## 2023-03-29 PROCEDURE — 82570 ASSAY OF URINE CREATININE: CPT

## 2023-03-29 PROCEDURE — 82043 UR ALBUMIN QUANTITATIVE: CPT

## 2023-03-29 PROCEDURE — 87389 HIV-1 AG W/HIV-1&-2 AB AG IA: CPT

## 2023-03-29 PROCEDURE — 36415 COLL VENOUS BLD VENIPUNCTURE: CPT

## 2023-03-29 PROCEDURE — 85025 COMPLETE CBC W/AUTO DIFF WBC: CPT

## 2023-03-29 PROCEDURE — 82652 VIT D 1 25-DIHYDROXY: CPT

## 2023-03-29 PROCEDURE — 80053 COMPREHEN METABOLIC PANEL: CPT

## 2023-03-29 PROCEDURE — 80061 LIPID PANEL: CPT

## 2023-03-30 LAB
CREATININE URINE: 176.7 MG/DL (ref 39–259)
MICROALBUMIN/CREAT 24H UR: <12 MG/L
MICROALBUMIN/CREAT UR-RTO: NORMAL MCG/MG CREAT

## 2023-03-31 LAB — VITAMIN D 1,25-DIHYDROXY: 31 PG/ML (ref 19.9–79.3)

## 2023-04-04 ENCOUNTER — HOSPITAL ENCOUNTER (OUTPATIENT)
Dept: PHYSICAL THERAPY | Facility: CLINIC | Age: 28
Setting detail: THERAPIES SERIES
Discharge: HOME | End: 2023-04-04
Payer: COMMERCIAL

## 2023-04-04 PROCEDURE — 97750 PHYSICAL PERFORMANCE TEST: CPT

## 2023-05-07 ENCOUNTER — HOSPITAL ENCOUNTER (EMERGENCY)
Age: 28
Discharge: HOME | End: 2023-05-07
Attending: STUDENT IN AN ORGANIZED HEALTH CARE EDUCATION/TRAINING PROGRAM
Payer: COMMERCIAL

## 2023-05-07 VITALS
DIASTOLIC BLOOD PRESSURE: 105 MMHG | SYSTOLIC BLOOD PRESSURE: 169 MMHG | HEART RATE: 110 BPM | OXYGEN SATURATION: 96 % | TEMPERATURE: 97.7 F | RESPIRATION RATE: 18 BRPM | WEIGHT: 280 LBS | BODY MASS INDEX: 37.93 KG/M2 | HEIGHT: 72 IN

## 2023-05-07 DIAGNOSIS — H66.91 RIGHT OTITIS MEDIA, UNSPECIFIED OTITIS MEDIA TYPE: ICD-10-CM

## 2023-05-07 DIAGNOSIS — H61.21 IMPACTED CERUMEN OF RIGHT EAR: Primary | ICD-10-CM

## 2023-05-07 PROCEDURE — 99283 EMERGENCY DEPT VISIT LOW MDM: CPT

## 2023-05-07 RX ORDER — AMOXICILLIN 500 MG/1
500 CAPSULE ORAL 2 TIMES DAILY
Qty: 20 CAPSULE | Refills: 0 | Status: SHIPPED | OUTPATIENT
Start: 2023-05-07 | End: 2023-05-17

## 2023-05-07 ASSESSMENT — ENCOUNTER SYMPTOMS
SHORTNESS OF BREATH: 0
RHINORRHEA: 0
SORE THROAT: 0
TROUBLE SWALLOWING: 0
COLOR CHANGE: 0

## 2023-05-07 ASSESSMENT — PAIN - FUNCTIONAL ASSESSMENT: PAIN_FUNCTIONAL_ASSESSMENT: 0-10

## 2023-05-07 ASSESSMENT — PAIN SCALES - GENERAL: PAINLEVEL_OUTOF10: 3

## 2023-05-08 NOTE — ED NOTES
Flushed R ear several times with luke warm water. Unsuccessful with removing ear wax. Loree Villalobos NP notified.       Melissa Hector RN  05/07/23 5171

## 2023-05-08 NOTE — ED PROVIDER NOTES
[address] ED  eMERGENCY dEPARTMENT eNCOUnter      Pt Name: Odalis Garrett  MRN: [id number]  [place] 1995  Date of evaluation: 5/7/2023  Provider: LAMONTE Gee 2425       Chief Complaint   Patient presents with    Ear Fullness     Pt to ED with right ear fullness since this morning. Asking that we remove the wax         HISTORY OF PRESENT ILLNESS  (Location/Symptom, Timing/Onset, Context/Setting, Quality, Duration, Modifying Factors, Severity.)   Odalis Garrett is a 32 y.o. male who presents to the emergency department. C/o right ear fullness, decreased hearing. He is concerned about a cerumen impaction. Denies fever, chills, injury, drainage. Rates his pain 3/10 at this time. He is requesting ear irrigation. Nursing Notes were reviewed. ALLERGIES     Patient has no known allergies. CURRENT MEDICATIONS       Previous Medications    AMLODIPINE (NORVASC) 10 MG TABLET    Take 1 tablet by mouth daily    ARIPIPRAZOLE ER (ABILIFY MAINTENA) 400 MG SRER    Inject 400 mg into the muscle every 30 days    NICOTINE (NICOTROL) 10 MG INHALER    Inhale 1 puff into the lungs as needed for Smoking cessation       PAST MEDICAL HISTORY         Diagnosis Date    Essential hypertension 3/23/2020    Severe manic bipolar I disorder w/psychotic features, mood-congruent ([address]        SURGICAL HISTORY           Procedure Laterality Date    FRACTURE SURGERY  2016    right hand 2016         FAMILY HISTORY           Problem Relation Age of Onset    High Blood Pressure Mother         pre-htn    High Blood Pressure Father         pre-htn    Breast Cancer Paternal Grandmother     Stroke Paternal Great Grandmother      Family Status   Relation Name Status    Mother  (Not Specified)    Father  (Not Specified)    PGM  (Not Specified)    PGGM  (Not Specified)        SOCIAL HISTORY      reports that he has been smoking cigarettes. He has a 3.00 pack-year smoking history.  He has never used

## 2023-08-03 ENCOUNTER — HOSPITAL ENCOUNTER (EMERGENCY)
Age: 28
Discharge: HOME | End: 2023-08-03
Attending: EMERGENCY MEDICINE
Payer: COMMERCIAL

## 2023-08-03 VITALS
HEIGHT: 72 IN | BODY MASS INDEX: 39.28 KG/M2 | HEART RATE: 79 BPM | RESPIRATION RATE: 14 BRPM | SYSTOLIC BLOOD PRESSURE: 163 MMHG | OXYGEN SATURATION: 96 % | WEIGHT: 290 LBS | DIASTOLIC BLOOD PRESSURE: 111 MMHG | TEMPERATURE: 98.4 F

## 2023-08-03 DIAGNOSIS — H61.23 BILATERAL IMPACTED CERUMEN: Primary | ICD-10-CM

## 2023-08-03 PROCEDURE — 69209 REMOVE IMPACTED EAR WAX UNI: CPT

## 2023-08-03 PROCEDURE — 99283 EMERGENCY DEPT VISIT LOW MDM: CPT

## 2023-08-03 ASSESSMENT — PAIN SCALES - GENERAL: PAINLEVEL_OUTOF10: 4

## 2023-08-03 ASSESSMENT — PAIN - FUNCTIONAL ASSESSMENT: PAIN_FUNCTIONAL_ASSESSMENT: 0-10

## 2023-08-03 ASSESSMENT — ENCOUNTER SYMPTOMS: COUGH: 0

## 2023-08-03 NOTE — ED PROVIDER NOTES
[facility] ED  Emergency Department Encounter  Emergency Medicine Physician Note     Pt Name:Bryant Robles  MRN: [id number]  Birthdate 1995  Date of evaluation: 8/3/23  PCP:  LAMONTE Olvera NP  Note Started: 9:14 AM EDT      CHIEF COMPLAINT       Chief Complaint   Patient presents with    Ear Fullness     Pt states left ear feels plugged, states it needed flushed out about 5 months ago       HISTORY OF PRESENT ILLNESS  (Location/Symptom, Timing/Onset, Context/Setting, Quality, Duration, Modifying Factors, Severity.)      Nicolasa Mercer is a 32 y.o. male who presents with left ear fullness and change in hearing. Patient has concerns for infection. Patient denies any drainage from the ear. Patient denies any fevers or chills. Patient denies any viral-like symptoms. PAST MEDICAL / SURGICAL / SOCIAL / FAMILY HISTORY      has a past medical history of Essential hypertension and Severe manic bipolar I disorder w/psychotic features, mood-congruent ([address]). No additional pertinent        has a past surgical history that includes fracture surgery (2016). No additional pertinent       Social History     Socioeconomic History    Marital status: Single     Spouse name: Not on file    Number of children: Not on file    Years of education: Not on file    Highest education level: Not on file   Occupational History    Not on file   Tobacco Use    Smoking status: Every Day     Packs/day: 0.25     Years: 12.00     Pack years: 3.00     Types: Cigarettes     Last attempt to quit: 12/16/2019     Years since quitting: 3.6    Smokeless tobacco: Never   Vaping Use    Vaping Use: Never used   Substance and Sexual Activity    Alcohol use: Not Currently     Comment: patient reports drinking heavily but states he quit drinking 2 weeks ago -(12/30/19).     Drug use: Not Currently     Types: Marijuana Willian Sida)     Comment: Quit smoking Marijuana two weeks ago - 12/30/19    Sexual activity: Not Currently   Other Topics

## 2024-03-10 ENCOUNTER — APPOINTMENT (OUTPATIENT)
Dept: GENERAL RADIOLOGY | Age: 29
End: 2024-03-10
Payer: COMMERCIAL

## 2024-03-10 ENCOUNTER — HOSPITAL ENCOUNTER (EMERGENCY)
Age: 29
Discharge: HOME | End: 2024-03-10
Attending: EMERGENCY MEDICINE
Payer: COMMERCIAL

## 2024-03-10 VITALS
OXYGEN SATURATION: 97 % | WEIGHT: 300 LBS | RESPIRATION RATE: 18 BRPM | BODY MASS INDEX: 40.63 KG/M2 | SYSTOLIC BLOOD PRESSURE: 141 MMHG | DIASTOLIC BLOOD PRESSURE: 94 MMHG | HEIGHT: 72 IN | TEMPERATURE: 98 F | HEART RATE: 85 BPM

## 2024-03-10 DIAGNOSIS — S46.911A STRAIN OF RIGHT SHOULDER, INITIAL ENCOUNTER: Primary | ICD-10-CM

## 2024-03-10 PROCEDURE — 73030 X-RAY EXAM OF SHOULDER: CPT

## 2024-03-10 PROCEDURE — 99283 EMERGENCY DEPT VISIT LOW MDM: CPT

## 2024-03-10 RX ORDER — NAPROXEN 500 MG/1
500 TABLET ORAL 2 TIMES DAILY PRN
Qty: 14 TABLET | Refills: 0 | Status: SHIPPED | OUTPATIENT
Start: 2024-03-10

## 2024-03-10 ASSESSMENT — PAIN SCALES - GENERAL: PAINLEVEL_OUTOF10: 6

## 2024-03-10 ASSESSMENT — PAIN - FUNCTIONAL ASSESSMENT: PAIN_FUNCTIONAL_ASSESSMENT: 0-10
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
## 2024-03-10 NOTE — DISCHARGE INSTR - COC
DME Yes amt example:189342096}  Last Modified Barium Swallow with Video (Video Swallowing Test): {Done Not Done Date:}    Treatments at the Time of Hospital Discharge:   Respiratory Treatments: ***  Oxygen Therapy:  {Therapy; copd oxygen:66295}  Ventilator:    {[facility] Vent List:136173679}    Rehab Therapies: {THERAPEUTIC INTERVENTION:0460211451}  Weight Bearing Status/Restrictions: {[facility] Weight Bearin}  Other Medical Equipment (for information only, NOT a DME order):  {EQUIPMENT:753767493}  Other Treatments: ***    Patient's personal belongings (please select all that are sent with patient):  {P DME Belongings:922826907}    RN SIGNATURE:  {Esignature:309748890}    CASE MANAGEMENT/SOCIAL WORK SECTION    Inpatient Status Date: ***    Readmission Risk Assessment Score:  Readmission Risk              Risk of Unplanned Readmission:  0           Discharging to Facility/ Agency   Name:   Address:  Phone:  Fax:    Dialysis Facility (if applicable)   Name:  Address:  Dialysis Schedule:  Phone:  Fax:    / signature: {Esignature:843247037}    PHYSICIAN SECTION    Prognosis: {Prognosis:8296947465}    Condition at Discharge: { Patient Condition:976867041}    Rehab Potential (if transferring to Rehab): {Prognosis:9675618624}    Recommended Labs or Other Treatments After Discharge: ***    Physician Certification: I certify the above information and transfer of Bryant Pimentel  is necessary for the continuing treatment of the diagnosis listed and that he requires {Admit to Appropriate Level of Care:62675} for {GREATER/LESS:162757343} 30 days.     Update Admission H&P: {CHP DME Changes in HandP:252138487}    PHYSICIAN SIGNATURE:  {Esignature:238976596}

## 2024-03-15 ENCOUNTER — OFFICE VISIT (OUTPATIENT)
Dept: ORTHOPEDIC SURGERY | Age: 29
End: 2024-03-15
Payer: COMMERCIAL

## 2024-03-15 VITALS — WEIGHT: 295 LBS | HEIGHT: 72 IN | BODY MASS INDEX: 39.96 KG/M2

## 2024-03-15 DIAGNOSIS — M25.511 RIGHT SHOULDER PAIN, UNSPECIFIED CHRONICITY: Primary | ICD-10-CM

## 2024-03-15 DIAGNOSIS — S49.91XA INJURY OF RIGHT SHOULDER, INITIAL ENCOUNTER: Primary | ICD-10-CM

## 2024-03-15 PROCEDURE — G8484 FLU IMMUNIZE NO ADMIN: HCPCS

## 2024-03-15 PROCEDURE — 4004F PT TOBACCO SCREEN RCVD TLK: CPT

## 2024-03-15 PROCEDURE — G8427 DOCREV CUR MEDS BY ELIG CLIN: HCPCS

## 2024-03-15 PROCEDURE — 99203 OFFICE O/P NEW LOW 30 MIN: CPT

## 2024-03-15 PROCEDURE — G8417 CALC BMI ABV UP PARAM F/U: HCPCS

## 2024-03-15 ASSESSMENT — ENCOUNTER SYMPTOMS
VOMITING: 0
NAUSEA: 0
COLOR CHANGE: 0

## 2024-03-15 NOTE — PROGRESS NOTES
[facility]  [address]  [place] [address]  Dept: [phone]  Dept Fax: [phone]        Ambulatory Follow Up      Subjective:   Bryant Pimentel is a 28 y.o. year old male who presents to our office today for routine followup regarding his   1. Injury of right shoulder, initial encounter    .    Chief Complaint   Patient presents with    Joint Pain     RT shoulder pain x's 3 days.        Date of Injury: ***    HPI Bryant Pimentel  is a 28 y.o. {MISC; OT HAND DOMINANCE:5398041729} hand dominant  male who presents today in follow for ***.  The patient was last seen on Visit date not found and underwent treatment in the form of ***.   The patient notes ***% improvement with the previous treatment.       Review of Systems      Objective :   Ht 1.829 m (6')   Wt 133.8 kg (295 lb)   BMI 40.01 kg/m²  Body mass index is 40.01 kg/m².  General: Bryant Pimentel is a 28 y.o. male who is alert and oriented and sitting comfortably in our office.  Ortho Exam  MS:  ***  Neuro: alert and oriented to person and place.   Eyes: Extra-ocular muscles intact  Mouth: Oral mucosa moist. No perioral lesions  Pulm: Respirations unlabored and regular. Symmetric chest excursion without outward deformity is noted.   Skin: warm, well perfused  Psych:   Patient has good fund of knowledge and displays understanging of exam, diagnosis, and plan.    Radiology:     Procedure:    Assessment:      1. Injury of right shoulder, initial encounter       Plan:      ***     Follow up:Return in about 1 month (around 4/15/2024) for right shoulder pain.    Total Time: *** min      Orders Placed This Encounter   Medications    diclofenac sodium (VOLTAREN) 1 % GEL     Sig: Apply 4 g topically 4 times daily     Dispense:  150 g     Refill:  1         No orders of the defined types were placed in this encounter.      This note is created with the assistance of a speech recognition
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
## 2024-05-10 ENCOUNTER — TELEPHONE (OUTPATIENT)
Dept: ORTHOPEDIC SURGERY | Age: 29
End: 2024-05-10

## 2024-05-10 NOTE — TELEPHONE ENCOUNTER
Called patient on 05/10/24 to reschedule appointment. Patient stated that he would call back to reschedule.

## 2024-05-17 ENCOUNTER — OFFICE VISIT (OUTPATIENT)
Dept: ORTHOPEDIC SURGERY | Age: 29
End: 2024-05-17
Payer: COMMERCIAL

## 2024-05-17 VITALS — BODY MASS INDEX: 39.96 KG/M2 | HEIGHT: 72 IN | WEIGHT: 295 LBS

## 2024-05-17 DIAGNOSIS — M25.511 RIGHT SHOULDER PAIN, UNSPECIFIED CHRONICITY: Primary | ICD-10-CM

## 2024-05-17 PROCEDURE — G8417 CALC BMI ABV UP PARAM F/U: HCPCS

## 2024-05-17 PROCEDURE — 99213 OFFICE O/P EST LOW 20 MIN: CPT

## 2024-05-17 PROCEDURE — G8427 DOCREV CUR MEDS BY ELIG CLIN: HCPCS

## 2024-05-17 PROCEDURE — 4004F PT TOBACCO SCREEN RCVD TLK: CPT

## 2024-05-17 ASSESSMENT — ENCOUNTER SYMPTOMS
VOMITING: 0
COLOR CHANGE: 0
NAUSEA: 0

## 2024-05-17 NOTE — PROGRESS NOTES
[facility]  [address]  [facility] [address]  Dept: [phone]  Dept Fax: [phone]        Ambulatory Follow Up      Subjective:   Bryant Pimentel is a 28 y.o. year old male who presents to our office today for routine followup regarding his No diagnosis found..    Chief Complaint   Patient presents with    Follow-up     Rt shoulder pain       Date of Injury: 3/10/24    HPI Bryant Pimentel  is a 28 y.o. Right hand dominant  male who presents today in follow for his right shoulder pain.  The patient was last seen on 3/15/2024 and underwent treatment in the form of topical Voltaren gel, continued use of the prescribed naproxen from the emergency department, and avoiding heavy overhead lifting.  The patient was supposed to see me 1 month after the last visit, but missed his follow-up appointment.  He states that he really is not having pain anymore.  He just notices an occasional twinge of pain along the top of the shoulder.  He has not been taking any pain medications.  He is wanting to get back into working out and is wanting to know what he is able to do.  He is currently not working.      Review of Systems   Constitutional:  Negative for chills and fever.   Gastrointestinal:  Negative for nausea and vomiting.   Musculoskeletal:  Negative for arthralgias and joint swelling.   Skin:  Negative for color change and rash.   Neurological:  Negative for numbness.         Objective :   Ht 1.829 m (6')   Wt 133.8 kg (295 lb)   BMI 40.01 kg/m²  Body mass index is 40.01 kg/m².  General: Bryant Pimentel is a 28 y.o. male who is alert and oriented and sitting comfortably in our office.  Ortho Exam  MS: Examination of the right shoulder demonstrates that the skin is intact and there is no erythema, soft tissue swelling, or ecchymosis.  There is mild tenderness to palpation along the AC joint.  There is also mild tenderness palpation along bicipital

## 2024-06-01 ENCOUNTER — HOSPITAL ENCOUNTER (EMERGENCY)
Age: 29
Discharge: HOME | End: 2024-06-01
Attending: EMERGENCY MEDICINE
Payer: COMMERCIAL

## 2024-06-01 ENCOUNTER — APPOINTMENT (OUTPATIENT)
Dept: GENERAL RADIOLOGY | Age: 29
End: 2024-06-01
Payer: COMMERCIAL

## 2024-06-01 VITALS
BODY MASS INDEX: 37.93 KG/M2 | OXYGEN SATURATION: 97 % | RESPIRATION RATE: 19 BRPM | WEIGHT: 280 LBS | HEART RATE: 85 BPM | SYSTOLIC BLOOD PRESSURE: 150 MMHG | HEIGHT: 72 IN | DIASTOLIC BLOOD PRESSURE: 99 MMHG | TEMPERATURE: 98 F

## 2024-06-01 DIAGNOSIS — R05.1 ACUTE COUGH: Primary | ICD-10-CM

## 2024-06-01 DIAGNOSIS — R07.9 CHEST PAIN, UNSPECIFIED TYPE: ICD-10-CM

## 2024-06-01 LAB
EKG ATRIAL RATE: 73 BPM
EKG P AXIS: 30 DEGREES
EKG P-R INTERVAL: 136 MS
EKG Q-T INTERVAL: 394 MS
EKG QRS DURATION: 84 MS
EKG QTC CALCULATION (BAZETT): 434 MS
EKG R AXIS: 49 DEGREES
EKG T AXIS: 31 DEGREES
EKG VENTRICULAR RATE: 73 BPM

## 2024-06-01 PROCEDURE — 93005 ELECTROCARDIOGRAM TRACING: CPT | Performed by: EMERGENCY MEDICINE

## 2024-06-01 PROCEDURE — 96372 THER/PROPH/DIAG INJ SC/IM: CPT

## 2024-06-01 PROCEDURE — 99284 EMERGENCY DEPT VISIT MOD MDM: CPT

## 2024-06-01 PROCEDURE — 93010 ELECTROCARDIOGRAM REPORT: CPT | Performed by: INTERNAL MEDICINE

## 2024-06-01 PROCEDURE — 6360000002 HC RX W HCPCS: Performed by: EMERGENCY MEDICINE

## 2024-06-01 PROCEDURE — 71045 X-RAY EXAM CHEST 1 VIEW: CPT

## 2024-06-01 RX ORDER — KETOROLAC TROMETHAMINE 30 MG/ML
30 INJECTION, SOLUTION INTRAMUSCULAR; INTRAVENOUS ONCE
Status: COMPLETED | OUTPATIENT
Start: 2024-06-01 | End: 2024-06-01

## 2024-06-01 RX ORDER — IBUPROFEN 600 MG/1
600 TABLET ORAL 3 TIMES DAILY PRN
Qty: 30 TABLET | Refills: 0 | Status: SHIPPED | OUTPATIENT
Start: 2024-06-01

## 2024-06-01 RX ADMIN — KETOROLAC TROMETHAMINE 30 MG: 30 INJECTION, SOLUTION INTRAMUSCULAR at 04:47

## 2024-06-01 ASSESSMENT — ENCOUNTER SYMPTOMS
VOMITING: 0
PHOTOPHOBIA: 0
COUGH: 1
COLOR CHANGE: 0
DIARRHEA: 0
SHORTNESS OF BREATH: 0
TROUBLE SWALLOWING: 0
ABDOMINAL PAIN: 0
NAUSEA: 0

## 2024-06-01 ASSESSMENT — PAIN - FUNCTIONAL ASSESSMENT: PAIN_FUNCTIONAL_ASSESSMENT: 0-10

## 2024-06-01 ASSESSMENT — PAIN SCALES - GENERAL
PAINLEVEL_OUTOF10: 2
PAINLEVEL_OUTOF10: 4
PAINLEVEL_OUTOF10: 5

## 2024-06-01 NOTE — ED PROVIDER NOTES
EMERGENCY DEPARTMENT ENCOUNTER    Pt Name: Bryant Pimentel  MRN: [id number]  Birthdate 1995  Date of evaluation: 6/1/24  CHIEF COMPLAINT       Chief Complaint   Patient presents with    Cough     Started 2-3 days ago. Pt states he has been coughing so hard he now has pain under his right ribs.      HISTORY OF PRESENT ILLNESS   28-year-old male presenting to the ER complaining of right-sided chest pain as well as 3 days of a cough.  Patient states the chest pain started sometime yesterday.  Patient does admit to an underlying history of hypertension.    The history is provided by the patient.   Cough  Cough characteristics:  Non-productive  Associated symptoms: chest pain    Associated symptoms: no ear pain, no fever, no myalgias, no rash and no shortness of breath            REVIEW OF SYSTEMS     Review of Systems   Constitutional:  Negative for activity change, fatigue and fever.   HENT:  Negative for congestion, ear pain and trouble swallowing.    Eyes:  Negative for photophobia and visual disturbance.   Respiratory:  Positive for cough. Negative for shortness of breath.    Cardiovascular:  Positive for chest pain. Negative for palpitations.   Gastrointestinal:  Negative for abdominal pain, diarrhea, nausea and vomiting.   Genitourinary:  Negative for dysuria, flank pain and urgency.   Musculoskeletal:  Negative for arthralgias and myalgias.   Skin:  Negative for color change and rash.   Neurological:  Negative for dizziness and facial asymmetry.   Psychiatric/Behavioral:  Negative for agitation and behavioral problems.      PASTMEDICAL HISTORY     Past Medical History:   Diagnosis Date    Essential hypertension 3/23/2020    Severe manic bipolar I disorder w/psychotic features, mood-congruent ([facility])      Past Problem List  Patient Active Problem List   Diagnosis Code    Severe manic bipolar I disorder w/psychotic features, mood-congruent ([facility]) F31.2    Bipolar 1 disorder ([facility]) F31.9    Hyperopia H52.00    Essential        PROCEDURES:    Procedures      DATA FOR LAB AND RADIOLOGY TESTS ORDERED BELOW ARE REVIEWED BY THE ED CLINICIAN:    RADIOLOGY: All x-rays, CT, MRI, and formal ultrasound images (except ED bedside ultrasound) are read by the radiologist, see reports below, unless otherwise noted in MDM or here.  Reports below are reviewed by myself.  XR CHEST PORTABLE   Final Result   No acute cardiopulmonary process.             LABS: Lab orders shown below, the results are reviewed by myself, and all abnormals are listed below.  Labs Reviewed - No data to display    Vitals Reviewed:    Vitals:    06/01/24 0328   BP: (!) 150/99   Pulse: 85   Resp: 19   Temp: 98 °F (36.7 °C)   TempSrc: Oral   SpO2: 97%   Weight: 127 kg (280 lb)   Height: 1.829 m (6')     MEDICATIONS GIVEN TO PATIENT THIS ENCOUNTER:  Orders Placed This Encounter   Medications    ketorolac (TORADOL) injection 30 mg    ibuprofen (ADVIL;MOTRIN) 600 MG tablet     Sig: Take 1 tablet by mouth 3 times daily as needed for Pain     Dispense:  30 tablet     Refill:  0     DISCHARGE PRESCRIPTIONS:  Discharge Medication List as of 6/1/2024  6:04 AM        START taking these medications    Details   ibuprofen (ADVIL;MOTRIN) 600 MG tablet Take 1 tablet by mouth 3 times daily as needed for Pain, Disp-30 tablet, R-0Normal           PHYSICIAN CONSULTS ORDERED THIS ENCOUNTER:  None  FINAL IMPRESSION      1. Acute cough    2. Chest pain, unspecified type          DISPOSITION/PLAN   DISPOSITION Decision To Discharge 06/01/2024 06:03:30 AM      OUTPATIENT FOLLOW UP THE PATIENT:  Anthony Boo, LAMONTE - NP  [address]  [facility] [address]  [phone]    Schedule an appointment as soon as possible for a visit in 2 days      [facility] ED  [address] [address] [phone]  Go to   As needed, If symptoms worsen      DO Cesilia Juan Sami, DO  06/01/24 5312

## 2024-07-05 ENCOUNTER — HOSPITAL ENCOUNTER (OUTPATIENT)
Age: 29
Discharge: HOME | End: 2024-07-05
Payer: COMMERCIAL

## 2024-07-05 LAB
ALBUMIN SERPL-MCNC: 4.8 G/DL (ref 3.5–5.2)
ALBUMIN/GLOB SERPL: 2 {RATIO} (ref 1–2.5)
ALP SERPL-CCNC: 94 U/L (ref 40–129)
ALT SERPL-CCNC: 59 U/L (ref 10–50)
ANION GAP SERPL CALCULATED.3IONS-SCNC: 12 MMOL/L (ref 9–16)
AST SERPL-CCNC: 42 U/L (ref 10–50)
BASOPHILS # BLD: 0.04 K/UL (ref 0–0.2)
BASOPHILS NFR BLD: 1 % (ref 0–2)
BILIRUB SERPL-MCNC: 1 MG/DL (ref 0–1.2)
BUN SERPL-MCNC: 10 MG/DL (ref 6–20)
CALCIUM SERPL-MCNC: 9.7 MG/DL (ref 8.6–10.4)
CHLORIDE SERPL-SCNC: 106 MMOL/L (ref 98–107)
CHOLEST SERPL-MCNC: 104 MG/DL (ref 0–199)
CHOLESTEROL/HDL RATIO: 3
CO2 SERPL-SCNC: 24 MMOL/L (ref 20–31)
CREAT SERPL-MCNC: 1.2 MG/DL (ref 0.7–1.2)
EOSINOPHIL # BLD: <0.03 K/UL (ref 0–0.44)
EOSINOPHILS RELATIVE PERCENT: 0 % (ref 1–4)
ERYTHROCYTE [DISTWIDTH] IN BLOOD BY AUTOMATED COUNT: 13.4 % (ref 11.8–14.4)
EST. AVERAGE GLUCOSE BLD GHB EST-MCNC: 105 MG/DL
GFR, ESTIMATED: 84 ML/MIN/1.73M2
GLUCOSE SERPL-MCNC: 95 MG/DL (ref 74–99)
HBA1C MFR BLD: 5.3 % (ref 4–6)
HCT VFR BLD AUTO: 47.6 % (ref 40.7–50.3)
HDLC SERPL-MCNC: 30 MG/DL
HGB BLD-MCNC: 15.7 G/DL (ref 13–17)
IMM GRANULOCYTES # BLD AUTO: <0.03 K/UL (ref 0–0.3)
IMM GRANULOCYTES NFR BLD: 0 %
LDLC SERPL CALC-MCNC: 61 MG/DL (ref 0–100)
LYMPHOCYTES NFR BLD: 2.31 K/UL (ref 1.1–3.7)
LYMPHOCYTES RELATIVE PERCENT: 38 % (ref 24–43)
MCH RBC QN AUTO: 29.2 PG (ref 25.2–33.5)
MCHC RBC AUTO-ENTMCNC: 33 G/DL (ref 28.4–34.8)
MCV RBC AUTO: 88.5 FL (ref 82.6–102.9)
MONOCYTES NFR BLD: 0.53 K/UL (ref 0.1–1.2)
MONOCYTES NFR BLD: 9 % (ref 3–12)
NEUTROPHILS NFR BLD: 52 % (ref 36–65)
NEUTS SEG NFR BLD: 3.14 K/UL (ref 1.5–8.1)
NRBC BLD-RTO: 0 PER 100 WBC
PLATELET # BLD AUTO: 232 K/UL (ref 138–453)
PMV BLD AUTO: 11.2 FL (ref 8.1–13.5)
POTASSIUM SERPL-SCNC: 3.8 MMOL/L (ref 3.7–5.3)
PROT SERPL-MCNC: 7.2 G/DL (ref 6.6–8.7)
RBC # BLD AUTO: 5.38 M/UL (ref 4.21–5.77)
SODIUM SERPL-SCNC: 142 MMOL/L (ref 136–145)
TRIGL SERPL-MCNC: 64 MG/DL
TSH SERPL DL<=0.05 MIU/L-ACNC: 1.83 UIU/ML (ref 0.27–4.2)
VLDLC SERPL CALC-MCNC: 13 MG/DL
WBC OTHER # BLD: 6.1 K/UL (ref 3.5–11.3)

## 2024-07-05 PROCEDURE — 83036 HEMOGLOBIN GLYCOSYLATED A1C: CPT

## 2024-07-05 PROCEDURE — 36415 COLL VENOUS BLD VENIPUNCTURE: CPT

## 2024-07-05 PROCEDURE — 85025 COMPLETE CBC W/AUTO DIFF WBC: CPT

## 2024-07-05 PROCEDURE — 84443 ASSAY THYROID STIM HORMONE: CPT

## 2024-07-05 PROCEDURE — 80061 LIPID PANEL: CPT

## 2024-07-05 PROCEDURE — 80053 COMPREHEN METABOLIC PANEL: CPT

## 2024-07-23 ENCOUNTER — OFFICE VISIT (OUTPATIENT)
Dept: NEUROLOGY | Age: 29
End: 2024-07-23
Payer: COMMERCIAL

## 2024-07-23 VITALS
WEIGHT: 256.4 LBS | SYSTOLIC BLOOD PRESSURE: 130 MMHG | DIASTOLIC BLOOD PRESSURE: 86 MMHG | HEIGHT: 72 IN | HEART RATE: 84 BPM | BODY MASS INDEX: 34.73 KG/M2

## 2024-07-23 DIAGNOSIS — R41.3 MEMORY LOSS: ICD-10-CM

## 2024-07-23 DIAGNOSIS — R41.89 PSEUDODEMENTIA: Primary | ICD-10-CM

## 2024-07-23 DIAGNOSIS — F31.9 BIPOLAR 1 DISORDER (HCC): ICD-10-CM

## 2024-07-23 PROCEDURE — 3079F DIAST BP 80-89 MM HG: CPT | Performed by: PSYCHIATRY & NEUROLOGY

## 2024-07-23 PROCEDURE — G8427 DOCREV CUR MEDS BY ELIG CLIN: HCPCS | Performed by: PSYCHIATRY & NEUROLOGY

## 2024-07-23 PROCEDURE — G8417 CALC BMI ABV UP PARAM F/U: HCPCS | Performed by: PSYCHIATRY & NEUROLOGY

## 2024-07-23 PROCEDURE — 4004F PT TOBACCO SCREEN RCVD TLK: CPT | Performed by: PSYCHIATRY & NEUROLOGY

## 2024-07-23 PROCEDURE — 99203 OFFICE O/P NEW LOW 30 MIN: CPT | Performed by: PSYCHIATRY & NEUROLOGY

## 2024-07-23 PROCEDURE — 3075F SYST BP GE 130 - 139MM HG: CPT | Performed by: PSYCHIATRY & NEUROLOGY

## 2024-07-23 RX ORDER — QUETIAPINE 150 MG/1
TABLET, FILM COATED, EXTENDED RELEASE ORAL
COMMUNITY
Start: 2024-07-20

## 2024-07-23 RX ORDER — ATORVASTATIN CALCIUM 20 MG/1
TABLET, FILM COATED ORAL
COMMUNITY
Start: 2024-07-11

## 2024-07-23 RX ORDER — ARIPIPRAZOLE 400 MG
KIT INTRAMUSCULAR
COMMUNITY
Start: 2024-06-11

## 2024-07-23 RX ORDER — LOSARTAN POTASSIUM 50 MG/1
TABLET ORAL
COMMUNITY
Start: 2024-07-05

## 2024-07-23 RX ORDER — METOPROLOL SUCCINATE 50 MG/1
TABLET, EXTENDED RELEASE ORAL
COMMUNITY
Start: 2024-07-11

## 2024-07-23 RX ORDER — LOSARTAN POTASSIUM 25 MG/1
TABLET ORAL
COMMUNITY
Start: 2024-04-15

## 2024-07-23 NOTE — PROGRESS NOTES
NEUROLOGY CONSULT  Patient Name:       Bryant Pimentel  :        1995  Clinic Visit Date:    2024        Dear Anthony Perez, APRN - NP     I had the opportunity to see your patient, Mr. Bryant Pimentel in neurology consultation today.   As you know he  is a 28 y.o.  male presented to clinic accompanied by his parents stating that he has been having memory difficulties for the past several weeks.  Patient has comorbid schizophrenia and patient has been on aripiprazole.  Parents wanted to be evaluated for his ongoing memory difficulties.  His memory difficulties are described as word finding difficulties and patient needed to write everything down.  Patient has difficulty continuing conversations and also has been stuttering at times.  Patient is living with his parents.  Patient was diagnosed with bipolar disorder and schizophrenia about 10 years ago.  Regarding functional status; patient is completely independent of basic and most of the instrumental ADLs.  Patient needs help for cooking.  Patient does not drive.  Patient never had any history of stroke or seizures.  Patient's dad also stated that patient does not interact socially well.  Patient's psychiatrist physician have adjusted his medications and patient is started on Seroquel \"just about 3 days ago\".  Parents also stated that \"we agree, these medications take at least couple of weeks to show any effectiveness\".    All items selected indicate a positive finding.    Those items not selected are negative.  Constitutional [] Weight loss/gain   [] Fatigue  [] Fever/Chills   HEENT [] Hearing Loss  [] Visual Disturbance  [] Tinnitus  [] Eye pain   Respiratory [] Shortness of Breath  [] Cough  [] Snoring   Cardiovascular [] Chest Pain  [] Palpitations  [] Lightheaded   GI [] Constipation  [] Diarrhea  [] Swallowing change    [] Urinary Frequency  [] Urinary Urgency   Musculoskeletal [] Neck pain  [] Back pain  [] Muscle pain  [] Restless legs

## 2024-08-08 ENCOUNTER — HOSPITAL ENCOUNTER (OUTPATIENT)
Dept: MRI IMAGING | Age: 29
Discharge: HOME | End: 2024-08-10
Attending: PSYCHIATRY & NEUROLOGY
Payer: COMMERCIAL

## 2024-08-08 ENCOUNTER — HOSPITAL ENCOUNTER (OUTPATIENT)
Dept: NEUROLOGY | Age: 29
Discharge: HOME | End: 2024-08-08
Attending: PSYCHIATRY & NEUROLOGY
Payer: COMMERCIAL

## 2024-08-08 DIAGNOSIS — R41.3 MEMORY LOSS: ICD-10-CM

## 2024-08-08 PROCEDURE — 70551 MRI BRAIN STEM W/O DYE: CPT

## 2024-08-08 PROCEDURE — 95816 EEG AWAKE AND DROWSY: CPT

## 2024-08-08 NOTE — PROCEDURES
PROCEDURE NOTE  Date: 8/8/2024   Name: Bryant Pimentel  YOB: 1995    Procedures            Date: 8/8/2024  Referring physician: Dr. Pacheco     Indication  Patient aged 28 y with possible seizures. EEG done to assess for epileptiform activity.    Introduction  This routine 20-minute EEG was recorded using the International 10-20 System on a redIT workstation at 256 samples/s. Automated spike and seizure detection algorithms were applied.    Description  During the maximal alert state, a well-regulated, symmetric, and reactive 9 Hz posterior dominant rhythm was seen. No consistent focal slowing or interhemispheric asymmetry was noted. Stage I and stage II sleep were observed. There were no interictal epileptiform discharges or electrographic seizures.    Activations  Hyperventilation was not performed. Intermittent photic stimulation was performed and demonstrated no posterior driving response.    Impression  Normal awake and sleep EEG.       EKG lead did not show clear arrhythmia, if still in concern consider formal EKG or correlation with telemetry.       No epileptiform discharges were identified. Please note the absence of such activity on this record cannot conclusively rule out an epileptic disorder. If such is still clinically suspected, a repeat study with sleep deprivation and/or prolonged sampling may be helpful.    Federico Marshall MD  Epilepsy Board Certified.  Neurology Board Certified.    Electronically Signed

## 2024-10-29 ENCOUNTER — HOSPITAL ENCOUNTER (INPATIENT)
Age: 29
LOS: 6 days | Discharge: HOME | DRG: 750 | End: 2024-11-04
Attending: EMERGENCY MEDICINE | Admitting: PSYCHIATRY & NEUROLOGY
Payer: COMMERCIAL

## 2024-10-29 DIAGNOSIS — I10 ESSENTIAL HYPERTENSION: ICD-10-CM

## 2024-10-29 DIAGNOSIS — F23 ACUTE PSYCHOSIS (HCC): Primary | ICD-10-CM

## 2024-10-29 LAB
ALBUMIN SERPL-MCNC: 4.9 G/DL (ref 3.5–5.2)
ALP SERPL-CCNC: 93 U/L (ref 40–129)
ALT SERPL-CCNC: 56 U/L (ref 10–50)
AMPHET UR QL SCN: NEGATIVE
ANION GAP SERPL CALCULATED.3IONS-SCNC: 12 MMOL/L (ref 9–16)
APAP SERPL-MCNC: <5 UG/ML (ref 10–30)
AST SERPL-CCNC: 95 U/L (ref 10–50)
BARBITURATES UR QL SCN: NEGATIVE
BASOPHILS # BLD: 0 K/UL (ref 0–0.2)
BASOPHILS NFR BLD: 1 % (ref 0–2)
BENZODIAZ UR QL: NEGATIVE
BILIRUB SERPL-MCNC: 1.1 MG/DL (ref 0–1.2)
BUN SERPL-MCNC: 14 MG/DL (ref 6–20)
CALCIUM SERPL-MCNC: 9.8 MG/DL (ref 8.6–10.4)
CANNABINOIDS UR QL SCN: POSITIVE
CHLORIDE SERPL-SCNC: 107 MMOL/L (ref 98–107)
CO2 SERPL-SCNC: 25 MMOL/L (ref 20–31)
COCAINE UR QL SCN: NEGATIVE
CREAT SERPL-MCNC: 1.3 MG/DL (ref 0.7–1.2)
EOSINOPHIL # BLD: 0 K/UL (ref 0–0.4)
EOSINOPHILS RELATIVE PERCENT: 0 % (ref 0–4)
ERYTHROCYTE [DISTWIDTH] IN BLOOD BY AUTOMATED COUNT: 13.6 % (ref 11.5–14.9)
ETHANOL PERCENT: 0 %
ETHANOLAMINE SERPL-MCNC: <10 MG/DL (ref 0–0.08)
FENTANYL UR QL: NEGATIVE
GFR, ESTIMATED: 75 ML/MIN/1.73M2
GLUCOSE SERPL-MCNC: 122 MG/DL (ref 74–99)
HCT VFR BLD AUTO: 46.6 % (ref 41–53)
HGB BLD-MCNC: 15.9 G/DL (ref 13.5–17.5)
LYMPHOCYTES NFR BLD: 1.7 K/UL (ref 1–4.8)
LYMPHOCYTES RELATIVE PERCENT: 21 % (ref 24–44)
MAGNESIUM SERPL-MCNC: 2.2 MG/DL (ref 1.6–2.6)
MCH RBC QN AUTO: 30.2 PG (ref 26–34)
MCHC RBC AUTO-ENTMCNC: 34.1 G/DL (ref 31–37)
MCV RBC AUTO: 88.7 FL (ref 80–100)
METHADONE UR QL: NEGATIVE
MONOCYTES NFR BLD: 0.6 K/UL (ref 0.1–1.3)
MONOCYTES NFR BLD: 7 % (ref 1–7)
NEUTROPHILS NFR BLD: 71 % (ref 36–66)
NEUTS SEG NFR BLD: 6.1 K/UL (ref 1.3–9.1)
OPIATES UR QL SCN: NEGATIVE
OXYCODONE UR QL SCN: NEGATIVE
PCP UR QL SCN: NEGATIVE
PLATELET # BLD AUTO: 254 K/UL (ref 150–450)
PMV BLD AUTO: 7.6 FL (ref 6–12)
POTASSIUM SERPL-SCNC: 3.7 MMOL/L (ref 3.7–5.3)
PROT SERPL-MCNC: 8.1 G/DL (ref 6.6–8.7)
RBC # BLD AUTO: 5.25 M/UL (ref 4.5–5.9)
SALICYLATES SERPL-MCNC: <1 MG/DL (ref 0–10)
SODIUM SERPL-SCNC: 144 MMOL/L (ref 136–145)
TEST INFORMATION: ABNORMAL
WBC OTHER # BLD: 8.5 K/UL (ref 3.5–11)

## 2024-10-29 PROCEDURE — 6360000002 HC RX W HCPCS: Performed by: PSYCHIATRY & NEUROLOGY

## 2024-10-29 PROCEDURE — 99285 EMERGENCY DEPT VISIT HI MDM: CPT

## 2024-10-29 PROCEDURE — 2040000000 HC PSYCH ICU R&B

## 2024-10-29 PROCEDURE — 6360000002 HC RX W HCPCS

## 2024-10-29 PROCEDURE — 80061 LIPID PANEL: CPT

## 2024-10-29 PROCEDURE — 80307 DRUG TEST PRSMV CHEM ANLYZR: CPT

## 2024-10-29 PROCEDURE — G0480 DRUG TEST DEF 1-7 CLASSES: HCPCS

## 2024-10-29 PROCEDURE — 6370000000 HC RX 637 (ALT 250 FOR IP): Performed by: PSYCHIATRY & NEUROLOGY

## 2024-10-29 PROCEDURE — 83036 HEMOGLOBIN GLYCOSYLATED A1C: CPT

## 2024-10-29 PROCEDURE — 80179 DRUG ASSAY SALICYLATE: CPT

## 2024-10-29 PROCEDURE — 36415 COLL VENOUS BLD VENIPUNCTURE: CPT

## 2024-10-29 PROCEDURE — 80053 COMPREHEN METABOLIC PANEL: CPT

## 2024-10-29 PROCEDURE — 83735 ASSAY OF MAGNESIUM: CPT

## 2024-10-29 PROCEDURE — 85025 COMPLETE CBC W/AUTO DIFF WBC: CPT

## 2024-10-29 PROCEDURE — 80143 DRUG ASSAY ACETAMINOPHEN: CPT

## 2024-10-29 RX ORDER — IBUPROFEN 400 MG/1
400 TABLET, FILM COATED ORAL EVERY 6 HOURS PRN
Status: DISCONTINUED | OUTPATIENT
Start: 2024-10-29 | End: 2024-11-04 | Stop reason: HOSPADM

## 2024-10-29 RX ORDER — POLYETHYLENE GLYCOL 3350 17 G/17G
17 POWDER, FOR SOLUTION ORAL DAILY PRN
Status: DISCONTINUED | OUTPATIENT
Start: 2024-10-29 | End: 2024-11-04 | Stop reason: HOSPADM

## 2024-10-29 RX ORDER — HALOPERIDOL 5 MG/ML
5 INJECTION INTRAMUSCULAR EVERY 6 HOURS PRN
Status: DISCONTINUED | OUTPATIENT
Start: 2024-10-29 | End: 2024-10-30

## 2024-10-29 RX ORDER — POLYETHYLENE GLYCOL 3350 17 G
2 POWDER IN PACKET (EA) ORAL
Status: DISCONTINUED | OUTPATIENT
Start: 2024-10-29 | End: 2024-11-01

## 2024-10-29 RX ORDER — HYDROXYZINE HYDROCHLORIDE 50 MG/1
50 TABLET, FILM COATED ORAL 3 TIMES DAILY PRN
Status: DISCONTINUED | OUTPATIENT
Start: 2024-10-29 | End: 2024-11-04 | Stop reason: HOSPADM

## 2024-10-29 RX ORDER — HALOPERIDOL 5 MG/1
5 TABLET ORAL EVERY 6 HOURS PRN
Status: DISCONTINUED | OUTPATIENT
Start: 2024-10-29 | End: 2024-10-30

## 2024-10-29 RX ORDER — CHLORPROMAZINE HCI 25 MG/ML
INJECTION INTRAMUSCULAR
Status: COMPLETED
Start: 2024-10-29 | End: 2024-10-29

## 2024-10-29 RX ORDER — CHLORPROMAZINE HCI 25 MG/ML
100 INJECTION INTRAMUSCULAR ONCE
Status: COMPLETED | OUTPATIENT
Start: 2024-10-29 | End: 2024-10-29

## 2024-10-29 RX ORDER — ACETAMINOPHEN 325 MG/1
650 TABLET ORAL EVERY 6 HOURS PRN
Status: DISCONTINUED | OUTPATIENT
Start: 2024-10-29 | End: 2024-11-04 | Stop reason: HOSPADM

## 2024-10-29 RX ORDER — DIPHENHYDRAMINE HYDROCHLORIDE 50 MG/ML
50 INJECTION INTRAMUSCULAR; INTRAVENOUS EVERY 6 HOURS PRN
Status: DISCONTINUED | OUTPATIENT
Start: 2024-10-29 | End: 2024-10-30

## 2024-10-29 RX ORDER — TRAZODONE HYDROCHLORIDE 50 MG/1
50 TABLET, FILM COATED ORAL NIGHTLY PRN
Status: DISCONTINUED | OUTPATIENT
Start: 2024-10-29 | End: 2024-11-04 | Stop reason: HOSPADM

## 2024-10-29 RX ORDER — MAGNESIUM HYDROXIDE/ALUMINUM HYDROXICE/SIMETHICONE 120; 1200; 1200 MG/30ML; MG/30ML; MG/30ML
30 SUSPENSION ORAL EVERY 6 HOURS PRN
Status: DISCONTINUED | OUTPATIENT
Start: 2024-10-29 | End: 2024-11-04 | Stop reason: HOSPADM

## 2024-10-29 RX ADMIN — CHLORPROMAZINE HCI 100 MG: 25 INJECTION INTRAMUSCULAR at 19:59

## 2024-10-29 RX ADMIN — HALOPERIDOL LACTATE 5 MG: 5 INJECTION, SOLUTION INTRAMUSCULAR at 18:46

## 2024-10-29 RX ADMIN — DIPHENHYDRAMINE HYDROCHLORIDE 50 MG: 50 INJECTION INTRAMUSCULAR; INTRAVENOUS at 18:46

## 2024-10-29 RX ADMIN — HYDROXYZINE HYDROCHLORIDE 50 MG: 50 TABLET, FILM COATED ORAL at 21:00

## 2024-10-29 RX ADMIN — TRAZODONE HYDROCHLORIDE 50 MG: 50 TABLET ORAL at 21:00

## 2024-10-29 RX ADMIN — CHLORPROMAZINE HYDROCHLORIDE 100 MG: 25 INJECTION INTRAMUSCULAR at 19:59

## 2024-10-29 ASSESSMENT — PATIENT HEALTH QUESTIONNAIRE - PHQ9
2. FEELING DOWN, DEPRESSED OR HOPELESS: SEVERAL DAYS
SUM OF ALL RESPONSES TO PHQ QUESTIONS 1-9: 2
1. LITTLE INTEREST OR PLEASURE IN DOING THINGS: SEVERAL DAYS
SUM OF ALL RESPONSES TO PHQ QUESTIONS 1-9: 2
SUM OF ALL RESPONSES TO PHQ9 QUESTIONS 1 & 2: 2
SUM OF ALL RESPONSES TO PHQ QUESTIONS 1-9: 2
SUM OF ALL RESPONSES TO PHQ QUESTIONS 1-9: 2

## 2024-10-29 ASSESSMENT — LIFESTYLE VARIABLES
HOW OFTEN DO YOU HAVE A DRINK CONTAINING ALCOHOL: NEVER
HOW OFTEN DO YOU HAVE A DRINK CONTAINING ALCOHOL: NEVER
HOW MANY STANDARD DRINKS CONTAINING ALCOHOL DO YOU HAVE ON A TYPICAL DAY: PATIENT DOES NOT DRINK
HOW MANY STANDARD DRINKS CONTAINING ALCOHOL DO YOU HAVE ON A TYPICAL DAY: PATIENT DOES NOT DRINK

## 2024-10-29 ASSESSMENT — SLEEP AND FATIGUE QUESTIONNAIRES
SLEEP PATTERN: DIFFICULTY FALLING ASLEEP;DISTURBED/INTERRUPTED SLEEP;INSOMNIA
DO YOU HAVE DIFFICULTY SLEEPING: YES
DO YOU USE A SLEEP AID: YES
AVERAGE NUMBER OF SLEEP HOURS: 0

## 2024-10-29 NOTE — ED NOTES
Report given to MARGARET Delgado from maritza batista.   Report method by phone   The following was reviewed with receiving RN:   Current vital signs:  BP (!) 140/75   Pulse 96   Temp 97.6 °F (36.4 °C) (Oral)   Resp 15   SpO2 99%                      Any medication or safety alerts were reviewed. Any pending diagnostics and notifications were also reviewed, as well as any safety concerns or issues, abnormal labs, abnormal imaging, and abnormal assessment findings. Questions were answered.

## 2024-10-29 NOTE — ED NOTES
Safeguard in [facility] for patient watch. Safeguard informed that they need to stay with the patient at all time, must be present in the room and if they need a break or relief to let the nurse know so they can be replaced. Safeguard verbalizes understanding. Belongings and patient checked by security. Belongings locked up. Pt in blue gown.

## 2024-10-29 NOTE — ED PROVIDER NOTES
Behavior: Behavior is slowed and withdrawn.         Thought Content: Thought content is paranoid. Thought content does not include homicidal or suicidal ideation.         MEDICAL DECISION MAKING / ED COURSE:     29-year-old male presents for mental health evaluation.  On exam patient in no acute distress, vitals are stable    1)  Number and Complexity of Problems Addressed at this Encounter  Problem List This Visit: Mental health evaluation    Differential Diagnosis: Psychosis, paranoia    Diagnoses Considered but Do Not Suspect: Suicidal ideation, homicidal ideation    Pertinent Comorbid Conditions: Bipolar          3)  Treatment and Disposition             Will check labs, social work to evaluate    Labs reviewed and unremarkable    Due to concerns for worsening paranoia, concern for psychosis, patient not taking care of self, not sleeping, feel he would benefit from inpatient admission, patient is on a pink slip, medically cleared, accepted by psychiatry for admission        CRITICAL CARE:       PROCEDURES:    Procedures      DATA FOR LAB AND RADIOLOGY TESTS ORDERED BELOW ARE REVIEWED BY THE ED CLINICIAN:    RADIOLOGY: All x-rays, CT, MRI, and formal ultrasound images (except ED bedside ultrasound) are read by the radiologist, see reports below, unless otherwise noted in MDM or here.  Reports below are reviewed by myself.  No orders to display       LABS: Lab orders shown below, the results are reviewed by myself, and all abnormals are listed below.  Labs Reviewed   CBC WITH AUTO DIFFERENTIAL - Abnormal; Notable for the following components:       Result Value    Neutrophils % 71 (*)     Lymphocytes % 21 (*)     All other components within normal limits   COMPREHENSIVE METABOLIC PANEL - Abnormal; Notable for the following components:    Glucose 122 (*)     Creatinine 1.3 (*)     ALT 56 (*)     AST 95 (*)     All other components within normal limits   ACETAMINOPHEN LEVEL - Abnormal; Notable for the following

## 2024-10-29 NOTE — ED TRIAGE NOTES
Pt being sexually inappropriate and masturbating in front of writer. Pt unable to be verbally redirected to restroom for privacy. Security aware. While pt is touching self staff from [facility] came to transport pt to floor. Security called to escort. [facility] staff explained to pt that this can be done privately in room and he is unable to do this in front of staff and other pts. Pt agreed to stop till in a private area

## 2024-10-29 NOTE — ED NOTES
The following labs labeled with pt sticker and tubed to lab:     [] Blue     [x] Lavender   [] on ice  [x] Green/yellow  [] Green/black [] on ice  [] Yellow  [] Red  [] Pink      [] COVID-19 swab    [] Rapid  [] PCR  [] Flu swab  [] Peds Viral Panel     [x] Urine Sample  [] Pelvic Cultures  [] Blood Cultures

## 2024-10-29 NOTE — ED NOTES
Provisional Diagnosis:   Acute psychosis     Psychosocial and Contextual Factors:   Patient reportedly has been decompensating significantly for the last week. Patient has been losing touch with reality and having paranoid thoughts that he is not safe.    C-SSRS Summary:      Patient: X  Family: Patients mother and father   Agency: [facility]    Substance Abuse: Patient reportedly used mariajuana 1 week ago.     Present Suicidal Behavior:  Patient denies.    Verbal:     Attempt:    Past Suicidal Behavior: Patient denies.    Verbal:    Attempt:      Self-Injurious/Self-Mutilation:Patient denies.      Violence Current or Past: Patient is calm and cooperative, per family patient has no history of violence.       Trauma Identified:  Patient denies    Protective Factors:    Patient has has poor insight and judgment upon arrival.       Clinical Summary:    Bryant Pimentel is a 29 y.o. male who presents to the ED on an application for emergency admission by [facility] team. Prior to arrival, patient reportedly got out of his fathers car at random, and began wandering near a busy street, and times laying down, and not verbally responding to his father, then made a comment that he \"needs to go to heaven.\"    Patient upon arrival has poverty of speech. Patient is afraid to fall sleep as he believes he will wake up in a different reality, and that he will be \"stuck in a dream\"   Patient not able to have any significant conversation with writer and ED physician and appears to has significant thought blocking.     Patient has reportedly slept about a total of 10 hours since last Wednesday.   Patient states he feels like something bad is going to happen. Patient holding onto his necklace that is a cross stating he believes it is protecting him. Patients mother states it wasn't even a real necklace is was a part of a card given to her two weeks ago, that patient now will not let go of.     Per mother patient has has been

## 2024-10-30 PROCEDURE — 99222 1ST HOSP IP/OBS MODERATE 55: CPT | Performed by: INTERNAL MEDICINE

## 2024-10-30 PROCEDURE — 99222 1ST HOSP IP/OBS MODERATE 55: CPT | Performed by: PSYCHIATRY & NEUROLOGY

## 2024-10-30 PROCEDURE — 2040000000 HC PSYCH ICU R&B

## 2024-10-30 PROCEDURE — 6370000000 HC RX 637 (ALT 250 FOR IP): Performed by: PSYCHIATRY & NEUROLOGY

## 2024-10-30 PROCEDURE — 6360000002 HC RX W HCPCS: Performed by: PSYCHIATRY & NEUROLOGY

## 2024-10-30 PROCEDURE — APPSS60 APP SPLIT SHARED TIME 46-60 MINUTES: Performed by: NURSE PRACTITIONER

## 2024-10-30 RX ORDER — RISPERIDONE 1 MG/1
0.5 TABLET ORAL 2 TIMES DAILY
Status: DISCONTINUED | OUTPATIENT
Start: 2024-10-30 | End: 2024-11-04 | Stop reason: HOSPADM

## 2024-10-30 RX ORDER — HALOPERIDOL 5 MG/1
5 TABLET ORAL EVERY 6 HOURS PRN
Status: DISCONTINUED | OUTPATIENT
Start: 2024-10-30 | End: 2024-11-04 | Stop reason: HOSPADM

## 2024-10-30 RX ORDER — LORAZEPAM 2 MG/ML
2 INJECTION INTRAMUSCULAR ONCE
Status: COMPLETED | OUTPATIENT
Start: 2024-10-30 | End: 2024-10-30

## 2024-10-30 RX ORDER — LOSARTAN POTASSIUM 50 MG/1
50 TABLET ORAL DAILY
Status: DISCONTINUED | OUTPATIENT
Start: 2024-10-30 | End: 2024-11-04 | Stop reason: HOSPADM

## 2024-10-30 RX ORDER — DIPHENHYDRAMINE HYDROCHLORIDE 50 MG/ML
50 INJECTION INTRAMUSCULAR; INTRAVENOUS EVERY 6 HOURS PRN
Status: DISCONTINUED | OUTPATIENT
Start: 2024-10-30 | End: 2024-11-04 | Stop reason: HOSPADM

## 2024-10-30 RX ORDER — LORAZEPAM 2 MG/ML
2 INJECTION INTRAMUSCULAR EVERY 6 HOURS PRN
Status: DISCONTINUED | OUTPATIENT
Start: 2024-10-30 | End: 2024-11-04 | Stop reason: HOSPADM

## 2024-10-30 RX ORDER — HALOPERIDOL 5 MG/ML
5 INJECTION INTRAMUSCULAR EVERY 6 HOURS PRN
Status: DISCONTINUED | OUTPATIENT
Start: 2024-10-30 | End: 2024-11-04 | Stop reason: HOSPADM

## 2024-10-30 RX ORDER — LORAZEPAM 1 MG/1
2 TABLET ORAL EVERY 6 HOURS PRN
Status: DISCONTINUED | OUTPATIENT
Start: 2024-10-30 | End: 2024-11-04 | Stop reason: HOSPADM

## 2024-10-30 RX ORDER — METOPROLOL SUCCINATE 25 MG/1
50 TABLET, EXTENDED RELEASE ORAL DAILY
Status: DISCONTINUED | OUTPATIENT
Start: 2024-10-30 | End: 2024-11-02

## 2024-10-30 RX ORDER — HALOPERIDOL 5 MG/ML
5 INJECTION INTRAMUSCULAR ONCE
Status: COMPLETED | OUTPATIENT
Start: 2024-10-30 | End: 2024-10-30

## 2024-10-30 RX ORDER — ATORVASTATIN CALCIUM 20 MG/1
20 TABLET, FILM COATED ORAL NIGHTLY
Status: DISCONTINUED | OUTPATIENT
Start: 2024-10-30 | End: 2024-11-04 | Stop reason: HOSPADM

## 2024-10-30 RX ORDER — DIPHENHYDRAMINE HYDROCHLORIDE 50 MG/ML
50 INJECTION INTRAMUSCULAR; INTRAVENOUS ONCE
Status: COMPLETED | OUTPATIENT
Start: 2024-10-30 | End: 2024-10-30

## 2024-10-30 RX ADMIN — NICOTINE POLACRILEX 2 MG: 2 LOZENGE ORAL at 17:07

## 2024-10-30 RX ADMIN — HALOPERIDOL LACTATE 5 MG: 5 INJECTION, SOLUTION INTRAMUSCULAR at 04:45

## 2024-10-30 RX ADMIN — LORAZEPAM 2 MG: 2 INJECTION INTRAMUSCULAR; INTRAVENOUS at 04:47

## 2024-10-30 RX ADMIN — DIPHENHYDRAMINE HYDROCHLORIDE 50 MG: 50 INJECTION INTRAMUSCULAR; INTRAVENOUS at 02:49

## 2024-10-30 RX ADMIN — DIPHENHYDRAMINE HYDROCHLORIDE 50 MG: 50 INJECTION INTRAMUSCULAR; INTRAVENOUS at 04:46

## 2024-10-30 RX ADMIN — HALOPERIDOL LACTATE 5 MG: 5 INJECTION, SOLUTION INTRAMUSCULAR at 02:49

## 2024-10-30 RX ADMIN — NICOTINE POLACRILEX 2 MG: 2 LOZENGE ORAL at 19:27

## 2024-10-30 ASSESSMENT — LIFESTYLE VARIABLES
HOW MANY STANDARD DRINKS CONTAINING ALCOHOL DO YOU HAVE ON A TYPICAL DAY: PATIENT DOES NOT DRINK
HOW OFTEN DO YOU HAVE A DRINK CONTAINING ALCOHOL: NEVER

## 2024-10-30 NOTE — GROUP NOTE
Group Therapy Note    Date: 10/30/2024    Group Start Time: 0845  Group End Time: 0855  Group Topic: Community Meeting    [facility]    Kirill Cortés    Community Meeting Group Note        Date: 10/30/2024 Start Time: 0845  End Time: 0855      Number of Participants in Group & Unit Census:  4/6    Topic: [place] to unit and provide education to unit schedule    Goal of Group: Set a daily goal      Comments:     Patient did not participate in Community Meeting group, despite staff encouragement and explanation of benefits.  Patient remain seclusive to self.  Q15 minute safety checks maintained for patient safety and will continue to encourage patient to attend unit programming.

## 2024-10-30 NOTE — PLAN OF CARE
Problem: Risk for Elopement  Goal: Patient will not exit the unit/facility without proper excort  10/30/2024 1416 by Staci Ta, RN  Outcome: Progressing     Problem: Anxiety  Goal: Will report anxiety at manageable levels  Description: INTERVENTIONS:  1. Administer medication as ordered  2. Teach and rehearse alternative coping skills  3. Provide emotional support with 1:1 interaction with staff  10/30/2024 1416 by Staci Ta, RN  Outcome: Progressing     Problem: Depression/Self Harm  Goal: Effect of psychiatric condition will be minimized and patient will be protected from self harm  Description: INTERVENTIONS:  1. Assess impact of patient's symptoms on level of functioning, self care needs and offer support as indicated  2. Assess patient/family knowledge of depression, impact on illness and need for teaching  3. Provide emotional support, presence and reassurance  4. Assess for possible suicidal thoughts or ideation. If patient expresses suicidal thoughts or statements do not leave alone, initiate Suicide Precautions, move to a room close to the nursing station and obtain sitter  5. Initiate consults as appropriate with Mental Health Professional, Spiritual Care, Psychosocial CNS, and consider a recommendation to the LIP for a Psychiatric Consultation  Outcome: Progressing     Problem: Psychosis  Goal: Will report no hallucinations or delusions  Description: INTERVENTIONS:  1. Administer medication as  ordered  2. Assist with reality testing to support increasing orientation  3. Assess if patient's hallucinations or delusions are encouraging self harm or harm to others and intervene as appropriate  Outcome: Progressing       Patient is seen in the day room alert. Patient ask \"what's depression or anxiety\", patient stated he has some depression unable to rate on a scale, coping skills were explored. Patient reports adequate intake and rest, Patient appears to be thought blocking pausing for long

## 2024-10-30 NOTE — GROUP NOTE
Group Therapy Note    Date: 10/30/2024    Group Start Time: 1100  Group End Time: 1200  Group Topic: Music Therapy    [facility]    Kirill Cortés    Music Therapy Group Note        Date: 10/30/2024   Start Time: 1100  End Time: 1200      Number of Participants in Group & Unit Census:  2/6    Topic: Patients shared music and asked to analyze lyrics for themes, and share advice based on themes within their song selections.     Goal of Group: Patients goals to increase sense of community; Increase socialization; Engage in peer support; Increase self-expression; Increase self-confidence;       Comments:     Patient did not participate in Music Therapy group, despite staff encouragement and explanation of benefits.  Patient remain seclusive to self.  Q15 minute safety checks maintained for patient safety and will continue to encourage patient to attend unit programming.        dizziness

## 2024-10-30 NOTE — PLAN OF CARE
Problem: Risk for Elopement  Goal: Patient will not exit the unit/facility without proper excort  Outcome: Progressing     Problem: Anxiety  Goal: Will report anxiety at manageable levels  Description: INTERVENTIONS:  1. Administer medication as ordered  2. Teach and rehearse alternative coping skills  3. Provide emotional support with 1:1 interaction with staff  Outcome: Progressing   Exit seeking continue  agitation and aggression continue  multiple emergency medications needed  as well as  multiple seclusions

## 2024-10-30 NOTE — H&P
aches, swelling of joints  NEUROLOGICAL:  negative for headaches, dizziness, lightheadedness, numbness, pain, tingling extremities  BEHAVIOR/PSYCH:  depressed     Physical Exam:     /87   Pulse (!) 104   Temp 97.8 °F (36.6 °C) (Oral)   Resp 16   Ht 1.829 m (6')   Wt 104.3 kg (230 lb)   SpO2 100%   BMI 31.19 kg/m²   Temp (24hrs), Av.1 °F (36.7 °C), Min:97.8 °F (36.6 °C), Max:98.6 °F (37 °C)    No results for input(s): \"POCGLU\" in the last 72 hours.  No intake or output data in the 24 hours ending 10/30/24 1901    General Appearance:  alert, well appearing, and in no acute distress, central obesity present   Mental status: oriented to person, place, and time with normal affect  Head:  normocephalic, atraumatic.  Eye: no icterus, redness, pupils equal and reactive, extraocular eye movements intact, conjunctiva clear  Ear: normal external ear, no discharge, hearing intact  Nose:  no drainage noted  Mouth: mucous membranes moist  Neck: supple, no carotid bruits, thyroid not palpable  Lungs: Bilateral equal air entry, clear to ausculation, no wheezing, rales or rhonchi, normal effort  Cardiovascular: normal rate, regular rhythm, no murmur, gallop, rub.  Abdomen: Soft, nontender, nondistended, normal bowel sounds, no hepatomegaly or splenomegaly  Neurologic: There are no new focal motor or sensory deficits, normal muscle tone and bulk, no abnormal sensation, normal speech, cranial nerves II through XII grossly intact  Skin: No gross lesions, rashes, bruising or bleeding on exposed skin area  Extremities:  peripheral pulses palpable, no pedal edema or calf pain with palpation  Psych:     Investigations:      Laboratory Testing:  No results found for this or any previous visit (from the past 24 hour(s)).    Consultations:   IP CONSULT TO INTERNAL MEDICINE    Assessment :      Primary Problem  Acute psychosis (HCC)    Active Hospital Problems    Diagnosis Date Noted    Acute psychosis (HCC) [F23] 10/29/2024 
  HENT: Negative for ear pain and nosebleeds.    Eyes: Negative for blurred vision and photophobia.   Respiratory: Negative for cough, shortness of breath and wheezing.    Cardiovascular: Negative for chest pain and palpitations.   Gastrointestinal: Negative for abdominal pain, diarrhea and vomiting.   Genitourinary: Negative for dysuria and urgency.   Musculoskeletal: Negative for falls and joint pain.   Skin: Negative for itching and rash.   Neurological: Negative for tremors, seizures and weakness.   Endo/Heme/Allergies: Does not bruise/bleed easily.      Mental Status Examination:    Level of consciousness: Somnolent/sedated  Appearance:  Appropriate attire, resting in bed, poor grooming   Behavior/Motor: Approachable, unable to engage with interviewer, calm  Attitude toward examiner: Uncooperative, inattentive, no eye contact  Speech: Patient does not speak  Mood: Constricted  Affect: Blunted  Thought processes: Unable to assess due to patient's inability to participate in assessment  Thought content: Denies suicidal ideations during recent assessment              Denies homicidal ideations during recent assessment              Denies hallucinations; recently observed to be responding to internal stimuli              Recently exhibited delusions              Recently exhibited paranoia  Cognition:  Unable to assess due to patient's inability to participate in assessment  Concentration: impaired  Memory: Unable to assess due to patient's inability to participate in assessment  Insight & Judgment: Poor         DSM-5 Diagnosis    Principal Problem: Acute psychosis (HCC)  Cannabis use disorder    History of bipolar 1 disorder  Rule out schizoaffective disorder, bipolar type    Psychosocial and Contextual factors:  Financial   Occupational   Relationship   Legal   Living situation   Educational     Past Medical History:   Diagnosis Date    Essential hypertension 3/23/2020    Severe manic bipolar I disorder

## 2024-10-31 LAB
CHOLEST SERPL-MCNC: 145 MG/DL (ref 0–199)
CHOLESTEROL/HDL RATIO: 3.3
EST. AVERAGE GLUCOSE BLD GHB EST-MCNC: 103 MG/DL
HBA1C MFR BLD: 5.2 % (ref 4–6)
HDLC SERPL-MCNC: 44 MG/DL
LDLC SERPL CALC-MCNC: 91 MG/DL (ref 0–100)
TRIGL SERPL-MCNC: 52 MG/DL (ref 0–149)

## 2024-10-31 PROCEDURE — 2040000000 HC PSYCH ICU R&B

## 2024-10-31 PROCEDURE — APPSS30 APP SPLIT SHARED TIME 16-30 MINUTES: Performed by: NURSE PRACTITIONER

## 2024-10-31 PROCEDURE — 99232 SBSQ HOSP IP/OBS MODERATE 35: CPT | Performed by: INTERNAL MEDICINE

## 2024-10-31 PROCEDURE — 99232 SBSQ HOSP IP/OBS MODERATE 35: CPT | Performed by: PSYCHIATRY & NEUROLOGY

## 2024-10-31 RX ORDER — AMLODIPINE BESYLATE 10 MG/1
10 TABLET ORAL DAILY
Status: DISCONTINUED | OUTPATIENT
Start: 2024-10-31 | End: 2024-11-04 | Stop reason: HOSPADM

## 2024-10-31 NOTE — GROUP NOTE
Group Therapy Note    Date: 10/31/2024    Group Start Time: 1110  Group End Time: 1200  Group Topic: Psychoeducation    [facility] Kirill Silva        Group Therapy Note    Attendees: 4/6       Patient's Goal:  Patients given journals, and info sheets about positive effects of journaling and a variety of prompts. Engaged in education on a variety of styles of journaling, organization, and therapeutic effects of journaling. Provided art materials to enhance their journals and make designs as well. Goals to increase self-expression; Increase understanding of potential therapeutic effects of journaling; Identify one type of journaling method they would be willing to explore;     Notes:  Patient attended and participated in group, taking notes about discussion of journaling, and asking clarifying questions and engaging appropriately in discussion. Pleasant and engaging throughout    Status After Intervention:  Improved    Participation Level: Active Listener and Interactive    Participation Quality: Appropriate, Attentive, and Sharing      Speech:  normal      Thought Process/Content: Logical  Linear      Affective Functioning: Congruent      Mood: euthymic      Level of consciousness:  Alert and Attentive      Response to Learning: Able to verbalize current knowledge/experience and Progressing to goal      Endings: None Reported    Modes of Intervention: Support, Socialization, Exploration, Activity, Media, and Reality-testing      Discipline Responsible: Psychoeducational Specialist      Signature:  Kirill Cortés

## 2024-10-31 NOTE — GROUP NOTE
Group Therapy Note    Date: 10/31/2024    Group Start Time: 1015  Group End Time: 1045  Group Topic: Psychoeducation    ARINA BHYumiko Mccann MSW, LSW        Group Therapy Note    Attendees: 4/6       Patient's Goal:  Expression of feeling    Notes:  Therapeutic worksheets provided and discussed. Patient arrived to group late and was observed responding to internal stimuli.    Status After Intervention:  Unchanged    Participation Level: Minimal    Participation Quality: Resistant      Speech:  hesitant      Thought Process/Content: Flight of ideas      Affective Functioning: Incongruent      Mood: euthymic      Level of consciousness:  Preoccupied      Response to Learning: Progressing to goal      Endings: None Reported    Modes of Intervention: Education and Support      Discipline Responsible: /Counselor      Signature:  MABLE Heart, AMY

## 2024-10-31 NOTE — GROUP NOTE
Group Therapy Note    Date: 10/31/2024    Group Start Time: 1430  Group End Time: 1520  Group Topic: Music Therapy    [facility] Kirill Luis        Group Therapy Note    Attendees: 2/4       Patient's Goal:  Patients shared songs to dedicate to an important person in their life, answering questions about these people and relationships as asked by this writer based on their music/sharing. Patient goals to increase self-expression; Increase sense of community; Reflect on relationships; Increase rapport with staff; Demonstrate positive use of time     Notes:  Patient attended and participated in group having positive interactions with peers and staff. Patient was pleasant and engaging, supportive of peers sharing, and asking if she had a song she liked. Patient shared multiple songs, dedicating some to himself, a former romantic partner, and his parents.       Status After Intervention:  Improved    Participation Level: Active Listener and Interactive    Participation Quality: Appropriate, Attentive, Sharing, and Supportive      Speech:  normal      Thought Process/Content: Logical  Linear      Affective Functioning: Congruent      Mood: euthymic      Level of consciousness:  Alert and Attentive      Response to Learning: Able to verbalize current knowledge/experience and Progressing to goal      Endings: None Reported    Modes of Intervention: Support, Socialization, Exploration, Activity, Media, and Reality-testing      Discipline Responsible: Psychoeducational Specialist      Signature:  Kirill Cortés

## 2024-10-31 NOTE — PLAN OF CARE
Problem: Risk for Elopement  Goal: Patient will not exit the unit/facility without proper excort  10/31/2024 1138 by Staci Ta, RN  Outcome: Progressing     Problem: Anxiety  Goal: Will report anxiety at manageable levels  Description: INTERVENTIONS:  1. Administer medication as ordered  2. Teach and rehearse alternative coping skills  3. Provide emotional support with 1:1 interaction with staff  10/31/2024 1138 by Staci Ta RN  Outcome: Progressing     Problem: Depression/Self Harm  Goal: Effect of psychiatric condition will be minimized and patient will be protected from self harm  Description: INTERVENTIONS:  1. Assess impact of patient's symptoms on level of functioning, self care needs and offer support as indicated  2. Assess patient/family knowledge of depression, impact on illness and need for teaching  3. Provide emotional support, presence and reassurance  4. Assess for possible suicidal thoughts or ideation. If patient expresses suicidal thoughts or statements do not leave alone, initiate Suicide Precautions, move to a room close to the nursing station and obtain sitter  5. Initiate consults as appropriate with Mental Health Professional, Spiritual Care, Psychosocial CNS, and consider a recommendation to the LIP for a Psychiatric Consultation  Outcome: Progressing       Patient has been visibile on the unit attending groups. Patient refused scheduled medications and denies any homicidal or suicidal ideation. Patient denies any olfactory, visual or tactile disturbances. Patient reports adequate intake and rest. Patient has been complaint with daily ADLs and showered. Patient maintains Q 15 minute safety checks.

## 2024-10-31 NOTE — PLAN OF CARE
Problem: Risk for Elopement  Goal: Patient will not exit the unit/facility without proper excort  10/30/2024 2356 by Marlen Henson LPN  Outcome: Progressing     Problem: Anxiety  Goal: Will report anxiety at manageable levels  Description: INTERVENTIONS:  1. Administer medication as ordered  2. Teach and rehearse alternative coping skills  3. Provide emotional support with 1:1 interaction with staff  10/30/2024 2356 by Marlen Henson LPN  Outcome: Progressing   No exit seeking this evening  refused scheduled meds   no emergency meds needed

## 2024-11-01 PROCEDURE — 6370000000 HC RX 637 (ALT 250 FOR IP): Performed by: PSYCHIATRY & NEUROLOGY

## 2024-11-01 PROCEDURE — 99232 SBSQ HOSP IP/OBS MODERATE 35: CPT | Performed by: PSYCHIATRY & NEUROLOGY

## 2024-11-01 PROCEDURE — 2040000000 HC PSYCH ICU R&B

## 2024-11-01 RX ORDER — NICOTINE 21 MG/24HR
1 PATCH, TRANSDERMAL 24 HOURS TRANSDERMAL DAILY
Status: DISCONTINUED | OUTPATIENT
Start: 2024-11-01 | End: 2024-11-04

## 2024-11-01 NOTE — GROUP NOTE
Group Therapy Note    Date: 11/1/2024    Group Start Time: 0900  Group End Time: 0930  Group Topic: Community Meeting    Randa Jackson        Group Therapy Note    Attendees: 6/7       Patient's Goal:  \"Hope. Yes, I will talk with staff today too\"    Notes:  Goal setting    Status After Intervention:  Unchanged    Participation Level: Minimal    Participation Quality: Appropriate      Speech:  hesitant      Thought Process/Content: Delusional      Affective Functioning: Blunted and Flat      Mood: depressed      Level of consciousness:  Preoccupied      Response to Learning: Able to verbalize current knowledge/experience      Endings: None Reported    Modes of Intervention: Education, Support, Socialization, and Exploration      Discipline Responsible: Behavorial Health Tech      Signature:  Randa Anthony

## 2024-11-01 NOTE — GROUP NOTE
Group Therapy Note    Date: 11/1/2024    Group Start Time: 1400  Group End Time: 1430  Group Topic: Group Documentation    Randa Jackson      Wellness Nursing  Group Note        Date: 11/1/2024  Start Time: 2pm  End Time: 1430      Number of Participants in Group & Unit Census:  3/6    Topic: Spirituality as a coping mechanism    Goal of Group:Attend and participate in group      Comments:     Patient did not participate in  Wellness nursing  group, despite staff encouragement and explanation of benefits.  Patient remain seclusive to self.  Q15 minute safety checks maintained for patient safety and will continue to encourage patient to attend unit programming.

## 2024-11-01 NOTE — GROUP NOTE
Group Therapy Note    Date: 11/1/2024    Group Start Time: 1100  Group End Time: 1145  Group Topic: Psychoeducation    [facility]    Honey Du [facility]S    Group Therapy Note    Attendees: 4/7       Patient's Goal:  Patient will demonstrate improved interpersonal skills    Notes:  Patient attended group and participated    Status After Intervention:  Improved    Participation Level: Active Listener and Interactive    Participation Quality: Appropriate and Sharing      Speech:  hesitant      Thought Process/Content: Logical      Affective Functioning: Constricted/Restricted      Mood: anxious and depressed      Level of consciousness:  Alert, Attentive, and Preoccupied      Response to Learning: Able to verbalize current knowledge/experience and Able to verbalize/acknowledge new learning      Endings: None Reported    Modes of Intervention: Education, Support, Socialization, and Exploration      Discipline Responsible: Psychoeducational Specialist      Signature:  KALIN Lacy

## 2024-11-01 NOTE — PLAN OF CARE
Problem: Risk for Elopement  Goal: Patient will not exit the unit/facility without proper excort  Note: Patient did not exhibit any elopement type behaviors.  He remained in behavior control.     Problem: Anxiety  Goal: Will report anxiety at manageable levels  Description: INTERVENTIONS:  1. Administer medication as ordered  2. Teach and rehearse alternative coping skills  3. Provide emotional support with 1:1 interaction with staff  Note: Patient denied any anxiety.  He appeared suspicious of staff at times, but overall was calm.     Problem: Depression/Self Harm  Goal: Effect of psychiatric condition will be minimized and patient will be protected from self harm  Description: INTERVENTIONS:  1. Assess impact of patient's symptoms on level of functioning, self care needs and offer support as indicated  2. Assess patient/family knowledge of depression, impact on illness and need for teaching  3. Provide emotional support, presence and reassurance  4. Assess for possible suicidal thoughts or ideation. If patient expresses suicidal thoughts or statements do not leave alone, initiate Suicide Precautions, move to a room close to the nursing station and obtain sitter  5. Initiate consults as appropriate with Mental Health Professional, Spiritual Care, Psychosocial CNS, and consider a recommendation to the LIP for a Psychiatric Consultation  Note: Patient did not have any attempts at self harm.  He denies any depression or suicidal ideation.  Patient was very blunt in his responses.     Problem: Psychosis  Goal: Will report no hallucinations or delusions  Description: INTERVENTIONS:  1. Administer medication as  ordered  2. Assist with reality testing to support increasing orientation  3. Assess if patient's hallucinations or delusions are encouraging self harm or harm to others and intervene as appropriate  Note: Patient denies any hallucinations.  He did not appear to be responding to any internal stimuli.  Patient

## 2024-11-01 NOTE — PLAN OF CARE
Problem: Risk for Elopement  Goal: Patient will not exit the unit/facility without proper excort  11/1/2024 1452 by Beth Ramirez LPN  Note: Patient denies any intent to leave facility and exhibits no exit seeking behaviors.     Problem: Depression/Self Harm  Goal: Effect of psychiatric condition will be minimized and patient will be protected from self harm  Description: INTERVENTIONS:  1. Assess impact of patient's symptoms on level of functioning, self care needs and offer support as indicated  2. Assess patient/family knowledge of depression, impact on illness and need for teaching  3. Provide emotional support, presence and reassurance  4. Assess for possible suicidal thoughts or ideation. If patient expresses suicidal thoughts or statements do not leave alone, initiate Suicide Precautions, move to a room close to the nursing station and obtain sitter  5. Initiate consults as appropriate with Mental Health Professional, Spiritual Care, Psychosocial CNS, and consider a recommendation to the LIP for a Psychiatric Consultation  11/1/2024 1452 by Beth Ramirez LPN  Note: Patient denies suicidal/homicidal ideations but reports general anxiety denying depression or hallucinations but presents to be preoccupied with internal stimuli. Patient is non compliant with his education regime despite providing education and support. Patient displays no aggressive behaviors as he continues to be seclusive to room and aloof among peers. Programming benefits continue to be encouraged.     Problem: Psychosis  Goal: Will report no hallucinations or delusions  Description: INTERVENTIONS:  1. Administer medication as  ordered  2. Assist with reality testing to support increasing orientation  3. Assess if patient's hallucinations or delusions are encouraging self harm or harm to others and intervene as appropriate  11/1/2024 1452 by Beth Ramirez LPN  Note: Patient denies  any hallucinations or delusions but is

## 2024-11-02 PROCEDURE — 99232 SBSQ HOSP IP/OBS MODERATE 35: CPT | Performed by: PSYCHIATRY & NEUROLOGY

## 2024-11-02 PROCEDURE — 99232 SBSQ HOSP IP/OBS MODERATE 35: CPT | Performed by: INTERNAL MEDICINE

## 2024-11-02 PROCEDURE — 6370000000 HC RX 637 (ALT 250 FOR IP): Performed by: PSYCHIATRY & NEUROLOGY

## 2024-11-02 PROCEDURE — 2040000000 HC PSYCH ICU R&B

## 2024-11-02 PROCEDURE — 6370000000 HC RX 637 (ALT 250 FOR IP): Performed by: INTERNAL MEDICINE

## 2024-11-02 RX ORDER — CARVEDILOL 6.25 MG/1
6.25 TABLET ORAL 2 TIMES DAILY WITH MEALS
Status: DISCONTINUED | OUTPATIENT
Start: 2024-11-02 | End: 2024-11-04 | Stop reason: HOSPADM

## 2024-11-02 RX ADMIN — LOSARTAN POTASSIUM 50 MG: 50 TABLET, FILM COATED ORAL at 10:54

## 2024-11-02 RX ADMIN — METOPROLOL SUCCINATE 50 MG: 25 TABLET, EXTENDED RELEASE ORAL at 10:54

## 2024-11-02 RX ADMIN — RISPERIDONE 0.5 MG: 1 TABLET, FILM COATED ORAL at 10:53

## 2024-11-02 RX ADMIN — AMLODIPINE BESYLATE 10 MG: 10 TABLET ORAL at 10:54

## 2024-11-02 NOTE — PLAN OF CARE
Problem: Depression/Self Harm  Goal: Effect of psychiatric condition will be minimized and patient will be protected from self harm  Description: INTERVENTIONS:  1. Assess impact of patient's symptoms on level of functioning, self care needs and offer support as indicated  2. Assess patient/family knowledge of depression, impact on illness and need for teaching  3. Provide emotional support, presence and reassurance  4. Assess for possible suicidal thoughts or ideation. If patient expresses suicidal thoughts or statements do not leave alone, initiate Suicide Precautions, move to a room close to the nursing station and obtain sitter  5. Initiate consults as appropriate with Mental Health Professional, Spiritual Care, Psychosocial CNS, and consider a recommendation to the LIP for a Psychiatric Consultation  Outcome: Progressing     Problem: Psychosis  Goal: Will report no hallucinations or delusions  Description: INTERVENTIONS:  1. Administer medication as  ordered  2. Assist with reality testing to support increasing orientation  3. Assess if patient's hallucinations or delusions are encouraging self harm or harm to others and intervene as appropriate  Outcome: Progressing

## 2024-11-02 NOTE — PLAN OF CARE
Problem: Risk for Elopement  Goal: Patient will not exit the unit/facility without proper excort  Outcome: Progressing     Problem: Anxiety  Goal: Will report anxiety at manageable levels  Description: INTERVENTIONS:  1. Administer medication as ordered  2. Teach and rehearse alternative coping skills  3. Provide emotional support with 1:1 interaction with staff  Outcome: Progressing     Problem: Depression/Self Harm  Goal: Effect of psychiatric condition will be minimized and patient will be protected from self harm  Description: INTERVENTIONS:  1. Assess impact of patient's symptoms on level of functioning, self care needs and offer support as indicated  2. Assess patient/family knowledge of depression, impact on illness and need for teaching  3. Provide emotional support, presence and reassurance  4. Assess for possible suicidal thoughts or ideation. If patient expresses suicidal thoughts or statements do not leave alone, initiate Suicide Precautions, move to a room close to the nursing station and obtain sitter  5. Initiate consults as appropriate with Mental Health Professional, Spiritual Care, Psychosocial CNS, and consider a recommendation to the LIP for a Psychiatric Consultation  11/2/2024 1137 by Staci Ta RN  Outcome: Progressing     Problem: Psychosis  Goal: Will report no hallucinations or delusions  Description: INTERVENTIONS:  1. Administer medication as  ordered  2. Assist with reality testing to support increasing orientation  3. Assess if patient's hallucinations or delusions are encouraging self harm or harm to others and intervene as appropriate  11/2/2024 1137 by Staci Ta, RN  Outcome: Progressing       Patient can be seen in the day room alert, patient has been visibile on the unit attending groups and socializing with peers. Patient took all scheduled medications and denies any side effects. Patient denies any homicidal or suicidal ideation. Patient denies any olfactory, visual

## 2024-11-02 NOTE — GROUP NOTE
Group Therapy Note    Date: 11/2/2024    Group Start Time: 0900  Group End Time: 0920  Group Topic: Community Meeting    Randa Jackson        Community Meeting Group Note        Date: 11/2/2024  Start Time: 9am  End Time: 0920      Number of Participants in Group & Unit Census:  3/7    Topic: Goal setting    Goal of Group:Set a short term goal for the day      Comments:     Patient did not participate in Community Meeting group, despite staff encouragement and explanation of benefits.  Patient remain seclusive to self.  Q15 minute safety checks maintained for patient safety and will continue to encourage patient to attend unit programming.

## 2024-11-03 PROCEDURE — 6370000000 HC RX 637 (ALT 250 FOR IP): Performed by: INTERNAL MEDICINE

## 2024-11-03 PROCEDURE — 2040000000 HC PSYCH ICU R&B

## 2024-11-03 PROCEDURE — 6370000000 HC RX 637 (ALT 250 FOR IP): Performed by: PSYCHIATRY & NEUROLOGY

## 2024-11-03 PROCEDURE — 99232 SBSQ HOSP IP/OBS MODERATE 35: CPT | Performed by: PSYCHIATRY & NEUROLOGY

## 2024-11-03 PROCEDURE — 99232 SBSQ HOSP IP/OBS MODERATE 35: CPT | Performed by: INTERNAL MEDICINE

## 2024-11-03 RX ADMIN — AMLODIPINE BESYLATE 10 MG: 10 TABLET ORAL at 10:02

## 2024-11-03 RX ADMIN — RISPERIDONE 0.5 MG: 1 TABLET, FILM COATED ORAL at 10:03

## 2024-11-03 RX ADMIN — LOSARTAN POTASSIUM 50 MG: 50 TABLET, FILM COATED ORAL at 10:03

## 2024-11-03 RX ADMIN — CARVEDILOL 6.25 MG: 6.25 TABLET, FILM COATED ORAL at 10:03

## 2024-11-03 NOTE — PLAN OF CARE
Problem: Anxiety  Goal: Will report anxiety at manageable levels  Description: INTERVENTIONS:  1. Administer medication as ordered  2. Teach and rehearse alternative coping skills  3. Provide emotional support with 1:1 interaction with staff  Outcome: Progressing    Patient denies any anxiety at this time.      Problem: Psychosis  Goal: Will report no hallucinations or delusions  Description: INTERVENTIONS:  1. Administer medication as  ordered  2. Assist with reality testing to support increasing orientation  3. Assess if patient's hallucinations or delusions are encouraging self harm or harm to others and intervene as appropriate  Outcome: Progressing      Patient denies any audio or visual hallucinations.

## 2024-11-03 NOTE — PLAN OF CARE
Problem: Anxiety  Goal: Will report anxiety at manageable levels  Description: INTERVENTIONS:  1. Administer medication as ordered  2. Teach and rehearse alternative coping skills  3. Provide emotional support with 1:1 interaction with staff  11/3/2024 1018 by Staci Ta RN  Outcome: Progressing     Problem: Depression/Self Harm  Goal: Effect of psychiatric condition will be minimized and patient will be protected from self harm  Description: INTERVENTIONS:  1. Assess impact of patient's symptoms on level of functioning, self care needs and offer support as indicated  2. Assess patient/family knowledge of depression, impact on illness and need for teaching  3. Provide emotional support, presence and reassurance  4. Assess for possible suicidal thoughts or ideation. If patient expresses suicidal thoughts or statements do not leave alone, initiate Suicide Precautions, move to a room close to the nursing station and obtain sitter  5. Initiate consults as appropriate with Mental Health Professional, Spiritual Care, Psychosocial CNS, and consider a recommendation to the LIP for a Psychiatric Consultation  Outcome: Progressing     Problem: Psychosis  Goal: Will report no hallucinations or delusions  Description: INTERVENTIONS:  1. Administer medication as  ordered  2. Assist with reality testing to support increasing orientation  3. Assess if patient's hallucinations or delusions are encouraging self harm or harm to others and intervene as appropriate  11/3/2024 1018 by Staci Ta RN  Outcome: Progressing       Patient is seen in the day room alert. Upon approach patient appears preoccupied and guarded. Patient was complaint with scheduled medications today and denies any side effects. Patient is compliant with daily ADLs and showered. Patient reports adequate intake and rest. Patient denies any homicidal or suicidal ideation. Patient denies any depression or anxiety. Patient has been visibile on the unit

## 2024-11-03 NOTE — GROUP NOTE
Group Therapy Note    Date: 11/3/2024    Group Start Time: 1000  Group End Time: 1035  Group Topic: Psychoeducation    Yumiko Abdi MSW, AMY        Group Therapy Note    Attendees: 4/7       Patient was offered group therapy today but declined to participate despite encouragement from staff.  1:1 was offered.       Signature:  MABLE Heart, AMY

## 2024-11-04 VITALS
TEMPERATURE: 97.5 F | DIASTOLIC BLOOD PRESSURE: 99 MMHG | HEART RATE: 96 BPM | WEIGHT: 230 LBS | OXYGEN SATURATION: 99 % | HEIGHT: 72 IN | BODY MASS INDEX: 31.15 KG/M2 | SYSTOLIC BLOOD PRESSURE: 137 MMHG | RESPIRATION RATE: 14 BRPM

## 2024-11-04 PROCEDURE — 99239 HOSP IP/OBS DSCHRG MGMT >30: CPT | Performed by: PSYCHIATRY & NEUROLOGY

## 2024-11-04 PROCEDURE — 6370000000 HC RX 637 (ALT 250 FOR IP): Performed by: PSYCHIATRY & NEUROLOGY

## 2024-11-04 PROCEDURE — 6370000000 HC RX 637 (ALT 250 FOR IP): Performed by: INTERNAL MEDICINE

## 2024-11-04 RX ORDER — AMLODIPINE BESYLATE 10 MG/1
10 TABLET ORAL DAILY
Qty: 30 TABLET | Refills: 0 | Status: SHIPPED | OUTPATIENT
Start: 2024-11-04

## 2024-11-04 RX ORDER — LOSARTAN POTASSIUM 50 MG/1
50 TABLET ORAL DAILY
Qty: 30 TABLET | Refills: 0 | Status: SHIPPED | OUTPATIENT
Start: 2024-11-04

## 2024-11-04 RX ORDER — RISPERIDONE 0.5 MG/1
0.5 TABLET ORAL 2 TIMES DAILY
Qty: 60 TABLET | Refills: 0 | Status: SHIPPED | OUTPATIENT
Start: 2024-11-04

## 2024-11-04 RX ORDER — CARVEDILOL 6.25 MG/1
6.25 TABLET ORAL 2 TIMES DAILY WITH MEALS
Qty: 60 TABLET | Refills: 0 | Status: SHIPPED | OUTPATIENT
Start: 2024-11-04

## 2024-11-04 RX ORDER — POLYETHYLENE GLYCOL 3350 17 G
2 POWDER IN PACKET (EA) ORAL
Status: DISCONTINUED | OUTPATIENT
Start: 2024-11-04 | End: 2024-11-04 | Stop reason: HOSPADM

## 2024-11-04 RX ORDER — ATORVASTATIN CALCIUM 20 MG/1
20 TABLET, FILM COATED ORAL NIGHTLY
Qty: 30 TABLET | Refills: 0 | Status: SHIPPED | OUTPATIENT
Start: 2024-11-04

## 2024-11-04 RX ADMIN — LOSARTAN POTASSIUM 50 MG: 50 TABLET, FILM COATED ORAL at 07:46

## 2024-11-04 RX ADMIN — AMLODIPINE BESYLATE 10 MG: 10 TABLET ORAL at 07:47

## 2024-11-04 RX ADMIN — CARVEDILOL 6.25 MG: 6.25 TABLET, FILM COATED ORAL at 07:46

## 2024-11-04 RX ADMIN — RISPERIDONE 0.5 MG: 1 TABLET, FILM COATED ORAL at 07:47

## 2024-11-04 NOTE — DISCHARGE INSTR - COC
Continuity of Care Form    Patient Name: Bryant Sellers   :  1995  MRN:  [id number]    Admit date:  10/29/2024  Discharge date:  ***    Code Status Order: Full Code   Advance Directives:   Advance Care Flowsheet Documentation             Admitting Physician:  Srinath Jaime MD  PCP: Anthony Boo APRN - NP    Discharging Nurse: ***  Discharging Hospital Unit/Room#: 0115/0115-01  Discharging Unit Phone Number: ***    Emergency Contact:   Extended Emergency Contact Information  Primary Emergency Contact: Bryant Sellers  Address: [address] [place] [id number]  Home Phone: [phone]  Relation: Parent  Secondary Emergency Contact: MELISSA SELLERS  Home Phone: [phone]  Mobile Phone: [phone]  Relation: Parent   needed? No    Past Surgical History:  Past Surgical History:   Procedure Laterality Date    FRACTURE SURGERY  2016    right hand 2016       Immunization History:   Immunization History   Administered Date(s) Administered    DTaP 1993, 1994, 1995, 10/27/1998    DTaP vaccine 1993, 1994, 1995, 10/27/1998    DTaP, INFANRIX, (age 6w-6y), IM, 0.5mL 1993, 1994, 1995, 10/27/1998    Hep A, HAVRIX, VAQTA, (age 12m-18y), IM, 0.5mL 2008, 2008    Hep B, ENGERIX-B, RECOMBIVAX-HB, (age Birth - 19y), IM, 0.5mL 1993, 1993, 1994    Hepatitis A 2008, 2008    Hepatitis A Vaccine 2008, 2008    Hepatitis B (Engerix-B) 1993, 1993, 1994    Hepatitis B vaccine 1993, 1993, 1994    Hib PRP-OMP, PEDVAXHIB, (age 2m-6y, Adlt Risk), IM, 0.5mL 1993, 1994, 1994, 1995    MMR, PRIORIX, M-M-R II, (age 12m+), SC, 0.5mL 1995, 2000    Measles/Rubella 1995, 2000    Meningococcal ACWY, MENACTRA (MenACWY-D), (age 9m-55y), IM, 0.5mL 2006    PPD Test 10/06/2021, 10/12/2021    Poliovirus, IPOL, (age 6w+), SC/IM, 0.5mL

## 2024-11-04 NOTE — PLAN OF CARE
Problem: Depression/Self Harm  Goal: Effect of psychiatric condition will be minimized and patient will be protected from self harm  Description: INTERVENTIONS:  1. Assess impact of patient's symptoms on level of functioning, self care needs and offer support as indicated  2. Assess patient/family knowledge of depression, impact on illness and need for teaching  3. Provide emotional support, presence and reassurance  4. Assess for possible suicidal thoughts or ideation. If patient expresses suicidal thoughts or statements do not leave alone, initiate Suicide Precautions, move to a room close to the nursing station and obtain sitter  5. Initiate consults as appropriate with Mental Health Professional, Spiritual Care, Psychosocial CNS, and consider a recommendation to the LIP for a Psychiatric Consultation  11/3/2024 2115 by Nhan Noonan, RN  Outcome: Progressing    Patient denies any suicidal thoughts at this time. Patient remains free from self harm.      Problem: Psychosis  Goal: Will report no hallucinations or delusions  Description: INTERVENTIONS:  1. Administer medication as  ordered  2. Assist with reality testing to support increasing orientation  3. Assess if patient's hallucinations or delusions are encouraging self harm or harm to others and intervene as appropriate  11/3/2024 2115 by Nhan Noonan, RN  Outcome: Progressing      Patient denies any audio or visual hallucinations at this time. Patient does not appear to be responding to internal stimuli.

## 2024-11-04 NOTE — PROGRESS NOTES
Behavioral Services  Medicare Certification Upon Admission    I certify that this patient's inpatient psychiatric hospital admission is medically necessary for:    [x] (1) Treatment which could reasonably be expected to improve this patient's condition,       [x] (2) Or for diagnostic study;     AND     [x](2) The inpatient psychiatric services are provided while the individual is under the care of a physician and are included in the individualized plan of care.    Estimated length of stay/service 3-5 days    Plan for post-hospital care hc    Electronically signed by Srinath Jaime MD on 10/30/2024 at 3:08 PM      
  Daily Progress Note  10/31/2024    Patient Name: Bryant Pimentel    CHIEF COMPLAINT: Acute psychosis         SUBJECTIVE:    Robert was seen for follow-up assessment today.  He has refused scheduled medications.  He has been behaviorally in control and has not required any emergency medications in the past 24 hours.  Nursing staff report patient showered and has been spending some time in the day area.  Patient was observed to be attending group prior to assessment.  He was attempting to identify goals for himself.  Writer sat with him in the day area.  He declined need for privacy.  Patient presented as disconnected and aloof but he did attempt to engage in conversation with writer.  He was guarded and suspicious of writer's intentions at first.  Patient is minimizing of events which led to his hospitalization.  He did say, however, that his family was concerned about him because he was acting \"irrational\".  He would not identify any behaviors that he engaged in.  Patient is also minimizing perceptual disturbances and is denying auditory and visual hallucinations.  Thought process was slow in responses to questions latent.  Patient experiencing possible thought blocking.  Mood was otherwise pleasant but affect was flat.  He does not engage much in spontaneous conversation.  He seems to have no insight into his mental illness.  He did report improvement in suicidal ideation.  At this time patient has yet to demonstrate stability and warrants further hospitalization for safety and stabilization.    Appetite:  [x] Adequate/Unchanged  [] Increased  [] Decreased      Sleep:       [] Adequate/Unchanged  [x] Fair  [] Poor      Group Attendance on Unit:   [] Yes   [x] Selectively    [] No    Compliant with scheduled medications: [] Yes  [x] No    Received emergency medications in past 24 hrs: [] Yes   [x] No    Medication Side Effects: Denies         Mental Status Exam  Level of consciousness: Awake and alert  Appearance:  
CLINICAL PHARMACY NOTE: MEDS TO BEDS    Total # of Prescriptions Filled: 5   The following medications were delivered to the patient:  Risperidone 0.5mg  Losartan 50mg  Carvedilol 6.25mg  Atorvastatin 20mg  Amlodipine 10mg    Additional Documentation: 11/4/24 kbg delviered to [facility] A nurse station 11:19am  
IN-PATIENT SERVICE  [facility] Internal Medicine    CONSULTATION / HISTORY AND PHYSICAL EXAMINATION            Date:   10/31/2024  Patient name:  Bryant Pimentel  Date of admission:  10/29/2024  2:51 PM  MRN:   [id number]  Account:  [id number]  YOB: 1995  PCP:    Anthony Boo APRN - NP  Room:   [address] [place] [address]  Code Status:    Full Code    Physician Requesting Consult: Srinath Jaime,*    Reason for Consult:  medical management    Chief Complaint:     Chief Complaint   Patient presents with    Mental Health Problem       History Obtained From:     Patient medical record nursing staff    History of Present Illness:   Patient, admitted to [facility] unit, with acute psychosis  Patient, has hyperlipidemia, and hypertension  He claims that he is on 3 different medically hypertensives, claimed that he is compliant with his medication  Does not check blood pressure regularly  Patient unfortunately is a poor historian  No complaints of shortness of breath, chest pain    Past Medical History:     Past Medical History:   Diagnosis Date    Essential hypertension 3/23/2020    Severe manic bipolar I disorder w/psychotic features, mood-congruent (HCC)         Past Surgical History:     Past Surgical History:   Procedure Laterality Date    FRACTURE SURGERY  2016    right hand 2016        Medications Prior to Admission:     Prior to Admission medications    Medication Sig Start Date End Date Taking? Authorizing Provider   atorvastatin (LIPITOR) 20 MG tablet Take 1 tablet by mouth daily 7/11/24   Esa Cardona MD   losartan (COZAAR) 50 MG tablet Take 1 tablet by mouth daily 7/5/24   Esa Cardona MD   metoprolol succinate (TOPROL XL) 50 MG extended release tablet Take 1 tablet by mouth daily 7/11/24   Esa Cardona MD   QUEtiapine (SEROQUEL XR) 150 MG TB24 extended release tablet  7/20/24   Esa Cardona MD   ibuprofen (ADVIL;MOTRIN) 600 MG tablet Take 1 
IN-PATIENT SERVICE  [facility] Internal Medicine    CONSULTATION / HISTORY AND PHYSICAL EXAMINATION            Date:   11/2/2024  Patient name:  Bryant Pimentel  Date of admission:  10/29/2024  2:51 PM  MRN:   [id number]  Account:  [id number]  YOB: 1995  PCP:    Anthony Boo APRN - NP  Room:   [address] [place] [address]  Code Status:    Full Code    Physician Requesting Consult: Srinath Jaime,*    Reason for Consult:  medical management    Chief Complaint:     Chief Complaint   Patient presents with    Mental Health Problem       History Obtained From:     Patient medical record nursing staff    History of Present Illness:   Patient, admitted to [facility] unit, with acute psychosis  Patient, has hyperlipidemia, and hypertension  He claims that he is on 3 different medically hypertensives, claimed that he is compliant with his medication  Does not check blood pressure regularly  Patient unfortunately is a poor historian  No complaints of shortness of breath, chest pain    Past Medical History:     Past Medical History:   Diagnosis Date    Essential hypertension 3/23/2020    Severe manic bipolar I disorder w/psychotic features, mood-congruent (HCC)         Past Surgical History:     Past Surgical History:   Procedure Laterality Date    FRACTURE SURGERY  2016    right hand 2016        Medications Prior to Admission:     Prior to Admission medications    Medication Sig Start Date End Date Taking? Authorizing Provider   atorvastatin (LIPITOR) 20 MG tablet Take 1 tablet by mouth daily 7/11/24   Esa Cardona MD   losartan (COZAAR) 50 MG tablet Take 1 tablet by mouth daily 7/5/24   Esa Cardona MD   metoprolol succinate (TOPROL XL) 50 MG extended release tablet Take 1 tablet by mouth daily 7/11/24   Esa Cardona MD   QUEtiapine (SEROQUEL XR) 150 MG TB24 extended release tablet  7/20/24   Esa Cardona MD   ibuprofen (ADVIL;MOTRIN) 600 MG tablet Take 1 
IN-PATIENT SERVICE  [facility] Internal Medicine    CONSULTATION / HISTORY AND PHYSICAL EXAMINATION            Date:   11/3/2024  Patient name:  Bryant Pimentel  Date of admission:  10/29/2024  2:51 PM  MRN:   [id number]  Account:  [id number]  YOB: 1995  PCP:    Anthony Boo APRN - NP  Room:   [address] [place] [address]  Code Status:    Full Code    Physician Requesting Consult: Srinath Jaime,*    Reason for Consult:  medical management    Chief Complaint:     Chief Complaint   Patient presents with    Mental Health Problem       History Obtained From:     Patient medical record nursing staff    History of Present Illness:   Patient, admitted to [facility] unit, with acute psychosis  Patient, has hyperlipidemia, and hypertension  He claims that he is on 3 different medically hypertensives, claimed that he is compliant with his medication  Does not check blood pressure regularly  Patient unfortunately is a poor historian  No complaints of shortness of breath, chest pain    Past Medical History:     Past Medical History:   Diagnosis Date    Essential hypertension 3/23/2020    Severe manic bipolar I disorder w/psychotic features, mood-congruent (HCC)         Past Surgical History:     Past Surgical History:   Procedure Laterality Date    FRACTURE SURGERY  2016    right hand 2016        Medications Prior to Admission:     Prior to Admission medications    Medication Sig Start Date End Date Taking? Authorizing Provider   atorvastatin (LIPITOR) 20 MG tablet Take 1 tablet by mouth daily 7/11/24   Esa Cardona MD   losartan (COZAAR) 50 MG tablet Take 1 tablet by mouth daily 7/5/24   Esa Cardona MD   metoprolol succinate (TOPROL XL) 50 MG extended release tablet Take 1 tablet by mouth daily 7/11/24   Esa Cardona MD   QUEtiapine (SEROQUEL XR) 150 MG TB24 extended release tablet  7/20/24   Esa Cardona MD   ibuprofen (ADVIL;MOTRIN) 600 MG tablet Take 1 
Initiation of seclusion  Pt calmly talking to staff in dayroom then rushes writer as he is entering nurses station, writer is able to close door. Pt approaches other staff and writer attempts to reengage and he does so briefly then becomes physically aggressive. He again calms and talk to writer and agrees to walk to his room then states \"that's not my room\" and again becomes physical with staff. He is escorted to seclusion room with staff X3, seclusion started at 1955. Md is notified of behavior and IM medication is ordered and administered with security and advocate present, 1:1 initiated.  
Initiation of seclusion  Pt woke up approached desk, \"how are you guys tonight?, I need to go outside\". Attempts to reorient him to his situation are ignored and he is trying doors and attempting to look into peers rooms. He is at first resistive to attempts at redirection and is now refusing, medication is offered and refused \"I don't need to take medication and I don't follow rules\". He is escorted to seclusion room X 3 staff along with security, advocate was present. With much encouragement he agrees to accept prn IM medication per md order. Seclusion started at 0250,1:1 initiated  
Left message for Jules Huber RN, ([phone]) to confirm last administration date for Abilify maintena injection.  
Pharmacy Medication History Note      List of current medications patient is taking is incomplete.    Source of information: Barry [facility] on [place] (spoke to Sil), [facility] ([place])    Changes made to medication list:  Medications removed (include reason, ex. therapy complete or physician discontinued, noncompliance):  Abilify maintena- duplicate  Losartan- duplicate  Nicotine inhaler- tx complete    Medications flagged for provider review:  Quetiapine- not filled since July at [facility]    Medications added/doses adjusted:  None    Other notes (ex. Recent course of antibiotics, Coumadin dosing):  OARRS negative  Per Barry, Abilify Maintena was last filled on 10/15/24. Left voicemail with [facility] to confirm administration.       Current Home Medication List at Time of Admission:  Prior to Admission medications    Medication Sig   atorvastatin (LIPITOR) 20 MG tablet Take 1 tablet by mouth daily   losartan (COZAAR) 50 MG tablet Take 1 tablet by mouth daily   metoprolol succinate (TOPROL XL) 50 MG extended release tablet Take 1 tablet by mouth daily   ibuprofen (ADVIL;MOTRIN) 600 MG tablet Take 1 tablet by mouth 3 times daily as needed for Pain   diclofenac sodium (VOLTAREN) 1 % GEL Apply 4 g topically 4 times daily   amLODIPine (NORVASC) 10 MG tablet Take 1 tablet by mouth daily   ARIPiprazole ER (ABILIFY MAINTENA) 400 MG SRER Inject 400 mg into the muscle every 30 days       Please let me know if you have any questions about this encounter. Thank you!    Electronically signed by Randa Cardona RPH on 10/29/2024 at 4:40 PM        
RT ASSESSMENT TREATMENT GOALS    [x]Pt Goal:  Pt will identify 1-2 positive coping skills by time of discharge.    []Pt Goal:  Pt will identify 1-2 positive aspects of self by time of discharge.    [x]Pt Goal:  Pt will remain on task/topic for 15-30 minutes during group by time of discharge.    []Pt Goal:  Pt will identify 1-2 aspects of relapse prevention plan by time of discharge.    []Pt Goal:  Pt will join in conversation with peers 1-2 times per group by time of discharge.    []Pt Goal:  Pt will identify 1-2 new leisure interests by time of discharge.    [x]Pt Goal:  Pt will not voice any delusional content by time of discharge.    
Received return call from Jules Huber RN, ([phone]) who confirms patient last received Abilify maintena 400 mg on 10/10/24. His next injection is due 11/7/24.  
Seclusion discontinued at this time  Pt is controlled and cooperative   
Final    Total Bilirubin 10/29/2024 1.1  0.0 - 1.2 mg/dL Final    Alkaline Phosphatase 10/29/2024 93  40 - 129 U/L Final    ALT 10/29/2024 56 (H)  10 - 50 U/L Final    AST 10/29/2024 95 (H)  10 - 50 U/L Final    Salicylate Lvl 10/29/2024 <1.0  0.0 - 10.0 mg/dL Final    Acetaminophen Level 10/29/2024 <5 (L)  10 - 30 ug/mL Final    Ethanol Lvl 10/29/2024 <10  <10 mg/dL Final    Ethanol percent 10/29/2024 0.001  <0.010 % Final    Magnesium 10/29/2024 2.2  1.6 - 2.6 mg/dL Final    Amphetamine Screen, Ur 10/29/2024 NEGATIVE  NEGATIVE Final    Cutoff: 1000 ng/mL    Barbiturate Screen, Ur 10/29/2024 NEGATIVE  NEGATIVE Final    Cutoff: 200 ng/ml    Benzodiazepine Screen, Urine 10/29/2024 NEGATIVE  NEGATIVE Final    Cutoff: 200 ng/ml    Cocaine Metabolite, Urine 10/29/2024 NEGATIVE  NEGATIVE Final    Cutoff: 300 ng/ml    Methadone Screen, Urine 10/29/2024 NEGATIVE  NEGATIVE Final    Cutoff: 300 ng/ml    Opiates, Urine 10/29/2024 NEGATIVE  NEGATIVE Final    Cutoff: 300 ng/ml    Phencyclidine, Urine 10/29/2024 NEGATIVE  NEGATIVE Final    Cutoff: 25 ng/ml    Cannabinoid Scrn, Ur 10/29/2024 POSITIVE (A)  NEGATIVE Final    Cutoff: 50 ng/ml    Oxycodone Screen, Ur 10/29/2024 NEGATIVE  NEGATIVE Final    Cutoff: 100 ng/ml    Fentanyl, Ur 10/29/2024 NEGATIVE  NEGATIVE Final    Cutoff: 5 ng/ml    Test Information 10/29/2024 This method is a screening test to detect only these drug classes as part of a medical workup. Confirmatory testing by another method should be ordered if clinically indicated.   Final    Hemoglobin A1C 10/29/2024 5.2  4.0 - 6.0 % Final    Estimated Avg Glucose 10/29/2024 103  mg/dL Final    Comment: The ADA and AACC recommend providing the estimated average glucose result to permit better   patient understanding of their HBA1c result.      Cholesterol, Total 10/29/2024 145  0 - 199 mg/dL Final    Comment:    Cholesterol Guidelines:      <200  Desirable   200-240  Borderline      >240  Undesirable         HDL 
   Comment:    HDL Guidelines:    <40     Undesirable   40-59    Borderline    >59     Desirable         LDL Cholesterol 10/29/2024 91  0 - 100 mg/dL Final    Comment:    LDL Guidelines:     <100    Desirable   100-129   Near to/above Desirable   130-159   Borderline      >159   Undesirable     Direct (measured) LDL and calculated LDL are not interchangeable tests.      Chol/HDL Ratio 10/29/2024 3.3   Final    Triglycerides 10/29/2024 52  0 - 149 mg/dL Final    Comment:    Triglyceride Guidelines:     <150   Desirable   150-199  Borderline   200-499  High     >499   Very high   Based on AHA Guidelines for fasting triglyceride, October 2012.              Reviewed patient's current plan of care and vital signs with nursing staff.    Labs reviewed: [x] Yes  Vitals reviewed: [x] Yes      Medications  Current Facility-Administered Medications: nicotine (NICODERM CQ) 14 MG/24HR 1 patch, 1 patch, TransDERmal, Daily  amLODIPine (NORVASC) tablet 10 mg, 10 mg, Oral, Daily  haloperidol (HALDOL) tablet 5 mg, 5 mg, Oral, Q6H PRN **AND** LORazepam (ATIVAN) tablet 2 mg, 2 mg, Oral, Q6H PRN  haloperidol lactate (HALDOL) injection 5 mg, 5 mg, IntraMUSCular, Q6H PRN **AND** LORazepam (ATIVAN) injection 2 mg, 2 mg, IntraMUSCular, Q6H PRN **AND** diphenhydrAMINE (BENADRYL) injection 50 mg, 50 mg, IntraMUSCular, Q6H PRN  atorvastatin (LIPITOR) tablet 20 mg, 20 mg, Oral, Nightly  losartan (COZAAR) tablet 50 mg, 50 mg, Oral, Daily  metoprolol succinate (TOPROL XL) extended release tablet 50 mg, 50 mg, Oral, Daily  risperiDONE (RISPERDAL) tablet 0.5 mg, 0.5 mg, Oral, BID  acetaminophen (TYLENOL) tablet 650 mg, 650 mg, Oral, Q6H PRN  ibuprofen (ADVIL;MOTRIN) tablet 400 mg, 400 mg, Oral, Q6H PRN  hydrOXYzine HCl (ATARAX) tablet 50 mg, 50 mg, Oral, TID PRN  traZODone (DESYREL) tablet 50 mg, 50 mg, Oral, Nightly PRN  polyethylene glycol (GLYCOLAX) packet 17 g, 17 g, Oral, Daily PRN  aluminum & magnesium hydroxide-simethicone (MAALOX) 
PRN    ASSESSMENT  Acute psychosis (HCC)         PLAN  Patient symptoms are: Improving  Monitor need and frequency of PRN medications.  Encourage participation in groups and milieu.  Medication changes -none  discharge planning 1 to 2 days days  Follow-up daily while inpatient.     Patient continues to be monitored in the inpatient psychiatric facility at [facility] for safety and stabilization. Patient continues to need, on a daily basis, active treatment furnished directly by or requiring the supervision of inpatient psychiatric personnel.    Electronically signed by GRANT WILKINS MD on 11/3/2024 at 4:49 PM    **This report has been created using voice recognition software. It may contain minor errors which are inherent in voice recognition technology.**

## 2024-11-04 NOTE — TRANSITION OF CARE
Behavioral Health Transition Record    Patient Name: Bryant Pimentel  YOB: 1995   Medical Record Number: [id number]  Date of Admission: 10/29/2024  2:51 PM   Date of Discharge: 11/4/2024    Attending Provider: Srinath Jaime,*   Discharging Provider: Dr. Wallace  To contact this individual call [phone] and ask the  to page.  If unavailable, ask to be transferred to Behavioral Health Provider on call.  A Behavioral Health Provider will be available on call 24/7 and during holidays.    Primary Care Provider: Anthony Boo APRN - NP    No Known Allergies    Reason for Admission: Acute disordered/bizarre behavior or psychomotor agitation or retardation;interferes with ADLs so that patient cannot function at a less intensive care level of care during evaluation and treatment   A mental disorder causing major disability in social, interpersonal, occupational, and/or educational functioning that is leading to dangerous or life-threatening functioning, and that can only be addressed in an acute inpatient setting   Concerns about patient's safety in the community  Past Mental Health Diagnosis: a history of  Bipolar Disorder and Alcohol and/or Drug Use Disorder  Triggering event or precipitating factor: ... Unknown  Length of time/duration of triggering event: Weeks  Legal Status: Involuntary    Admission Diagnosis: Acute psychosis (HCC) [F23]    * No surgery found *    Results for orders placed or performed during the hospital encounter of 10/29/24   CBC with Auto Differential   Result Value Ref Range    WBC 8.5 3.5 - 11.0 k/uL    RBC 5.25 4.5 - 5.9 m/uL    Hemoglobin 15.9 13.5 - 17.5 g/dL    Hematocrit 46.6 41 - 53 %    MCV 88.7 80 - 100 fL    MCH 30.2 26 - 34 pg    MCHC 34.1 31 - 37 g/dL    RDW 13.6 11.5 - 14.9 %    Platelets 254 150 - 450 k/uL    MPV 7.6 6.0 - 12.0 fL    Neutrophils % 71 (H) 36 - 66 %    Lymphocytes % 21 (L) 24 - 44 %    Monocytes % 7 1 - 7 %    Eosinophils % 0 0 - 4 %

## 2024-11-04 NOTE — GROUP NOTE
Group Therapy Note    Date: 11/4/2024    Group Start Time: 1105  Group End Time: 1200  Group Topic: Music Therapy    [facility] Kirill Silva    Music Therapy Group Note        Date: 11/4/2024   Start Time: 1105  End Time: 1200      Number of Participants in Group & Unit Census:  4/7    Topic: Patients shared music, analyzing lyrics for themes and sharing advice based on themes within music. Patients discussed cognitive distortion \"polarized thinking\"     Goal of Group: Increase self-esteem; Increase self-expression; Increase sense of community; Demonstrate positive use of time; Increase socialization; Engage in peers support      Comments:     Patient did not participate in Music Therapy group, despite staff encouragement and explanation of benefits.  Patient remain seclusive to self.  Q15 minute safety checks maintained for patient safety and will continue to encourage patient to attend unit programming.

## 2024-11-04 NOTE — GROUP NOTE
Group Therapy Note    Date: 11/4/2024    Group Start Time: 0900  Group End Time: 0930  Group Topic: Community Meeting    Randa Jackson        Group Therapy Note    Attendees: 6/7       Patient's Goal:  \"Ask the doctor if I can go home\"    Notes:  Goal setting    Status After Intervention:  Improved    Participation Level: Minimal    Participation Quality: Appropriate      Speech:  hesitant      Thought Process/Content: Logical      Affective Functioning: Flat      Mood: anxious      Level of consciousness:  Preoccupied      Response to Learning: Able to verbalize current knowledge/experience      Endings: None Reported    Modes of Intervention: Education, Support, Socialization, and Exploration      Discipline Responsible: Behavorial Health Tech      Signature:  Randa Anthony

## 2024-11-04 NOTE — CARE COORDINATION
Completed probate paperwork filed through [facility] [address] via [employer] secure link.  
Social Work left HIPAA compliant voicemail with [facility] FACT to provide update and invited FACT team to visit patient  
Social work sent notice to court of patients discharge via secure [employer] email.  
50 MG tablet  risperiDONE 0.5 MG tablet         Follow Up Appointment: [facility] [address]  [facility] [id number] [phone]    Follow up on 11/13/2024  @315pm with Dr. Hernandez as part of the ACT Team mental heatlh followup     
Patient also denies experiencing auditory or visual hallucinations. Patient does appear to be responding to internal stimuli throughout assessment. Patient is linked to [facility] for outpatient services. Social work to provide support and assist with discharge planning.

## 2024-11-04 NOTE — DISCHARGE SUMMARY
Provider Discharge Summary     Patient ID:  Bryant Pimentel  [id number]  29 y.o.  1995    Admit date: 10/29/2024    Discharge date and time: 11/4/2024  2:32 PM     Admitting Physician: Srinath Jaime MD     Discharge Physician: Smith Wallace MD    Admission Diagnoses: Acute psychosis ([facility]) [F23]    Discharge Diagnoses:      Acute psychosis ([facility])     Patient Active Problem List   Diagnosis Code    Severe manic bipolar I disorder w/psychotic features, mood-congruent ([facility]) F31.2    Bipolar 1 disorder ([facility]) F31.9    Hyperopia H52.00    Essential hypertension I10    Cigarette nicotine dependence without complication F17.210    Acute psychosis ([facility]) F23        Admission Condition: poor    Discharged Condition: stable    Indication for Admission: threat to self    History of Present Illnes (present tense wording is of findings from admission exam and are not necessarily indicative of current findings):   Bryant Pimentel is a 29 y.o. male who has a past medical history of hypertension and mental illness. Patient presented to the ED by family reporting poor sleep and presenting with paranoid delusions.  Patient reported fear of falling asleep due to believe he will wake up in a different reality.  Family reports patient has been decompensating and not taking care of himself.  His behavior has become more erratic and recently he got out of his father's car randomly and went to lie down in a busy street.  He has been more childlike in behavior.  Patient reports feeling like something bad is going to happen.  He denied current suicidal or homicidal ideation however when [facility] in [facility] he stated he would be going to heaven soon.  Patient exhibited poverty of speech, thought blocking, Adventist preoccupation, and disorganized thought process. Parents reported that patient has been watching videos online which have prompted him to not take his psychotropic medications.  He does receive [facility] Abilify

## 2024-11-04 NOTE — BH NOTE
BEHAVIORAL SERVICES: One - Hour In- Person Review  For Management of Violent or Self - Destructive Behavior    Seclusion/Restraint:  seclusion     Reason for Intervention: Patient paranoid agitated as evidence by rushing at staff trying to harm them, trying to elope from the unit multiple times throughout the day.     Response to Intervention:  Patient agreed to walk to his room but stated that was not his room-needed to be escorted via staff to seclusion room.     Medical Record reviewed and discussed precipitating events/behaviors with RN initiating Intervention:  Yes    Patient Medical Status:  Vital Signs: refused   Respiratory Status: refused  Circulatory Status: refused  Skin Integrity:refused    Orientation: oriented to person, place, and situation    Mood/Affect: irritable and labile    Speech: Pressured    Thought Content: paranoid ideation    Thought Processes: Racing and Circumstantial    Rationale for continued use of intervention: Patient is not able to follow staff directions or able to remain calm and keep self safe.     Rationale for discontinuing intervention: Patient is able to: Patient is able to follow staff directions, able to remain calm and keep self safe.       One Hour Review Evaluation Physician Notification:  yes   
BEHAVIORAL SERVICES: One - Hour In- Person Review  For Management of Violent or Self - Destructive Behavior    Seclusion/Restraint: seclusion     Reason for Intervention: Patient not following staff directions, attempting doors on unit, trying to look into peers rooms.    Response to Intervention:  Refusing to follow staff direction.      Medical Record reviewed and discussed precipitating events/behaviors with RN initiating Intervention:  Yes    Patient Medical Status:  Vital Signs: refused  Respiratory Status: refused  Circulatory Status: refused  Skin Integrity:refused    Orientation: oriented to person and situation    Mood/Affect: irritable and labile    Speech: Loud and Pressured    Thought Content: paranoid ideation    Thought Processes: Racing    Rationale for continued use of intervention: Patient is not able to follow staff directions and not able to remain calm.     Rationale for discontinuing intervention: Patient is able to: Patient is able to follow staff directions and able to remain calm.     One Hour Review Evaluation Physician Notification:  yes   
[facility]  Day 3 Interdisciplinary Treatment Plan NOTE    Review Date & Time: 11/1/24 0805    Admission Type:   Admission Type: Involuntary    Reason for admission:  Reason for Admission: Patient presents to the ED with SI no specific plan stating \"I am going to go to [facility]\" He reports not sleeping in the past week and has slept less than 10 hours total. Patient is sexually innapropriate and masturbating at random times. Patient is linked with [facility] but has not been compliant with scheduled meds. patient is paranoid and religously focused.  Estimated Length of Stay: 5-7 days  Estimated Discharge Date Update: to be determined by physician    PATIENT STRENGTHS:  Patient Strengths    Patient Strengths and Limitations:   Addictive Behavior:Addictive Behavior  In the Past 3 Months, Have You Felt or Has Someone Told You That You Have a Problem With  : None  Medical Problems:  Past Medical History:   Diagnosis Date    Essential hypertension 3/23/2020    Severe manic bipolar I disorder w/psychotic features, mood-congruent (HCC)        Risk:  Fall Risk   Heath Scale Heath Scale Score: 21  BVC    Change in scores no Changes to plan of Care no    Status EXAM:   Mental Status and Behavioral Exam  Normal: No  Level of Assistance: Independent/Self  Facial Expression: Flat  Affect: Blunt  Level of Consciousness: Alert  Frequency of Checks: 4 times per hour, close  Mood:Normal: No  Mood: Suspicious  Motor Activity:Normal: Yes  Motor Activity: Decreased  Eye Contact: Good  Observed Behavior: Guarded, Withdrawn  Sexual Misconduct History: Current - no  Preception: [place] to person, [place] to time  Attention:Normal: Yes  Attention: Distractible  Thought Processes: Unremarkable  Thought Content:Normal: No  Thought Content: Poverty of content  Depression Symptoms: Isolative, Loss of interest  Anxiety Symptoms: No problems reported or observed.  Maria Ines Symptoms: No problems reported or observed.  Hallucinations: 
[facility]  Initial Interdisciplinary Treatment Plan NO      Original treatment plan Date & Time: 10/30/2024 0900    Admission Type:  Admission Type: Involuntary    Reason for admission:   Reason for Admission: Patient presents to the ED with SI no specific plan stating \"I am going to go to [facility]\" He reports not sleeping in the past week and has slept less than 10 hours total. Patient is sexually innapropriate and masturbating at random times. Patient is linked with unison but has not been compliant with scheduled meds. patient is paranoid and religously focused.    Estimated Length of Stay:  5-7days  Estimated Discharge Date: to be determined by physician    PATIENT STRENGTHS:  Patient Strengths:   Patient Strengths and Limitations:   Addictive Behavior: Addictive Behavior  In the Past 3 Months, Have You Felt or Has Someone Told You That You Have a Problem With  : None  Medical Problems:  Past Medical History:   Diagnosis Date    Essential hypertension 3/23/2020    Severe manic bipolar I disorder w/psychotic features, mood-congruent (HCC)      Status EXAM:Mental Status and Behavioral Exam  Normal: No  Level of Assistance: Independent/Self  Facial Expression: Flat  Affect: Unstable  Level of Consciousness: Alert  Frequency of Checks: 4 times per hour, close  Mood:Normal: No  Mood: Anxious, Labile, Suspicious, Angry  Motor Activity:Normal: No  Motor Activity: Increased  Eye Contact: Poor  Observed Behavior: Exit seeking, Guarded, Hostile, Impulsive, Preoccupied, Aggressive  Sexual Misconduct History: Current - no  Preception: [place] to person, [place] to time  Attention:Normal: No  Attention: Distractible  Thought Processes: Blocking  Thought Content:Normal: No  Thought Content: Paranoia, Preoccupations  Depression Symptoms: Impaired concentration, Increased irritability, Sleep disturbance  Anxiety Symptoms: Generalized  Maria Ines Symptoms: Labile, Poor judgment  Hallucinations: None  Delusions: 
[facility]  Discharge Note    Pt discharged with followings belongings:   Dental Appliances: None  Vision - Corrective Lenses: Eyeglasses  Hearing Aid: None  Jewelry: Watch, Necklace  Body Piercings Removed: N/A  Clothing: Footwear, Socks, Shirt, Shorts  Other Valuables: Wallet, Credit/Debit Card   Valuables sent home with patient. Patient educated on aftercare instructions: Yes  Information faxed to [facility] by staff  at 12:13 PM .Patient verbalize understanding of AVS:  Patient left with after visit summary and belongings in a white car going home with his dad.    Status EXAM upon discharge:  Mental Status and Behavioral Exam  Normal: No  Level of Assistance: Independent/Self  Facial Expression: Flat  Affect: Blunt  Level of Consciousness: Alert  Frequency of Checks: 4 times per hour, close  Mood:Normal: No  Mood: Suspicious  Motor Activity:Normal: Yes  Motor Activity: Increased  Eye Contact: Fair  Observed Behavior: Preoccupied, Guarded, Cooperative, Withdrawn  Sexual Misconduct History: Current - no  Preception: [place] to person, [place] to place, [place] to situation, [place] to time  Attention:Normal: No  Attention: Distractible  Thought Processes: Blocking  Thought Content:Normal: No  Thought Content: Preoccupations  Depression Symptoms: Impaired concentration  Anxiety Symptoms: Generalized  Maria Ines Symptoms: No problems reported or observed.  Hallucinations: None  Delusions: Yes  Delusions: Paranoid  Memory:Normal: No  Memory: Poor recent, Poor remote  Insight and Judgment: No  Insight and Judgment: Poor judgment, Poor insight    Tobacco Screening:  Practical Counseling, on admission, philip X, if applicable and completed (first 3 are required if patient doesn't refuse):            ( ) Recognizing danger situations (included triggers and roadblocks)                    ( ) Coping skills (new ways to manage stress,relaxation techniques, changing routine, distraction)                                     
Community Meeting Group Note        Date: 11/3/2024  Start Time: 9am  End Time: 0930      Number of Participants in Group & Unit Census:  6/7    Topic: Goal setting    Goal of Group:Set a short term goal for the day      Comments:     Patient did not participate in Community Meeting group, despite staff encouragement and explanation of benefits.  Patient remain seclusive to self.  Q15 minute safety checks maintained for patient safety and will continue to encourage patient to attend unit programming.     
Dad visited  Patient father visited and when staff walks him out he tells staff that he feels his son is doing very well, and sees a lot of improvement. He will be staying with him and he is asking when discharge would be and feels comfortable for him to come home.   
Discontinuation of Seclusion:  Pt walked to his room at 0510 was in seclusion for a total of 2 hours and 20min or 140 min. PT did not wish to discuss event with staff. PT offered food and fluids which he declined. PT went to the bathroom and then did allow staff to get vitals but refused a full physical assessment.  
Emergency PRN Medication Administration Note:      Patient is Agitated and Disruptive as evidence by attempting to elope. Staff attempted to find and relieve the distress by Talking to patient, Refocusing on new activity, and Administer PRN medications Patient is currently  in behavioral control, accepted PRN medications. Medication Administered as prescribed: haldol 5 mg benadryl 50 mg IM. Patient Tolerated medication administration.   Will continue to monitor, offer support, and reassess.  
Morning Orientation completed and patient was accepting of information.  
Morning Orientation completed during breakfast and patient is accepting of information and discussion.   
PT behaviors escalating verbally threatening one to one staff member telling her \"I'm going to get you when I get out and you will regret this\".and being sexually inappropriate banging on seclusion room door. Staff offered medications pt initially accepting and lays on mattress once staff approach pt starts clenching fists and pushing against mattress staff was able to administer haldol 5mg ativan 2mg and benadryl 50mg with much encouragement. Pt again explained criteria for release pt does not seem to understand and immediately starts verbally threatening staff. Medications given with 4 staff and pt advocate present.   
Patient educated and encouraged compliance with his medication regime but declined all medications. Patient is AO/3, ambulatory and denies blurred vision, headache and presents no abnormalities/concerns or changes in speech, facial symmetry.  
Patient given tobacco quitline number 18672701801 at this time, refusing to call at this time, states \" I just dont want to quit now\"- patient given information as to the dangers of long term tobacco use. Continue to reinforce the importance of tobacco cessation.    
Patient given tobacco quitline number 51082286436 at this time, refusing to call at this time, states \" I just dont want to quit now\"- patient given information as to the dangers of long term tobacco use. Continue to reinforce the importance of tobacco cessation.    
Patient given tobacco quitline number 88420079342 at this time, refusing to call at this time, states \" I just dont want to quit now\"- patient given information as to the dangers of long term tobacco use. Continue to reinforce the importance of tobacco cessation.    
Patient refused all scheduled HS medications. Patient was educated on the importance of medications compliance and patient continued ot refuse.  
Patient refused medications, patient was educated on the importance of medication compliance.   
Patient refused new blood pressure medication ordered at this time, patient educated on importance of blood pressure management and encouraged to take scheduled novasc at this time.   
Patient refused scheduled medications at this date and time.  
Post Restraint and Seclusion Patient Debriefing    Did debriefing occur? yes    If No, why not?  [] Patient refused  [] Patient is unavailable for debriefing due to:  [] Patient debriefing not completed due to clinical contraindication of:    What events led to the seclusion/restraint incident?      Did being restrained or in seclusion help you regain control of your behavior? yes,       Did you feel safe while you were in restraint or seclusion:      [x] Very Safe  [] Safe  [] Somewhat Safe  [] Not Safe     Did you have the chance to gain control of your behavior before you were secluded or restrained? yes    If Yes, how:    [x] Offered Medication  [x] Talked with  [] Given Time Out      [x] Offered alternatives to restraint/seclusion     During the restraint or seclusion process were you offered medicine to help you gain control? yes,       Were your physical and emotional needs met, and your privacy rights addressed while you were in restraints/seclusion? yes,       How can we assist you in remaining restraint or seclusion free in the future?      Is there anything else you would like to share regarding this restraint/seclusion episode?    Patient consented to family or significant other to participate in debriefing no    Patient's guardian participated in debriefing (when applicable) no    Patient's guardian unable to participate in debriefing (when applicable) no    Staff:  Were modifications to the treatment plan completed? yes,       
Post Restraint and Seclusion Patient Debriefing    Did debriefing occur? yes,     If No, why not?  [] Patient refused  [] Patient is unavailable for debriefing due to:  [] Patient debriefing not completed due to clinical contraindication of:    What events led to the seclusion/restraint incident?      Did being restrained or in seclusion help you regain control of your behavior? yes,       Did you feel safe while you were in restraint or seclusion:      [x] Very Safe  [] Safe  [] Somewhat Safe  [] Not Safe     Did you have the chance to gain control of your behavior before you were secluded or restrained? yes,     If Yes, how:    [x] Offered Medication  [x] Talked with  [] Given Time Out      [x] Offered alternatives to restraint/seclusion     During the restraint or seclusion process were you offered medicine to help you gain control? yes,       Were your physical and emotional needs met, and your privacy rights addressed while you were in restraints/seclusion? yes,       How can we assist you in remaining restraint or seclusion free in the future?      Is there anything else you would like to share regarding this restraint/seclusion episode?    Patient consented to family or significant other to participate in debriefing no    Patient's guardian participated in debriefing (when applicable) NA    Patient's guardian unable to participate in debriefing (when applicable) NA    Staff:  Were modifications to the treatment plan completed? yes,       
Refused  am labs   
Refused am labs  
Safety Drill completed - patient cooperative with process.   
While rounding pt is heard being vulgar to one to one staff asking her \"can I masturbate in front of you, or can you come in here with me\" pt not accepting of verbal redirection vulgar language continues.   
No  Memory: Poor recent  Insight and Judgment: No  Insight and Judgment: Poor judgment, Poor insight, Unrealistic    Tobacco Screening:  Practical Counseling, on admission, philip X, if applicable and completed (first 3 are required if patient doesn't refuse):            ( ) Recognizing danger situations (included triggers and roadblocks)                    ( ) Coping skills (new ways to manage stress,relaxation techniques, changing routine, distraction)                                                           ( ) Basic information about quitting (benefits of quitting, techniques in how to quit, available resources  ( ) [facility]                                                                                                                   ( x) Patient refused counseling  ( ) Patient has not smoked in the last 30 days    Metabolic Screening:    Lab Results   Component Value Date    LABA1C 5.3 07/05/2024       Lab Results   Component Value Date    CHOL 104 07/05/2024    CHOL 206 (H) 03/29/2023    CHOL 145 12/31/2019     Lab Results   Component Value Date    TRIG 64 07/05/2024    TRIG 62 03/29/2023    TRIG 36 12/31/2019     Lab Results   Component Value Date    HDL 30 (L) 07/05/2024    HDL 47 03/29/2023    HDL 44 12/31/2019     No components found for: \"LDLCAL\"  No components found for: \"LABVLDL\"      Body mass index is 31.19 kg/m².    BP Readings from Last 2 Encounters:   10/29/24 (!) 153/108   07/23/24 130/86         Pt admitted with followings belongings:  Dental Appliances: None  Vision - Corrective Lenses: Eyeglasses  Hearing Aid: None  Jewelry: Watch, Necklace  Body Piercings Removed: N/A  Clothing: Footwear, Socks, Shirt, Shorts  Other Valuables: Wallet, Credit/Debit Card  The following personal items were collected during admission. Items secured in locker/safe. Items will be returned to patient at discharge.     Karen Watters RN

## 2024-11-04 NOTE — DISCHARGE INSTR - DIET

## 2024-11-04 NOTE — DISCHARGE INSTRUCTIONS
Information:  Medications:   Medication summary provided   I understand that I should take only the medications on my list.     -why and when I need to take each medicine.     -which side effects to watch for.     -that I should carry my medication information at all times in case of     Emergency situations.    I will take all of my medicines to follow up appointments.     -check with my physician or pharmacist before taking any new    Medication, over the counter product or drink alcohol.    -Ask about food, drug or dietary supplement interactions.    -discard old lists and update records with medication providers.    Notify Physician:  Notify physician if you notice:   Always call 911 if you feel your life is in danger  In case of an emergency call 911 immediately!  If 911 is not available call your local emergency medical system for help    Behavioral Health Follow Up:  Original Referral Source:PPU  Discharge Diagnosis: Acute psychosis (HCC) [F23]  Recommendations for Level of Care: Follow up with community behavioral health center  Patient status at discharge: Patient stable denies thoughts of harming himself or others  My hospital  was: [facility]  Aftercare plan faxed: [facility]   -faxed by: Staff   -date: 11/4/2024   -time: 1600  Prescriptions: [facility]    Smoking: Quit Smoking.   Call the [employer]'s smoking quitline at 8-417-81O-QUIT  Know the signs of a heart attack   If you have any of the following symptoms call 911 immediately, do not wait more    Than five minutes.    1. Pressure, fullness and/ or squeezing in the center of the chest spreading to    The jaw, neck or shoulder.    2. Chest discomfort with light headedness, fainting, sweating, nausea or    Shortness of breath.   3. Upper abdominal pressure or discomfort.   4. Lower chest pain, back pain, unusual fatigue, shortness of breath, nausea   Or dizziness.     General Information:   Questions regarding your bill: Call HELP program ([phone])

## 2024-12-10 ENCOUNTER — OFFICE VISIT (OUTPATIENT)
Dept: NEUROLOGY | Age: 29
End: 2024-12-10
Payer: COMMERCIAL

## 2024-12-10 VITALS
BODY MASS INDEX: 33.16 KG/M2 | HEIGHT: 72 IN | HEART RATE: 90 BPM | SYSTOLIC BLOOD PRESSURE: 134 MMHG | DIASTOLIC BLOOD PRESSURE: 85 MMHG | WEIGHT: 244.8 LBS

## 2024-12-10 DIAGNOSIS — R41.3 MEMORY LOSS: Primary | ICD-10-CM

## 2024-12-10 DIAGNOSIS — F31.9 BIPOLAR 1 DISORDER (HCC): ICD-10-CM

## 2024-12-10 DIAGNOSIS — R41.89 PSEUDODEMENTIA: ICD-10-CM

## 2024-12-10 PROCEDURE — G8417 CALC BMI ABV UP PARAM F/U: HCPCS | Performed by: PSYCHIATRY & NEUROLOGY

## 2024-12-10 PROCEDURE — 3075F SYST BP GE 130 - 139MM HG: CPT | Performed by: PSYCHIATRY & NEUROLOGY

## 2024-12-10 PROCEDURE — 1036F TOBACCO NON-USER: CPT | Performed by: PSYCHIATRY & NEUROLOGY

## 2024-12-10 PROCEDURE — G8484 FLU IMMUNIZE NO ADMIN: HCPCS | Performed by: PSYCHIATRY & NEUROLOGY

## 2024-12-10 PROCEDURE — G8427 DOCREV CUR MEDS BY ELIG CLIN: HCPCS | Performed by: PSYCHIATRY & NEUROLOGY

## 2024-12-10 PROCEDURE — 99214 OFFICE O/P EST MOD 30 MIN: CPT | Performed by: PSYCHIATRY & NEUROLOGY

## 2024-12-10 PROCEDURE — 3079F DIAST BP 80-89 MM HG: CPT | Performed by: PSYCHIATRY & NEUROLOGY

## 2024-12-10 NOTE — PROGRESS NOTES
NEUROLOGY FOLLOW UP VISIT  Patient Name:       Bryant Pimentel  :        1995  Clinic Visit Date:    12/10/2024  LOV: 2024      Dear Anthony Perez, LAMONTE - NP     I saw Mr. Bryant Pimentel in neurology consultation today.   As you know he  is a 29 y.o.  male was initially seen in neurology consultation on 2024 for evaluation of memory difficulties.  Today is the first follow-up visit.  He is to clinic stating that \"nothing changed\" and also stated that he was hospitalized 2 months ago for psychiatric illness.  Otherwise offers no new complaints.  He again states that he does have memory difficulties described as forgetfulness.  He is able to do all of the activities without any help.  He is completely independent of basic ADLs such as brushing, bathing and dressing, etc.  He is able to use the telephone and carry on conversations okay.  He is able to do housework and laundry, etc.  He has not had any recent febrile illnesses since initial visit.        Initial consultation visit on 2024:  Patient presented to clinic as initial consultation for c/o memory difficulties for the past several weeks.    Patient is accompanied by his parents to the clinic.  Patient has comorbid schizophrenia and patient has been on aripiprazole.  Parents wanted to be evaluated for his ongoing memory difficulties.  His memory difficulties are described as word finding difficulties and patient needed to write everything down.  Patient has difficulty continuing conversations and also has been stuttering at times.  Patient is living with his parents.  Patient was diagnosed with bipolar disorder and schizophrenia about 10 years ago.  Regarding functional status; patient is completely independent of basic and most of the instrumental ADLs.  Patient needs help for cooking.  Patient does not drive.  Patient never had any history of stroke or seizures.  Patient's dad also stated that patient does not interact socially

## 2025-07-21 ENCOUNTER — HOSPITAL ENCOUNTER (EMERGENCY)
Age: 30
Discharge: HOME | End: 2025-07-21
Attending: EMERGENCY MEDICINE
Payer: COMMERCIAL

## 2025-07-21 ENCOUNTER — APPOINTMENT (OUTPATIENT)
Dept: GENERAL RADIOLOGY | Age: 30
End: 2025-07-21
Payer: COMMERCIAL

## 2025-07-21 VITALS
WEIGHT: 274 LBS | TEMPERATURE: 97.5 F | HEART RATE: 96 BPM | SYSTOLIC BLOOD PRESSURE: 132 MMHG | OXYGEN SATURATION: 99 % | DIASTOLIC BLOOD PRESSURE: 79 MMHG | RESPIRATION RATE: 14 BRPM | BODY MASS INDEX: 37.16 KG/M2

## 2025-07-21 DIAGNOSIS — S62.336A CLOSED DISPLACED FRACTURE OF NECK OF FIFTH METACARPAL BONE OF RIGHT HAND, INITIAL ENCOUNTER: Primary | ICD-10-CM

## 2025-07-21 PROCEDURE — 99283 EMERGENCY DEPT VISIT LOW MDM: CPT

## 2025-07-21 PROCEDURE — 29125 APPL SHORT ARM SPLINT STATIC: CPT

## 2025-07-21 PROCEDURE — 73130 X-RAY EXAM OF HAND: CPT

## 2025-07-21 ASSESSMENT — PAIN - FUNCTIONAL ASSESSMENT: PAIN_FUNCTIONAL_ASSESSMENT: 0-10

## 2025-07-21 ASSESSMENT — PAIN DESCRIPTION - ORIENTATION: ORIENTATION: RIGHT

## 2025-07-21 ASSESSMENT — PAIN DESCRIPTION - LOCATION: LOCATION: HAND

## 2025-07-21 ASSESSMENT — PAIN SCALES - GENERAL: PAINLEVEL_OUTOF10: 1

## 2025-07-21 NOTE — ED PROVIDER NOTES
eMERGENCY dEPARTMENT eNCOUnter   Independent Attestation     Pt Name: Bryant Pimentel  MRN: [id number]  Birthdate 1995  Date of evaluation: 7/21/25     Bryant Pimentel is a 29 y.o. male with CC: Hand Injury (R; onset a couple weeks ago, reports punching a window) and Skin Problem (Reports skin peeling off bilateral feet X weeks)        This visit was performed by both a physician and an APC. I performed all aspects of the MDM as documented.      Sisi Perla MD  Attending Emergency Physician            Sisi Perla MD  07/21/25 4032    
Percentile Temp Temp Source Pulse Respirations SpO2   07/21/25 1024 -- -- 07/21/25 1022 07/21/25 1022 07/21/25 1022 07/21/25 1022 07/21/25 1022   132/79   97.5 °F (36.4 °C) Oral 96 14 99 %      Height Weight - Scale         -- 07/21/25 1022          124.3 kg (274 lb)               Physical Exam  Vitals reviewed.   Constitutional:       General: He is not in acute distress.     Appearance: He is not ill-appearing.   HENT:      Mouth/Throat:      Mouth: Mucous membranes are moist.   Eyes:      Pupils: Pupils are equal, round, and reactive to light.   Cardiovascular:      Rate and Rhythm: Normal rate and regular rhythm.      Pulses: Normal pulses.   Pulmonary:      Effort: Pulmonary effort is normal. No respiratory distress.      Breath sounds: Normal breath sounds.   Musculoskeletal:      Right wrist: No swelling. Normal range of motion. Normal pulse.      Right hand: Swelling (Mild) and tenderness (Noted to the fifth metacarpal and fifth digit) present. Decreased range of motion (Fifth digit unable to fully extend). Normal sensation. Normal capillary refill. Normal pulse.      Cervical back: Neck supple.   Skin:     Capillary Refill: Capillary refill takes less than 2 seconds.      Coloration: Skin is not jaundiced.   Neurological:      Mental Status: He is alert and oriented to person, place, and time.      Motor: Motor function is intact.      Coordination: Coordination is intact.      Gait: Gait is intact.            DIAGNOSTIC RESULTS       RADIOLOGY:   Non-plain film images such as CT, Ultrasound and MRI are read by the radiologist. Plain radiographic images are visualized and preliminarily interpreted by the emergency physician with the below findings:    Interpretation per the Radiologist below, if available at the time of this note:    XR HAND RIGHT (MIN 3 VIEWS)  Result Date: 7/21/2025  EXAMINATION: THREE XRAY VIEWS OF THE RIGHT HAND 7/21/2025 10:51 am COMPARISON: None. HISTORY: ORDERING SYSTEM PROVIDED

## 2025-07-21 NOTE — DISCHARGE INSTRUCTIONS
Wear splint until follow-up with orthopedic.  OTC pain medication as discussed, examples include ibuprofen and Tylenol.    Call [phone] ([phone] to establish care for follow up.  You can also call [employer] at [phone] to establish care.

## 2025-07-22 ASSESSMENT — ENCOUNTER SYMPTOMS
SHORTNESS OF BREATH: 0
NAUSEA: 0
COLOR CHANGE: 0
CONSTIPATION: 0
DIARRHEA: 0
VOMITING: 0
CHEST TIGHTNESS: 0

## 2025-08-03 ENCOUNTER — HOSPITAL ENCOUNTER (EMERGENCY)
Age: 30
Discharge: HOME | End: 2025-08-03
Attending: EMERGENCY MEDICINE
Payer: COMMERCIAL

## 2025-08-03 VITALS
HEIGHT: 72 IN | OXYGEN SATURATION: 98 % | BODY MASS INDEX: 37.16 KG/M2 | DIASTOLIC BLOOD PRESSURE: 98 MMHG | HEART RATE: 91 BPM | RESPIRATION RATE: 16 BRPM | SYSTOLIC BLOOD PRESSURE: 154 MMHG | TEMPERATURE: 98.1 F

## 2025-08-03 DIAGNOSIS — S91.309A OPEN WOUND OF FOOT, UNSPECIFIED LATERALITY, INITIAL ENCOUNTER: Primary | ICD-10-CM

## 2025-08-03 PROCEDURE — 6370000000 HC RX 637 (ALT 250 FOR IP): Performed by: EMERGENCY MEDICINE

## 2025-08-03 PROCEDURE — 99283 EMERGENCY DEPT VISIT LOW MDM: CPT

## 2025-08-03 RX ORDER — GINSENG 100 MG
CAPSULE ORAL 3 TIMES DAILY
Status: DISCONTINUED | OUTPATIENT
Start: 2025-08-03 | End: 2025-08-03 | Stop reason: HOSPADM

## 2025-08-03 RX ADMIN — BACITRACIN: 500 OINTMENT TOPICAL at 09:47

## 2025-08-03 ASSESSMENT — PAIN DESCRIPTION - ORIENTATION: ORIENTATION: RIGHT;LEFT

## 2025-08-03 ASSESSMENT — PAIN - FUNCTIONAL ASSESSMENT: PAIN_FUNCTIONAL_ASSESSMENT: 0-10

## 2025-08-03 ASSESSMENT — PAIN DESCRIPTION - LOCATION: LOCATION: FOOT

## 2025-08-03 ASSESSMENT — PAIN SCALES - GENERAL: PAINLEVEL_OUTOF10: 6

## 2025-08-03 ASSESSMENT — PAIN DESCRIPTION - PAIN TYPE: TYPE: ACUTE PAIN

## 2025-08-03 ASSESSMENT — PAIN DESCRIPTION - FREQUENCY: FREQUENCY: CONTINUOUS

## 2025-08-03 ASSESSMENT — PAIN DESCRIPTION - DESCRIPTORS: DESCRIPTORS: ACHING;DISCOMFORT

## 2025-08-05 ENCOUNTER — HOSPITAL ENCOUNTER (EMERGENCY)
Age: 30
Discharge: HOME | End: 2025-08-05
Payer: COMMERCIAL

## 2025-08-05 VITALS
WEIGHT: 258.5 LBS | SYSTOLIC BLOOD PRESSURE: 161 MMHG | DIASTOLIC BLOOD PRESSURE: 90 MMHG | TEMPERATURE: 98.2 F | HEART RATE: 72 BPM | BODY MASS INDEX: 35.01 KG/M2 | HEIGHT: 72 IN | RESPIRATION RATE: 18 BRPM | OXYGEN SATURATION: 98 %

## 2025-08-05 DIAGNOSIS — I10 ESSENTIAL HYPERTENSION: ICD-10-CM

## 2025-08-05 DIAGNOSIS — Z76.0 ENCOUNTER FOR MEDICATION REFILL: Primary | ICD-10-CM

## 2025-08-05 PROCEDURE — 99283 EMERGENCY DEPT VISIT LOW MDM: CPT

## 2025-08-05 RX ORDER — OLANZAPINE 10 MG/1
10 TABLET, ORALLY DISINTEGRATING ORAL NIGHTLY
Qty: 30 TABLET | Refills: 0 | Status: SHIPPED | OUTPATIENT
Start: 2025-08-05

## 2025-08-05 RX ORDER — ATORVASTATIN CALCIUM 20 MG/1
20 TABLET, FILM COATED ORAL NIGHTLY
Qty: 30 TABLET | Refills: 0 | Status: SHIPPED | OUTPATIENT
Start: 2025-08-05

## 2025-08-05 RX ORDER — OLANZAPINE 10 MG/1
TABLET, ORALLY DISINTEGRATING ORAL
COMMUNITY
Start: 2025-07-14 | End: 2025-08-05

## 2025-08-05 RX ORDER — CLONAZEPAM 1 MG/1
TABLET ORAL
COMMUNITY
Start: 2025-07-14

## 2025-08-05 RX ORDER — ARIPIPRAZOLE 10 MG/1
10 TABLET ORAL DAILY
Qty: 30 TABLET | Refills: 0 | Status: SHIPPED | OUTPATIENT
Start: 2025-08-05

## 2025-08-05 RX ORDER — LOSARTAN POTASSIUM AND HYDROCHLOROTHIAZIDE 12.5; 5 MG/1; MG/1
1 TABLET ORAL DAILY
Qty: 30 TABLET | Refills: 0 | Status: SHIPPED | OUTPATIENT
Start: 2025-08-05

## 2025-08-05 RX ORDER — AMLODIPINE BESYLATE 10 MG/1
10 TABLET ORAL DAILY
Qty: 30 TABLET | Refills: 0 | Status: SHIPPED | OUTPATIENT
Start: 2025-08-05

## 2025-08-05 ASSESSMENT — PAIN - FUNCTIONAL ASSESSMENT: PAIN_FUNCTIONAL_ASSESSMENT: NONE - DENIES PAIN

## 2025-08-05 ASSESSMENT — ENCOUNTER SYMPTOMS
NAUSEA: 0
BACK PAIN: 0
SHORTNESS OF BREATH: 0
CHEST TIGHTNESS: 0
COLOR CHANGE: 0
VOMITING: 0
ABDOMINAL PAIN: 0
WHEEZING: 0

## 2025-08-06 ENCOUNTER — APPOINTMENT (OUTPATIENT)
Dept: GENERAL RADIOLOGY | Age: 30
End: 2025-08-06
Payer: COMMERCIAL

## 2025-08-06 ENCOUNTER — HOSPITAL ENCOUNTER (EMERGENCY)
Age: 30
Discharge: HOME | End: 2025-08-06
Payer: COMMERCIAL

## 2025-08-06 VITALS
BODY MASS INDEX: 35.83 KG/M2 | TEMPERATURE: 98.6 F | WEIGHT: 264.5 LBS | RESPIRATION RATE: 16 BRPM | HEART RATE: 88 BPM | HEIGHT: 72 IN | OXYGEN SATURATION: 95 %

## 2025-08-06 DIAGNOSIS — S62.396A CLOSED DISPLACED FRACTURE OF OTHER PART OF FIFTH METACARPAL BONE OF RIGHT HAND, INITIAL ENCOUNTER: Primary | ICD-10-CM

## 2025-08-06 PROCEDURE — 73130 X-RAY EXAM OF HAND: CPT

## 2025-08-06 PROCEDURE — 99283 EMERGENCY DEPT VISIT LOW MDM: CPT

## 2025-08-06 RX ORDER — ACETAMINOPHEN 500 MG
500 TABLET ORAL 4 TIMES DAILY PRN
Qty: 60 TABLET | Refills: 1 | Status: SHIPPED | OUTPATIENT
Start: 2025-08-06

## 2025-08-06 RX ORDER — IBUPROFEN 600 MG/1
600 TABLET, FILM COATED ORAL EVERY 6 HOURS PRN
Qty: 120 TABLET | Refills: 0 | Status: SHIPPED | OUTPATIENT
Start: 2025-08-06

## 2025-08-07 ENCOUNTER — HOSPITAL ENCOUNTER (EMERGENCY)
Age: 30
Discharge: HOME | End: 2025-08-07
Payer: COMMERCIAL

## 2025-08-07 VITALS
DIASTOLIC BLOOD PRESSURE: 87 MMHG | BODY MASS INDEX: 35.21 KG/M2 | RESPIRATION RATE: 16 BRPM | TEMPERATURE: 98.4 F | HEART RATE: 101 BPM | OXYGEN SATURATION: 99 % | SYSTOLIC BLOOD PRESSURE: 161 MMHG | HEIGHT: 72 IN | WEIGHT: 260 LBS

## 2025-08-07 DIAGNOSIS — S90.229A SUBUNGUAL HEMATOMA OF LESSER TOE: ICD-10-CM

## 2025-08-07 DIAGNOSIS — S90.829A: Primary | ICD-10-CM

## 2025-08-07 PROCEDURE — 99282 EMERGENCY DEPT VISIT SF MDM: CPT

## 2025-08-07 ASSESSMENT — PAIN SCALES - GENERAL: PAINLEVEL_OUTOF10: 5

## 2025-08-07 ASSESSMENT — PAIN - FUNCTIONAL ASSESSMENT: PAIN_FUNCTIONAL_ASSESSMENT: 0-10

## 2025-08-13 ENCOUNTER — TELEPHONE (OUTPATIENT)
Age: 30
End: 2025-08-13

## 2025-08-20 ENCOUNTER — HOSPITAL ENCOUNTER (INPATIENT)
Age: 30
LOS: 7 days | Discharge: HOME | DRG: 750 | End: 2025-08-27
Attending: EMERGENCY MEDICINE | Admitting: PSYCHIATRY & NEUROLOGY
Payer: COMMERCIAL

## 2025-08-20 DIAGNOSIS — F23 ACUTE PSYCHOSIS (HCC): Primary | ICD-10-CM

## 2025-08-20 DIAGNOSIS — I10 ESSENTIAL HYPERTENSION: ICD-10-CM

## 2025-08-20 PROCEDURE — 2040000000 HC PSYCH ICU R&B

## 2025-08-20 PROCEDURE — 6370000000 HC RX 637 (ALT 250 FOR IP)

## 2025-08-20 PROCEDURE — 6370000000 HC RX 637 (ALT 250 FOR IP): Performed by: EMERGENCY MEDICINE

## 2025-08-20 PROCEDURE — 93005 ELECTROCARDIOGRAM TRACING: CPT

## 2025-08-20 PROCEDURE — 6370000000 HC RX 637 (ALT 250 FOR IP): Performed by: NURSE PRACTITIONER

## 2025-08-20 PROCEDURE — 99223 1ST HOSP IP/OBS HIGH 75: CPT

## 2025-08-20 PROCEDURE — 99285 EMERGENCY DEPT VISIT HI MDM: CPT

## 2025-08-20 RX ORDER — LORAZEPAM 2 MG/ML
2 INJECTION INTRAMUSCULAR EVERY 6 HOURS PRN
Status: DISCONTINUED | OUTPATIENT
Start: 2025-08-20 | End: 2025-08-20 | Stop reason: SDUPTHER

## 2025-08-20 RX ORDER — HYDROCHLOROTHIAZIDE 25 MG/1
12.5 TABLET ORAL DAILY
Status: DISCONTINUED | OUTPATIENT
Start: 2025-08-20 | End: 2025-08-23

## 2025-08-20 RX ORDER — IBUPROFEN 400 MG/1
400 TABLET, FILM COATED ORAL EVERY 6 HOURS PRN
Status: DISCONTINUED | OUTPATIENT
Start: 2025-08-20 | End: 2025-08-27 | Stop reason: HOSPADM

## 2025-08-20 RX ORDER — HYDROXYZINE HYDROCHLORIDE 50 MG/1
50 TABLET, FILM COATED ORAL 3 TIMES DAILY PRN
Status: DISCONTINUED | OUTPATIENT
Start: 2025-08-20 | End: 2025-08-27 | Stop reason: HOSPADM

## 2025-08-20 RX ORDER — LOSARTAN POTASSIUM 50 MG/1
50 TABLET ORAL DAILY
Status: DISCONTINUED | OUTPATIENT
Start: 2025-08-20 | End: 2025-08-27 | Stop reason: HOSPADM

## 2025-08-20 RX ORDER — HALOPERIDOL 5 MG/ML
5 INJECTION INTRAMUSCULAR EVERY 4 HOURS PRN
Status: DISCONTINUED | OUTPATIENT
Start: 2025-08-20 | End: 2025-08-20 | Stop reason: SDUPTHER

## 2025-08-20 RX ORDER — HYDRALAZINE HYDROCHLORIDE 10 MG/1
10 TABLET, FILM COATED ORAL EVERY 8 HOURS PRN
Status: DISCONTINUED | OUTPATIENT
Start: 2025-08-20 | End: 2025-08-27 | Stop reason: HOSPADM

## 2025-08-20 RX ORDER — POLYETHYLENE GLYCOL 3350 17 G/17G
17 POWDER, FOR SOLUTION ORAL DAILY PRN
Status: DISCONTINUED | OUTPATIENT
Start: 2025-08-20 | End: 2025-08-20

## 2025-08-20 RX ORDER — TRAZODONE HYDROCHLORIDE 50 MG/1
50 TABLET ORAL NIGHTLY
Status: ON HOLD | COMMUNITY
End: 2025-08-27

## 2025-08-20 RX ORDER — OLANZAPINE 15 MG/1
15 TABLET, ORALLY DISINTEGRATING ORAL NIGHTLY
Status: ON HOLD | COMMUNITY
End: 2025-08-27 | Stop reason: HOSPADM

## 2025-08-20 RX ORDER — LORAZEPAM 1 MG/1
2 TABLET ORAL EVERY 4 HOURS PRN
Status: DISCONTINUED | OUTPATIENT
Start: 2025-08-20 | End: 2025-08-20 | Stop reason: SDUPTHER

## 2025-08-20 RX ORDER — MAGNESIUM HYDROXIDE/ALUMINUM HYDROXICE/SIMETHICONE 120; 1200; 1200 MG/30ML; MG/30ML; MG/30ML
30 SUSPENSION ORAL EVERY 6 HOURS PRN
Status: DISCONTINUED | OUTPATIENT
Start: 2025-08-20 | End: 2025-08-27 | Stop reason: HOSPADM

## 2025-08-20 RX ORDER — HALOPERIDOL 5 MG/1
5 TABLET ORAL EVERY 6 HOURS PRN
Status: DISCONTINUED | OUTPATIENT
Start: 2025-08-20 | End: 2025-08-27 | Stop reason: HOSPADM

## 2025-08-20 RX ORDER — AMLODIPINE BESYLATE 10 MG/1
10 TABLET ORAL DAILY
Status: DISCONTINUED | OUTPATIENT
Start: 2025-08-20 | End: 2025-08-27 | Stop reason: HOSPADM

## 2025-08-20 RX ORDER — ACETAMINOPHEN 325 MG/1
650 TABLET ORAL EVERY 4 HOURS PRN
Status: DISCONTINUED | OUTPATIENT
Start: 2025-08-20 | End: 2025-08-27 | Stop reason: HOSPADM

## 2025-08-20 RX ORDER — DIPHENHYDRAMINE HYDROCHLORIDE 50 MG/ML
50 INJECTION, SOLUTION INTRAMUSCULAR; INTRAVENOUS EVERY 6 HOURS PRN
Status: DISCONTINUED | OUTPATIENT
Start: 2025-08-20 | End: 2025-08-27 | Stop reason: HOSPADM

## 2025-08-20 RX ORDER — HALOPERIDOL 5 MG/ML
5 INJECTION INTRAMUSCULAR EVERY 6 HOURS PRN
Status: DISCONTINUED | OUTPATIENT
Start: 2025-08-20 | End: 2025-08-27 | Stop reason: HOSPADM

## 2025-08-20 RX ORDER — MAGNESIUM HYDROXIDE/ALUMINUM HYDROXICE/SIMETHICONE 120; 1200; 1200 MG/30ML; MG/30ML; MG/30ML
30 SUSPENSION ORAL EVERY 6 HOURS PRN
Status: DISCONTINUED | OUTPATIENT
Start: 2025-08-20 | End: 2025-08-20

## 2025-08-20 RX ORDER — LORAZEPAM 2 MG/ML
2 INJECTION INTRAMUSCULAR EVERY 4 HOURS PRN
Status: DISCONTINUED | OUTPATIENT
Start: 2025-08-20 | End: 2025-08-21

## 2025-08-20 RX ORDER — BENZTROPINE MESYLATE 1 MG/ML
2 INJECTION, SOLUTION INTRAMUSCULAR; INTRAVENOUS 2 TIMES DAILY PRN
Status: DISCONTINUED | OUTPATIENT
Start: 2025-08-20 | End: 2025-08-27 | Stop reason: HOSPADM

## 2025-08-20 RX ORDER — HALOPERIDOL 5 MG/1
5 TABLET ORAL EVERY 4 HOURS PRN
Status: DISCONTINUED | OUTPATIENT
Start: 2025-08-20 | End: 2025-08-20

## 2025-08-20 RX ORDER — LOSARTAN POTASSIUM AND HYDROCHLOROTHIAZIDE 12.5; 5 MG/1; MG/1
1 TABLET ORAL DAILY
Status: DISCONTINUED | OUTPATIENT
Start: 2025-08-21 | End: 2025-08-20 | Stop reason: SDUPTHER

## 2025-08-20 RX ORDER — POLYETHYLENE GLYCOL 3350 17 G
2 POWDER IN PACKET (EA) ORAL
Status: DISCONTINUED | OUTPATIENT
Start: 2025-08-20 | End: 2025-08-27 | Stop reason: HOSPADM

## 2025-08-20 RX ORDER — ACETAMINOPHEN 325 MG/1
650 TABLET ORAL EVERY 4 HOURS PRN
Status: DISCONTINUED | OUTPATIENT
Start: 2025-08-20 | End: 2025-08-20

## 2025-08-20 RX ORDER — IBUPROFEN 400 MG/1
400 TABLET, FILM COATED ORAL EVERY 6 HOURS PRN
Status: DISCONTINUED | OUTPATIENT
Start: 2025-08-20 | End: 2025-08-20

## 2025-08-20 RX ORDER — ARIPIPRAZOLE 960 MG/3.2ML
960 INJECTION, SUSPENSION, EXTENDED RELEASE INTRAMUSCULAR
Status: ON HOLD | COMMUNITY
End: 2025-08-27 | Stop reason: HOSPADM

## 2025-08-20 RX ORDER — TRAZODONE HYDROCHLORIDE 50 MG/1
50 TABLET ORAL NIGHTLY
Status: DISCONTINUED | OUTPATIENT
Start: 2025-08-20 | End: 2025-08-27 | Stop reason: HOSPADM

## 2025-08-20 RX ORDER — LORAZEPAM 1 MG/1
2 TABLET ORAL EVERY 6 HOURS PRN
Status: DISCONTINUED | OUTPATIENT
Start: 2025-08-20 | End: 2025-08-21

## 2025-08-20 RX ORDER — LOSARTAN POTASSIUM AND HYDROCHLOROTHIAZIDE 12.5; 5 MG/1; MG/1
1 TABLET ORAL DAILY
Status: DISCONTINUED | OUTPATIENT
Start: 2025-08-20 | End: 2025-08-20

## 2025-08-20 RX ORDER — POLYETHYLENE GLYCOL 3350 17 G
2 POWDER IN PACKET (EA) ORAL
Status: DISCONTINUED | OUTPATIENT
Start: 2025-08-20 | End: 2025-08-20

## 2025-08-20 RX ORDER — OLANZAPINE 10 MG/1
15 TABLET, ORALLY DISINTEGRATING ORAL NIGHTLY
Status: DISCONTINUED | OUTPATIENT
Start: 2025-08-20 | End: 2025-08-21

## 2025-08-20 RX ADMIN — OLANZAPINE 15 MG: 10 TABLET, ORALLY DISINTEGRATING ORAL at 21:43

## 2025-08-20 RX ADMIN — Medication 3 MG: at 21:43

## 2025-08-20 RX ADMIN — NICOTINE POLACRILEX 2 MG: 2 LOZENGE ORAL at 21:51

## 2025-08-20 RX ADMIN — AMLODIPINE BESYLATE 10 MG: 10 TABLET ORAL at 17:35

## 2025-08-20 RX ADMIN — LOSARTAN POTASSIUM 50 MG: 50 TABLET, FILM COATED ORAL at 17:36

## 2025-08-20 RX ADMIN — NICOTINE POLACRILEX 2 MG: 2 LOZENGE ORAL at 14:59

## 2025-08-20 RX ADMIN — HYDROXYZINE HYDROCHLORIDE 50 MG: 50 TABLET ORAL at 21:43

## 2025-08-20 RX ADMIN — HYDROCHLOROTHIAZIDE 12.5 MG: 25 TABLET ORAL at 17:35

## 2025-08-20 RX ADMIN — NICOTINE POLACRILEX 2 MG: 2 LOZENGE ORAL at 18:23

## 2025-08-20 ASSESSMENT — PATIENT HEALTH QUESTIONNAIRE - PHQ9
SUM OF ALL RESPONSES TO PHQ QUESTIONS 1-9: 2
SUM OF ALL RESPONSES TO PHQ QUESTIONS 1-9: 2
2. FEELING DOWN, DEPRESSED OR HOPELESS: SEVERAL DAYS
1. LITTLE INTEREST OR PLEASURE IN DOING THINGS: SEVERAL DAYS
SUM OF ALL RESPONSES TO PHQ QUESTIONS 1-9: 2
SUM OF ALL RESPONSES TO PHQ QUESTIONS 1-9: 2

## 2025-08-20 ASSESSMENT — SLEEP AND FATIGUE QUESTIONNAIRES
DO YOU HAVE DIFFICULTY SLEEPING: NO
DO YOU USE A SLEEP AID: NO
DO YOU HAVE DIFFICULTY SLEEPING: NO
DO YOU USE A SLEEP AID: NO
AVERAGE NUMBER OF SLEEP HOURS: 4

## 2025-08-20 ASSESSMENT — PAIN - FUNCTIONAL ASSESSMENT: PAIN_FUNCTIONAL_ASSESSMENT: 0-10

## 2025-08-20 ASSESSMENT — PAIN SCALES - GENERAL: PAINLEVEL_OUTOF10: 0

## 2025-08-21 PROCEDURE — 6370000000 HC RX 637 (ALT 250 FOR IP): Performed by: PSYCHIATRY & NEUROLOGY

## 2025-08-21 PROCEDURE — 6370000000 HC RX 637 (ALT 250 FOR IP): Performed by: NURSE PRACTITIONER

## 2025-08-21 PROCEDURE — 6370000000 HC RX 637 (ALT 250 FOR IP)

## 2025-08-21 PROCEDURE — 2040000000 HC PSYCH ICU R&B

## 2025-08-21 PROCEDURE — APPSS30 APP SPLIT SHARED TIME 16-30 MINUTES

## 2025-08-21 PROCEDURE — 6370000000 HC RX 637 (ALT 250 FOR IP): Performed by: EMERGENCY MEDICINE

## 2025-08-21 RX ORDER — RISPERIDONE 1 MG/1
1 TABLET, ORALLY DISINTEGRATING ORAL 2 TIMES DAILY
Status: DISCONTINUED | OUTPATIENT
Start: 2025-08-21 | End: 2025-08-22

## 2025-08-21 RX ORDER — ATORVASTATIN CALCIUM 20 MG/1
20 TABLET, FILM COATED ORAL NIGHTLY
Status: DISCONTINUED | OUTPATIENT
Start: 2025-08-21 | End: 2025-08-27 | Stop reason: HOSPADM

## 2025-08-21 RX ADMIN — HYDROXYZINE HYDROCHLORIDE 50 MG: 50 TABLET ORAL at 16:34

## 2025-08-21 RX ADMIN — HALOPERIDOL 5 MG: 5 TABLET ORAL at 17:11

## 2025-08-21 RX ADMIN — TRAZODONE HYDROCHLORIDE 50 MG: 50 TABLET ORAL at 21:25

## 2025-08-21 RX ADMIN — RISPERIDONE 1 MG: 1 TABLET, ORALLY DISINTEGRATING ORAL at 11:21

## 2025-08-21 RX ADMIN — LOSARTAN POTASSIUM 50 MG: 50 TABLET, FILM COATED ORAL at 07:45

## 2025-08-21 RX ADMIN — RISPERIDONE 1 MG: 1 TABLET, ORALLY DISINTEGRATING ORAL at 21:25

## 2025-08-21 RX ADMIN — NICOTINE POLACRILEX 2 MG: 2 LOZENGE ORAL at 16:35

## 2025-08-21 RX ADMIN — HYDROCHLOROTHIAZIDE 12.5 MG: 25 TABLET ORAL at 07:45

## 2025-08-21 RX ADMIN — Medication 3 MG: at 21:25

## 2025-08-21 RX ADMIN — AMLODIPINE BESYLATE 10 MG: 10 TABLET ORAL at 07:46

## 2025-08-21 RX ADMIN — ATORVASTATIN CALCIUM 20 MG: 20 TABLET, FILM COATED ORAL at 21:25

## 2025-08-21 RX ADMIN — NICOTINE POLACRILEX 2 MG: 2 LOZENGE ORAL at 08:46

## 2025-08-21 RX ADMIN — HALOPERIDOL 5 MG: 5 TABLET ORAL at 12:02

## 2025-08-21 ASSESSMENT — LIFESTYLE VARIABLES
HOW MANY STANDARD DRINKS CONTAINING ALCOHOL DO YOU HAVE ON A TYPICAL DAY: PATIENT DECLINED
HOW MANY STANDARD DRINKS CONTAINING ALCOHOL DO YOU HAVE ON A TYPICAL DAY: PATIENT DECLINED
HOW OFTEN DO YOU HAVE A DRINK CONTAINING ALCOHOL: PATIENT DECLINED
HOW OFTEN DO YOU HAVE A DRINK CONTAINING ALCOHOL: PATIENT DECLINED

## 2025-08-22 LAB
EKG ATRIAL RATE: 76 BPM
EKG P AXIS: 39 DEGREES
EKG P-R INTERVAL: 132 MS
EKG Q-T INTERVAL: 386 MS
EKG QRS DURATION: 82 MS
EKG QTC CALCULATION (BAZETT): 434 MS
EKG R AXIS: 72 DEGREES
EKG T AXIS: 20 DEGREES
EKG VENTRICULAR RATE: 76 BPM

## 2025-08-22 PROCEDURE — 6370000000 HC RX 637 (ALT 250 FOR IP)

## 2025-08-22 PROCEDURE — 6370000000 HC RX 637 (ALT 250 FOR IP): Performed by: PSYCHIATRY & NEUROLOGY

## 2025-08-22 PROCEDURE — 6370000000 HC RX 637 (ALT 250 FOR IP): Performed by: NURSE PRACTITIONER

## 2025-08-22 PROCEDURE — APPSS30 APP SPLIT SHARED TIME 16-30 MINUTES

## 2025-08-22 PROCEDURE — 2040000000 HC PSYCH ICU R&B

## 2025-08-22 RX ORDER — RISPERIDONE 1 MG/1
2 TABLET, ORALLY DISINTEGRATING ORAL 2 TIMES DAILY
Status: DISCONTINUED | OUTPATIENT
Start: 2025-08-22 | End: 2025-08-24

## 2025-08-22 RX ADMIN — HYDROCHLOROTHIAZIDE 12.5 MG: 25 TABLET ORAL at 08:09

## 2025-08-22 RX ADMIN — ATORVASTATIN CALCIUM 20 MG: 20 TABLET, FILM COATED ORAL at 21:16

## 2025-08-22 RX ADMIN — HYDROXYZINE HYDROCHLORIDE 50 MG: 50 TABLET ORAL at 01:40

## 2025-08-22 RX ADMIN — LOSARTAN POTASSIUM 50 MG: 50 TABLET, FILM COATED ORAL at 08:09

## 2025-08-22 RX ADMIN — HALOPERIDOL 5 MG: 5 TABLET ORAL at 01:44

## 2025-08-22 RX ADMIN — RISPERIDONE 1 MG: 1 TABLET, ORALLY DISINTEGRATING ORAL at 08:09

## 2025-08-22 RX ADMIN — AMLODIPINE BESYLATE 10 MG: 10 TABLET ORAL at 08:09

## 2025-08-22 RX ADMIN — RISPERIDONE 2 MG: 1 TABLET, ORALLY DISINTEGRATING ORAL at 21:16

## 2025-08-22 ASSESSMENT — PAIN SCALES - GENERAL: PAINLEVEL_OUTOF10: 0

## 2025-08-23 PROCEDURE — APPSS30 APP SPLIT SHARED TIME 16-30 MINUTES

## 2025-08-23 PROCEDURE — 99222 1ST HOSP IP/OBS MODERATE 55: CPT | Performed by: INTERNAL MEDICINE

## 2025-08-23 PROCEDURE — 6370000000 HC RX 637 (ALT 250 FOR IP): Performed by: NURSE PRACTITIONER

## 2025-08-23 PROCEDURE — 2040000000 HC PSYCH ICU R&B

## 2025-08-23 PROCEDURE — 6360000002 HC RX W HCPCS: Performed by: NURSE PRACTITIONER

## 2025-08-23 PROCEDURE — 6370000000 HC RX 637 (ALT 250 FOR IP): Performed by: PSYCHIATRY & NEUROLOGY

## 2025-08-23 PROCEDURE — 6370000000 HC RX 637 (ALT 250 FOR IP)

## 2025-08-23 RX ADMIN — Medication 3 MG: at 20:54

## 2025-08-23 RX ADMIN — HALOPERIDOL LACTATE 5 MG: 5 INJECTION, SOLUTION INTRAMUSCULAR at 17:10

## 2025-08-23 RX ADMIN — RISPERIDONE 2 MG: 1 TABLET, ORALLY DISINTEGRATING ORAL at 20:54

## 2025-08-23 RX ADMIN — ATORVASTATIN CALCIUM 20 MG: 20 TABLET, FILM COATED ORAL at 20:54

## 2025-08-23 RX ADMIN — DIPHENHYDRAMINE HYDROCHLORIDE 50 MG: 50 INJECTION INTRAMUSCULAR; INTRAVENOUS at 17:10

## 2025-08-23 ASSESSMENT — PAIN SCALES - GENERAL: PAINLEVEL_OUTOF10: 0

## 2025-08-24 LAB
CHOLEST SERPL-MCNC: 97 MG/DL (ref 0–199)
CHOLESTEROL/HDL RATIO: 2.6
EST. AVERAGE GLUCOSE BLD GHB EST-MCNC: 100 MG/DL
HBA1C MFR BLD: 5.1 % (ref 4–6)
HDLC SERPL-MCNC: 38 MG/DL
LDLC SERPL CALC-MCNC: 50 MG/DL (ref 0–100)
TRIGL SERPL-MCNC: 44 MG/DL (ref 0–149)

## 2025-08-24 PROCEDURE — 80061 LIPID PANEL: CPT

## 2025-08-24 PROCEDURE — 6370000000 HC RX 637 (ALT 250 FOR IP): Performed by: NURSE PRACTITIONER

## 2025-08-24 PROCEDURE — 6370000000 HC RX 637 (ALT 250 FOR IP): Performed by: PSYCHIATRY & NEUROLOGY

## 2025-08-24 PROCEDURE — 6360000002 HC RX W HCPCS: Performed by: NURSE PRACTITIONER

## 2025-08-24 PROCEDURE — 2040000000 HC PSYCH ICU R&B

## 2025-08-24 PROCEDURE — 83036 HEMOGLOBIN GLYCOSYLATED A1C: CPT

## 2025-08-24 PROCEDURE — 99231 SBSQ HOSP IP/OBS SF/LOW 25: CPT | Performed by: INTERNAL MEDICINE

## 2025-08-24 PROCEDURE — 6370000000 HC RX 637 (ALT 250 FOR IP)

## 2025-08-24 PROCEDURE — 36415 COLL VENOUS BLD VENIPUNCTURE: CPT

## 2025-08-24 PROCEDURE — APPSS30 APP SPLIT SHARED TIME 16-30 MINUTES

## 2025-08-24 RX ORDER — RISPERIDONE 1 MG/1
3 TABLET, ORALLY DISINTEGRATING ORAL 2 TIMES DAILY
Status: DISCONTINUED | OUTPATIENT
Start: 2025-08-24 | End: 2025-08-26

## 2025-08-24 RX ADMIN — RISPERIDONE 2 MG: 1 TABLET, ORALLY DISINTEGRATING ORAL at 08:24

## 2025-08-24 RX ADMIN — RISPERIDONE 3 MG: 1 TABLET, ORALLY DISINTEGRATING ORAL at 22:29

## 2025-08-24 RX ADMIN — LOSARTAN POTASSIUM 50 MG: 50 TABLET, FILM COATED ORAL at 08:24

## 2025-08-24 RX ADMIN — TRAZODONE HYDROCHLORIDE 50 MG: 50 TABLET ORAL at 22:29

## 2025-08-24 RX ADMIN — HYDRALAZINE HYDROCHLORIDE 10 MG: 10 TABLET, FILM COATED ORAL at 19:28

## 2025-08-24 RX ADMIN — DIPHENHYDRAMINE HYDROCHLORIDE 50 MG: 50 INJECTION INTRAMUSCULAR; INTRAVENOUS at 09:30

## 2025-08-24 RX ADMIN — HYDROXYZINE HYDROCHLORIDE 50 MG: 50 TABLET ORAL at 01:42

## 2025-08-24 RX ADMIN — ATORVASTATIN CALCIUM 20 MG: 20 TABLET, FILM COATED ORAL at 22:29

## 2025-08-24 RX ADMIN — HALOPERIDOL LACTATE 5 MG: 5 INJECTION, SOLUTION INTRAMUSCULAR at 09:30

## 2025-08-24 RX ADMIN — Medication 3 MG: at 22:29

## 2025-08-24 RX ADMIN — HALOPERIDOL 5 MG: 5 TABLET ORAL at 01:43

## 2025-08-24 RX ADMIN — TRAZODONE HYDROCHLORIDE 50 MG: 50 TABLET ORAL at 01:42

## 2025-08-24 RX ADMIN — AMLODIPINE BESYLATE 10 MG: 10 TABLET ORAL at 08:24

## 2025-08-24 ASSESSMENT — PAIN SCALES - GENERAL
PAINLEVEL_OUTOF10: 0
PAINLEVEL_OUTOF10: 0

## 2025-08-25 PROCEDURE — 99232 SBSQ HOSP IP/OBS MODERATE 35: CPT | Performed by: PSYCHIATRY & NEUROLOGY

## 2025-08-25 PROCEDURE — 2040000000 HC PSYCH ICU R&B

## 2025-08-25 PROCEDURE — APPSS30 APP SPLIT SHARED TIME 16-30 MINUTES

## 2025-08-25 PROCEDURE — 6370000000 HC RX 637 (ALT 250 FOR IP): Performed by: NURSE PRACTITIONER

## 2025-08-25 PROCEDURE — 6370000000 HC RX 637 (ALT 250 FOR IP): Performed by: PSYCHIATRY & NEUROLOGY

## 2025-08-25 PROCEDURE — 6370000000 HC RX 637 (ALT 250 FOR IP)

## 2025-08-25 RX ADMIN — HYDROXYZINE HYDROCHLORIDE 50 MG: 50 TABLET ORAL at 20:56

## 2025-08-25 RX ADMIN — RISPERIDONE 3 MG: 1 TABLET, ORALLY DISINTEGRATING ORAL at 20:58

## 2025-08-25 RX ADMIN — ATORVASTATIN CALCIUM 20 MG: 20 TABLET, FILM COATED ORAL at 20:58

## 2025-08-25 ASSESSMENT — LIFESTYLE VARIABLES
HOW OFTEN DO YOU HAVE A DRINK CONTAINING ALCOHOL: PATIENT DECLINED
HOW MANY STANDARD DRINKS CONTAINING ALCOHOL DO YOU HAVE ON A TYPICAL DAY: PATIENT DECLINED

## 2025-08-26 PROCEDURE — 6370000000 HC RX 637 (ALT 250 FOR IP): Performed by: EMERGENCY MEDICINE

## 2025-08-26 PROCEDURE — 6360000002 HC RX W HCPCS: Performed by: PSYCHIATRY & NEUROLOGY

## 2025-08-26 PROCEDURE — 6370000000 HC RX 637 (ALT 250 FOR IP): Performed by: PSYCHIATRY & NEUROLOGY

## 2025-08-26 PROCEDURE — 90833 PSYTX W PT W E/M 30 MIN: CPT | Performed by: PSYCHIATRY & NEUROLOGY

## 2025-08-26 PROCEDURE — 2040000000 HC PSYCH ICU R&B

## 2025-08-26 PROCEDURE — 99232 SBSQ HOSP IP/OBS MODERATE 35: CPT | Performed by: PSYCHIATRY & NEUROLOGY

## 2025-08-26 PROCEDURE — 6370000000 HC RX 637 (ALT 250 FOR IP)

## 2025-08-26 PROCEDURE — APPSS30 APP SPLIT SHARED TIME 16-30 MINUTES: Performed by: NURSE PRACTITIONER

## 2025-08-26 RX ORDER — CLONIDINE HYDROCHLORIDE 0.1 MG/1
0.1 TABLET ORAL NIGHTLY
Status: DISCONTINUED | OUTPATIENT
Start: 2025-08-26 | End: 2025-08-27 | Stop reason: HOSPADM

## 2025-08-26 RX ADMIN — NICOTINE POLACRILEX 2 MG: 2 LOZENGE ORAL at 17:50

## 2025-08-26 RX ADMIN — AMLODIPINE BESYLATE 10 MG: 10 TABLET ORAL at 07:52

## 2025-08-26 RX ADMIN — LOSARTAN POTASSIUM 50 MG: 50 TABLET, FILM COATED ORAL at 07:52

## 2025-08-26 RX ADMIN — RISPERIDONE 250 MG: 250 INJECTION, SUSPENSION, EXTENDED RELEASE SUBCUTANEOUS at 09:00

## 2025-08-26 RX ADMIN — NICOTINE POLACRILEX 2 MG: 2 LOZENGE ORAL at 08:37

## 2025-08-26 RX ADMIN — NICOTINE POLACRILEX 2 MG: 2 LOZENGE ORAL at 15:28

## 2025-08-26 RX ADMIN — RISPERIDONE 3 MG: 1 TABLET, ORALLY DISINTEGRATING ORAL at 07:52

## 2025-08-27 VITALS
OXYGEN SATURATION: 100 % | RESPIRATION RATE: 14 BRPM | WEIGHT: 260 LBS | TEMPERATURE: 97.9 F | DIASTOLIC BLOOD PRESSURE: 78 MMHG | BODY MASS INDEX: 35.21 KG/M2 | HEIGHT: 72 IN | SYSTOLIC BLOOD PRESSURE: 154 MMHG | HEART RATE: 102 BPM

## 2025-08-27 PROCEDURE — 99239 HOSP IP/OBS DSCHRG MGMT >30: CPT | Performed by: PSYCHIATRY & NEUROLOGY

## 2025-08-27 PROCEDURE — 6370000000 HC RX 637 (ALT 250 FOR IP)

## 2025-08-27 RX ORDER — ATORVASTATIN CALCIUM 20 MG/1
20 TABLET, FILM COATED ORAL NIGHTLY
Qty: 30 TABLET | Refills: 0 | Status: SHIPPED | OUTPATIENT
Start: 2025-08-27

## 2025-08-27 RX ORDER — AMLODIPINE BESYLATE 10 MG/1
10 TABLET ORAL DAILY
Qty: 30 TABLET | Refills: 0 | Status: SHIPPED | OUTPATIENT
Start: 2025-08-27

## 2025-08-27 RX ORDER — TRAZODONE HYDROCHLORIDE 50 MG/1
50 TABLET ORAL NIGHTLY
Qty: 30 TABLET | Refills: 0 | Status: SHIPPED | OUTPATIENT
Start: 2025-08-27

## 2025-08-27 RX ORDER — HYDROXYZINE HYDROCHLORIDE 50 MG/1
50 TABLET, FILM COATED ORAL 3 TIMES DAILY PRN
Qty: 30 TABLET | Refills: 0 | Status: SHIPPED | OUTPATIENT
Start: 2025-08-27 | End: 2025-09-06

## 2025-08-27 RX ORDER — LOSARTAN POTASSIUM 50 MG/1
50 TABLET ORAL DAILY
Qty: 30 TABLET | Refills: 0 | Status: SHIPPED | OUTPATIENT
Start: 2025-08-27

## 2025-08-27 RX ADMIN — AMLODIPINE BESYLATE 10 MG: 10 TABLET ORAL at 08:21

## 2025-08-27 RX ADMIN — LOSARTAN POTASSIUM 50 MG: 50 TABLET, FILM COATED ORAL at 08:21

## 2025-08-27 ASSESSMENT — PAIN SCALES - GENERAL
PAINLEVEL_OUTOF10: 0
PAINLEVEL_OUTOF10: 0